# Patient Record
Sex: FEMALE | Race: WHITE | NOT HISPANIC OR LATINO | Employment: PART TIME | URBAN - METROPOLITAN AREA
[De-identification: names, ages, dates, MRNs, and addresses within clinical notes are randomized per-mention and may not be internally consistent; named-entity substitution may affect disease eponyms.]

---

## 2017-02-20 NOTE — TELEPHONE ENCOUNTER
Amy pt requesting refill of phentermine. Saw Dr. Reyes in August for her annual. Allergies and pharmacy are up to date.

## 2017-02-20 NOTE — TELEPHONE ENCOUNTER
[facility] We do not routine prescribe this in our GYN practice

## 2017-04-26 DIAGNOSIS — Z01.419 WELL FEMALE EXAM WITH ROUTINE GYNECOLOGICAL EXAM: ICD-10-CM

## 2017-04-26 RX ORDER — AMITRIPTYLINE HYDROCHLORIDE 25 MG/1
TABLET, FILM COATED ORAL
Qty: 30 TABLET | Refills: 0 | Status: SHIPPED | OUTPATIENT
Start: 2017-04-26 | End: 2017-05-19 | Stop reason: SDUPTHER

## 2017-05-19 DIAGNOSIS — Z01.419 WELL FEMALE EXAM WITH ROUTINE GYNECOLOGICAL EXAM: ICD-10-CM

## 2017-05-22 RX ORDER — AMITRIPTYLINE HYDROCHLORIDE 25 MG/1
TABLET, FILM COATED ORAL
Qty: 30 TABLET | Refills: 0 | Status: SHIPPED | OUTPATIENT
Start: 2017-05-22 | End: 2017-06-19 | Stop reason: SDUPTHER

## 2017-06-19 DIAGNOSIS — Z01.419 WELL FEMALE EXAM WITH ROUTINE GYNECOLOGICAL EXAM: ICD-10-CM

## 2017-06-19 RX ORDER — AMITRIPTYLINE HYDROCHLORIDE 25 MG/1
TABLET, FILM COATED ORAL
Qty: 30 TABLET | Refills: 0 | Status: SHIPPED | OUTPATIENT
Start: 2017-06-19 | End: 2017-07-16 | Stop reason: SDUPTHER

## 2017-07-12 ENCOUNTER — TELEPHONE (OUTPATIENT)
Dept: OBSTETRICS AND GYNECOLOGY | Facility: CLINIC | Age: 62
End: 2017-07-12

## 2017-07-16 DIAGNOSIS — Z01.419 WELL FEMALE EXAM WITH ROUTINE GYNECOLOGICAL EXAM: ICD-10-CM

## 2017-07-17 RX ORDER — AMITRIPTYLINE HYDROCHLORIDE 25 MG/1
TABLET, FILM COATED ORAL
Qty: 30 TABLET | Refills: 0 | Status: SHIPPED | OUTPATIENT
Start: 2017-07-17 | End: 2017-08-14

## 2017-08-03 DIAGNOSIS — Z01.419 WELL FEMALE EXAM WITH ROUTINE GYNECOLOGICAL EXAM: ICD-10-CM

## 2017-08-03 RX ORDER — VENLAFAXINE HYDROCHLORIDE 75 MG/1
CAPSULE, EXTENDED RELEASE ORAL
Qty: 30 CAPSULE | Refills: 2 | Status: SHIPPED | OUTPATIENT
Start: 2017-08-03 | End: 2017-09-29 | Stop reason: SDUPTHER

## 2017-08-04 ENCOUNTER — TELEPHONE (OUTPATIENT)
Dept: OBSTETRICS AND GYNECOLOGY | Facility: CLINIC | Age: 62
End: 2017-08-04

## 2017-08-04 NOTE — TELEPHONE ENCOUNTER
Pt states she hasn't been sleeping for several months.  She has been taking 2 tylenol PM to help but thinks she should increase her dose of sleeping medication.  She is prescribed Elavil 25mg but it takes 3-4 hours before she falls asleep.  She is requesting something to put her to sleep sooner and help her stay asleep.      Allergies and pharmacy UTD

## 2017-08-04 NOTE — TELEPHONE ENCOUNTER
Dr. Reyes-- pt states that her sleep aid Rx is not working for her. She states that she is up for 3 to 4 hours after taking the medication. Pt would like to know if she can have a stronger dose or if her Rx can be changed to something else. Pt's # [phone]

## 2017-08-14 RX ORDER — AMITRIPTYLINE HYDROCHLORIDE 50 MG/1
50 TABLET, FILM COATED ORAL NIGHTLY
Qty: 30 TABLET | Refills: 11 | Status: SHIPPED | OUTPATIENT
Start: 2017-08-14 | End: 2018-02-19 | Stop reason: SDUPTHER

## 2017-08-16 RX ORDER — AMITRIPTYLINE HYDROCHLORIDE 50 MG/1
50 TABLET, FILM COATED ORAL NIGHTLY
Qty: 30 TABLET | Refills: 11 | OUTPATIENT
Start: 2017-08-16 | End: 2018-08-16

## 2017-09-29 ENCOUNTER — OFFICE VISIT (OUTPATIENT)
Dept: OBSTETRICS AND GYNECOLOGY | Facility: CLINIC | Age: 62
End: 2017-09-29
Payer: COMMERCIAL

## 2017-09-29 ENCOUNTER — TELEPHONE (OUTPATIENT)
Dept: OBSTETRICS AND GYNECOLOGY | Facility: CLINIC | Age: 62
End: 2017-09-29

## 2017-09-29 VITALS
SYSTOLIC BLOOD PRESSURE: 128 MMHG | WEIGHT: 148.81 LBS | BODY MASS INDEX: 23.92 KG/M2 | DIASTOLIC BLOOD PRESSURE: 78 MMHG | HEIGHT: 66 IN

## 2017-09-29 DIAGNOSIS — Z12.31 ENCOUNTER FOR SCREENING MAMMOGRAM FOR MALIGNANT NEOPLASM OF BREAST: Primary | ICD-10-CM

## 2017-09-29 DIAGNOSIS — Z01.419 ENCOUNTER FOR GYNECOLOGICAL EXAMINATION (GENERAL) (ROUTINE) WITHOUT ABNORMAL FINDINGS: Primary | ICD-10-CM

## 2017-09-29 PROCEDURE — 99999 PR PBB SHADOW E&M-EST. PATIENT-LVL II: CPT | Mod: PBBFAC,,, | Performed by: OBSTETRICS & GYNECOLOGY

## 2017-09-29 PROCEDURE — 99396 PREV VISIT EST AGE 40-64: CPT | Mod: S$GLB,,, | Performed by: OBSTETRICS & GYNECOLOGY

## 2017-09-29 NOTE — TELEPHONE ENCOUNTER
Let patient know we faxed mmg orders to [facility]. Also, patient has a medication issue that needs to be resolved. Will speak with Dr Reyes on Monday and follow up with patient.

## 2017-09-29 NOTE — PROGRESS NOTES
Subjective:       Patient ID: Nguyen Ivan is a 62 y.o. female.    Chief Complaint:  Annual Exam (last pap/hpv normal/neg 16, colonoscopy .)      There is no problem list on file for this patient.      History of Present Illness  62 y.o. yo  here for annual exam. No gyn complaints. Doing well. Lost 35 pounds with [facility]. Family members lost weight too. Wants to get back on Phentermine. rec do Maintenance though [facility] and they can give Rx for 3 months    Patient had a normal pap smear and HPV in 2016 at last annual visit. I explained new guidelines. Will repeat pap and HPV every 3 years. Answered all questions. Patient agrees.         Past Medical History:   Diagnosis Date    Depression     Family history of breast cancer in sister     age 45,lumpectomy,XRT    Mitral valve prolapse        Past Surgical History:   Procedure Laterality Date    BREAST LUMPECTOMY      atypical ductal hyperplasia     BREAST SURGERY      reduction    CHOLECYSTECTOMY      LIVER LOBECTOMY         OB History    Para Term  AB Living   4 3     1 3   SAB TAB Ectopic Multiple Live Births   1       3      # Outcome Date GA Lbr Bright/2nd Weight Sex Delivery Anes PTL Lv   4 SAB            3 Para      Vag-Spont   REBEKA   2 Para      Vag-Spont   REBEKA   1 Para      Vag-Spont   REBEKA          No LMP recorded. Patient is postmenopausal.   Date of Last Pap: 2016    Review of Systems  Review of Systems   Constitutional: Negative for fatigue and unexpected weight change.   Respiratory: Negative for shortness of breath.    Cardiovascular: Negative for chest pain.   Gastrointestinal: Negative for abdominal pain, constipation, diarrhea, nausea and vomiting.   Genitourinary: Negative for dysuria.   Musculoskeletal: Negative for back pain.   Skin: Negative for rash.   Neurological: Negative for headaches.   Hematological: Does not bruise/bleed easily.   Psychiatric/Behavioral: Negative for behavioral problems.         Objective:   Physical Exam:   Constitutional: She is oriented to person, place, and time. Vital signs are normal. She appears well-developed and well-nourished. No distress.        Pulmonary/Chest: She exhibits no mass. Right breast exhibits no mass, no nipple discharge, no skin change, no tenderness, no bleeding and no swelling. Left breast exhibits no mass, no nipple discharge, no skin change, no tenderness, no bleeding and no swelling. Breasts are symmetrical.        Abdominal: Soft. Normal appearance and bowel sounds are normal. She exhibits no distension and no mass. There is no tenderness. There is no rebound.     Genitourinary: Vagina normal and uterus normal. There is no rash, tenderness, lesion or injury on the right labia. There is no rash, tenderness, lesion or injury on the left labia. Uterus is not deviated, not enlarged, not fixed, not tender, not hosting fibroids and not experiencing uterine prolapse. Cervix is normal. Right adnexum displays no mass, no tenderness and no fullness. Left adnexum displays no mass, no tenderness and no fullness. No erythema, tenderness, rectocele, cystocele or unspecified prolapse of vaginal walls in the vagina. No vaginal discharge found. Cervix exhibits no motion tenderness, no discharge and no friability.           Musculoskeletal: Normal range of motion and moves all extremeties.      Lymphadenopathy:     She has no axillary adenopathy.        Right: No supraclavicular adenopathy present.        Left: No supraclavicular adenopathy present.    Neurological: She is alert and oriented to person, place, and time.    Skin: Skin is warm and dry.    Psychiatric: She has a normal mood and affect. Her behavior is normal. Judgment normal.        Assessment/ Plan:     1. Encounter for gynecological examination (general) (routine) without abnormal findings         Follow-up with me in 1 year

## 2017-10-02 DIAGNOSIS — Z01.419 WELL FEMALE EXAM WITH ROUTINE GYNECOLOGICAL EXAM: ICD-10-CM

## 2017-10-02 RX ORDER — VENLAFAXINE HYDROCHLORIDE 75 MG/1
CAPSULE, EXTENDED RELEASE ORAL
Qty: 90 CAPSULE | Refills: 3 | Status: SHIPPED | OUTPATIENT
Start: 2017-10-02 | End: 2018-03-28 | Stop reason: SDUPTHER

## 2017-10-03 NOTE — TELEPHONE ENCOUNTER
----- Message from Lorie Campos MA sent at 10/2/2017  5:36 PM CDT -----  MD Lorie Slaughter MA         She can do both. Effexor is antidepressant and Elavil can help with sleep at night. Ok to take both if she needs it   Previous Messages        ----- Message -----   From: Lorie Campos MA   Sent: 10/2/2017  11:23 AM   To: Lydia Reyes MD     Patient is confused about whether she is supposed to be taking Effexor or Elavil... She said y'all had talked about switching and she has orders for both but not sure which one to fill. Please advise.     ~ Lorie

## 2017-10-03 NOTE — TELEPHONE ENCOUNTER
Left message for patient confirming that Dr. Reyes said that she can take both meds if she feels like she needs them. Told her to call back with any questions or concerns.

## 2018-01-02 NOTE — TELEPHONE ENCOUNTER
Advised that Dr. Reyes only prescribed Phentermine through [facility] wt loss.  Verbalized understanding.    Abdomen soft, non-tender, no guarding. Obese.

## 2018-02-19 ENCOUNTER — TELEPHONE (OUTPATIENT)
Dept: OBSTETRICS AND GYNECOLOGY | Facility: CLINIC | Age: 63
End: 2018-02-19

## 2018-02-19 RX ORDER — AMITRIPTYLINE HYDROCHLORIDE 50 MG/1
50 TABLET, FILM COATED ORAL NIGHTLY
Qty: 30 TABLET | Refills: 3 | Status: SHIPPED | OUTPATIENT
Start: 2018-02-19 | End: 2019-02-25

## 2018-02-19 NOTE — TELEPHONE ENCOUNTER
Patient notified that RX has been sent to pharm. Also notified per Dr Reyes that if she continues to have sleep disturbances it is best to consult a PCP for evaluation for a possible sleep study. Patient verbalized understanding.

## 2018-02-19 NOTE — TELEPHONE ENCOUNTER
I sent in refill. If not doing enough may need to discuss with PCP her insomnia. They may recommend sleep study or something else. As GYN there are limitation to what meds we give

## 2018-02-19 NOTE — TELEPHONE ENCOUNTER
Dr. Reyes-- pt wanted to notify Dr. Reyes that she is currently taking 50 mg of amitriptyline and states that sometimes it takes 3 hrs to start working. Pt states that she may need a new Rx. Pt's # [phone]

## 2018-03-28 DIAGNOSIS — Z01.419 WELL FEMALE EXAM WITH ROUTINE GYNECOLOGICAL EXAM: ICD-10-CM

## 2018-03-28 RX ORDER — VENLAFAXINE HYDROCHLORIDE 75 MG/1
CAPSULE, EXTENDED RELEASE ORAL
Qty: 90 CAPSULE | Refills: 0 | Status: SHIPPED | OUTPATIENT
Start: 2018-03-28 | End: 2018-05-08 | Stop reason: SDUPTHER

## 2018-05-08 DIAGNOSIS — Z01.419 WELL FEMALE EXAM WITH ROUTINE GYNECOLOGICAL EXAM: ICD-10-CM

## 2018-05-09 RX ORDER — VENLAFAXINE HYDROCHLORIDE 75 MG/1
CAPSULE, EXTENDED RELEASE ORAL
Qty: 90 CAPSULE | Refills: 0 | Status: SHIPPED | OUTPATIENT
Start: 2018-05-09 | End: 2018-07-09 | Stop reason: SDUPTHER

## 2018-07-03 DIAGNOSIS — Z01.419 WELL FEMALE EXAM WITH ROUTINE GYNECOLOGICAL EXAM: ICD-10-CM

## 2018-07-03 RX ORDER — VENLAFAXINE HYDROCHLORIDE 75 MG/1
CAPSULE, EXTENDED RELEASE ORAL
Qty: 90 CAPSULE | Refills: 0 | Status: CANCELLED | OUTPATIENT
Start: 2018-07-03

## 2018-07-09 DIAGNOSIS — Z01.419 WELL FEMALE EXAM WITH ROUTINE GYNECOLOGICAL EXAM: ICD-10-CM

## 2018-07-10 RX ORDER — VENLAFAXINE HYDROCHLORIDE 75 MG/1
CAPSULE, EXTENDED RELEASE ORAL
Qty: 180 CAPSULE | Refills: 0 | Status: SHIPPED | OUTPATIENT
Start: 2018-07-10 | End: 2018-10-10 | Stop reason: SDUPTHER

## 2018-10-10 DIAGNOSIS — Z01.419 WELL FEMALE EXAM WITH ROUTINE GYNECOLOGICAL EXAM: ICD-10-CM

## 2018-10-10 RX ORDER — VENLAFAXINE HYDROCHLORIDE 75 MG/1
CAPSULE, EXTENDED RELEASE ORAL
Qty: 180 CAPSULE | Refills: 0 | Status: SHIPPED | OUTPATIENT
Start: 2018-10-10 | End: 2018-12-27 | Stop reason: SDUPTHER

## 2018-12-27 DIAGNOSIS — Z01.419 WELL FEMALE EXAM WITH ROUTINE GYNECOLOGICAL EXAM: ICD-10-CM

## 2018-12-28 RX ORDER — VENLAFAXINE HYDROCHLORIDE 75 MG/1
CAPSULE, EXTENDED RELEASE ORAL
Qty: 60 CAPSULE | Refills: 0 | Status: SHIPPED | OUTPATIENT
Start: 2018-12-28 | End: 2019-01-25 | Stop reason: SDUPTHER

## 2018-12-28 NOTE — TELEPHONE ENCOUNTER
Left message to inform pt that one month of refills were sent in but that she needs to schedule annual exam for additional refills

## 2019-01-25 DIAGNOSIS — Z01.419 WELL FEMALE EXAM WITH ROUTINE GYNECOLOGICAL EXAM: ICD-10-CM

## 2019-01-25 RX ORDER — VENLAFAXINE HYDROCHLORIDE 75 MG/1
150 CAPSULE, EXTENDED RELEASE ORAL DAILY
Qty: 60 CAPSULE | Refills: 0 | Status: SHIPPED | OUTPATIENT
Start: 2019-01-25 | End: 2019-01-25 | Stop reason: SDUPTHER

## 2019-01-25 RX ORDER — VENLAFAXINE HYDROCHLORIDE 75 MG/1
CAPSULE, EXTENDED RELEASE ORAL
Qty: 180 CAPSULE | Refills: 0 | Status: SHIPPED | OUTPATIENT
Start: 2019-01-25 | End: 2019-04-15 | Stop reason: SDUPTHER

## 2019-02-25 ENCOUNTER — OFFICE VISIT (OUTPATIENT)
Dept: OBSTETRICS AND GYNECOLOGY | Facility: CLINIC | Age: 64
End: 2019-02-25
Payer: MEDICARE

## 2019-02-25 VITALS
HEIGHT: 66 IN | SYSTOLIC BLOOD PRESSURE: 124 MMHG | DIASTOLIC BLOOD PRESSURE: 78 MMHG | BODY MASS INDEX: 25.81 KG/M2 | WEIGHT: 160.63 LBS

## 2019-02-25 DIAGNOSIS — Z01.419 ENCOUNTER FOR GYNECOLOGICAL EXAMINATION (GENERAL) (ROUTINE) WITHOUT ABNORMAL FINDINGS: Primary | ICD-10-CM

## 2019-02-25 PROCEDURE — G0101 CA SCREEN;PELVIC/BREAST EXAM: HCPCS | Mod: S$PBB,,, | Performed by: OBSTETRICS & GYNECOLOGY

## 2019-02-25 PROCEDURE — G0101 CA SCREEN;PELVIC/BREAST EXAM: HCPCS | Mod: PBBFAC,PN | Performed by: OBSTETRICS & GYNECOLOGY

## 2019-02-25 PROCEDURE — 99213 OFFICE O/P EST LOW 20 MIN: CPT | Mod: PBBFAC,PN,25 | Performed by: OBSTETRICS & GYNECOLOGY

## 2019-02-25 PROCEDURE — 99999 PR PBB SHADOW E&M-EST. PATIENT-LVL III: ICD-10-PCS | Mod: PBBFAC,,, | Performed by: OBSTETRICS & GYNECOLOGY

## 2019-02-25 PROCEDURE — 99999 PR PBB SHADOW E&M-EST. PATIENT-LVL III: CPT | Mod: PBBFAC,,, | Performed by: OBSTETRICS & GYNECOLOGY

## 2019-02-25 PROCEDURE — G0101 PR CA SCREEN;PELVIC/BREAST EXAM: ICD-10-PCS | Mod: S$PBB,,, | Performed by: OBSTETRICS & GYNECOLOGY

## 2019-02-25 RX ORDER — LETROZOLE 2.5 MG/1
TABLET, FILM COATED ORAL
Refills: 0 | COMMUNITY
Start: 2019-01-25 | End: 2022-07-27

## 2019-02-25 NOTE — PROGRESS NOTES
Subjective:       Patient ID: Nguyen Ivan is a 63 y.o. female.    Chief Complaint:  Well Woman (Annual Exam  --  last pap/hpv 2016, Negative  --  last mmg , Birads 4 (right breast)  --  colonoscopy )      There is no problem list on file for this patient.      History of Present Illness  63 y.o. yo  here for annual exam. No gyn complaints. Doing well. Had double mastectomy for breast cancer. Reconstruction with Dr. Callejas. Doing well. No longer needs MMG. On Femara.     Patient had a normal pap smear and HPV in 2016 at last annual visit. I explained new guidelines. Will repeat pap and HPV every 3 years. Answered all questions. Patient agrees.     Past Medical History:   Diagnosis Date    Breast cancer 2017    Birads 4  Bilateral Mastectomy     Depression     Family history of breast cancer in sister     age 45,lumpectomy,XRT    H/O abnormal mammogram 10/30/2017    Birads 4    Mitral valve prolapse        Past Surgical History:   Procedure Laterality Date    BILATERAL MASTECTOMY Bilateral 2017    BREAST LUMPECTOMY      atypical ductal hyperplasia     BREAST RECONSTRUCTION Bilateral 3/2018 & 2018    CHOLECYSTECTOMY  2005    COLONOSCOPY      DILATION AND CURETTAGE OF UTERUS      Missed Ab    LIVER LOBECTOMY  2005    REDUCTION OF BOTH BREASTS  2009    TONSILLECTOMY  1970    TUBAL LIGATION      with last delivery     UMBILICAL HERNIA REPAIR  2006       OB History    Para Term  AB Living   4 3 3   1 3   SAB TAB Ectopic Multiple Live Births   1       3      # Outcome Date GA Lbr Bright/2nd Weight Sex Delivery Anes PTL Lv   4 Term  40w0d   F Vag-Spont EPI  REBEKA      Birth Comments: BTL Done    3 1983 8w0d          2 Term  40w0d   M Vag-Spont EPI  REBEKA   1 Term  40w0d   F Vag-Spont EPI  REBEKA          No LMP recorded (lmp unknown). Patient is postmenopausal.   Date of Last Pap: 2016    Review of Systems  Review of Systems    Constitutional: Negative for fatigue and unexpected weight change.   Respiratory: Negative for shortness of breath.    Cardiovascular: Negative for chest pain.   Gastrointestinal: Negative for abdominal pain, constipation, diarrhea, nausea and vomiting.   Genitourinary: Negative for dysuria.   Musculoskeletal: Negative for back pain.   Skin: Negative for rash.   Neurological: Negative for headaches.   Hematological: Does not bruise/bleed easily.   Psychiatric/Behavioral: Negative for behavioral problems.        Objective:   Physical Exam:   Constitutional: She is oriented to person, place, and time. Vital signs are normal. She appears well-developed and well-nourished. No distress.        Pulmonary/Chest: She exhibits no mass. Right breast exhibits no mass, no nipple discharge, no skin change, no tenderness, no bleeding and no swelling. Left breast exhibits no mass, no nipple discharge, no skin change, no tenderness, no bleeding and no swelling. Breasts are symmetrical.   Reconstruction surgery        Abdominal: Soft. Normal appearance and bowel sounds are normal. She exhibits no distension and no mass. There is no tenderness. There is no rebound.     Genitourinary: Vagina normal and uterus normal. There is no rash, tenderness, lesion or injury on the right labia. There is no rash, tenderness, lesion or injury on the left labia. Uterus is not deviated, not enlarged, not fixed, not tender, not hosting fibroids and not experiencing uterine prolapse. Cervix is normal. Right adnexum displays no mass, no tenderness and no fullness. Left adnexum displays no mass, no tenderness and no fullness. No erythema, tenderness, rectocele, cystocele or unspecified prolapse of vaginal walls in the vagina. No vaginal discharge found. Cervix exhibits no motion tenderness, no discharge and no friability.           Musculoskeletal: Normal range of motion and moves all extremeties.      Lymphadenopathy:     She has no axillary  adenopathy.        Right: No supraclavicular adenopathy present.        Left: No supraclavicular adenopathy present.    Neurological: She is alert and oriented to person, place, and time.    Skin: Skin is warm and dry.    Psychiatric: She has a normal mood and affect. Her behavior is normal. Judgment normal.        Assessment/ Plan:     1. Encounter for gynecological examination (general) (routine) without abnormal findings         Follow-up with me in 1 year

## 2019-04-15 DIAGNOSIS — Z01.419 WELL FEMALE EXAM WITH ROUTINE GYNECOLOGICAL EXAM: ICD-10-CM

## 2019-04-15 RX ORDER — VENLAFAXINE HYDROCHLORIDE 75 MG/1
CAPSULE, EXTENDED RELEASE ORAL
Qty: 180 CAPSULE | Refills: 0 | Status: SHIPPED | OUTPATIENT
Start: 2019-04-15 | End: 2020-11-12 | Stop reason: SDUPTHER

## 2019-04-30 DIAGNOSIS — Z01.419 WELL FEMALE EXAM WITH ROUTINE GYNECOLOGICAL EXAM: ICD-10-CM

## 2019-04-30 RX ORDER — VENLAFAXINE HYDROCHLORIDE 75 MG/1
150 CAPSULE, EXTENDED RELEASE ORAL DAILY
Qty: 180 CAPSULE | Refills: 0 | OUTPATIENT
Start: 2019-04-30

## 2019-05-09 ENCOUNTER — CLINICAL SUPPORT (OUTPATIENT)
Dept: INTERNAL MEDICINE | Facility: CLINIC | Age: 64
End: 2019-05-09
Payer: MEDICARE

## 2019-05-09 ENCOUNTER — IMMUNIZATION (OUTPATIENT)
Dept: PHARMACY | Facility: CLINIC | Age: 64
End: 2019-05-09
Payer: MEDICARE

## 2019-05-09 ENCOUNTER — OFFICE VISIT (OUTPATIENT)
Dept: INTERNAL MEDICINE | Facility: CLINIC | Age: 64
End: 2019-05-09
Payer: MEDICARE

## 2019-05-09 VITALS
HEART RATE: 78 BPM | OXYGEN SATURATION: 98 % | HEIGHT: 66 IN | BODY MASS INDEX: 25.79 KG/M2 | DIASTOLIC BLOOD PRESSURE: 76 MMHG | WEIGHT: 160.5 LBS | SYSTOLIC BLOOD PRESSURE: 112 MMHG

## 2019-05-09 DIAGNOSIS — Z13.220 LIPID SCREENING: ICD-10-CM

## 2019-05-09 DIAGNOSIS — Z90.13 HISTORY OF BILATERAL MASTECTOMY: ICD-10-CM

## 2019-05-09 DIAGNOSIS — L98.9 HAND LESION: ICD-10-CM

## 2019-05-09 DIAGNOSIS — Z13.0 SCREENING, ANEMIA, DEFICIENCY, IRON: ICD-10-CM

## 2019-05-09 DIAGNOSIS — F32.9 MAJOR DEPRESSION, CHRONIC: ICD-10-CM

## 2019-05-09 DIAGNOSIS — Z11.59 NEED FOR HEPATITIS C SCREENING TEST: ICD-10-CM

## 2019-05-09 DIAGNOSIS — Z79.899 OTHER LONG TERM (CURRENT) DRUG THERAPY: ICD-10-CM

## 2019-05-09 DIAGNOSIS — Z76.89 ESTABLISHING CARE WITH NEW DOCTOR, ENCOUNTER FOR: Primary | ICD-10-CM

## 2019-05-09 DIAGNOSIS — R68.89 FORGETFULNESS: ICD-10-CM

## 2019-05-09 DIAGNOSIS — Z78.0 POST-MENOPAUSAL: ICD-10-CM

## 2019-05-09 DIAGNOSIS — Z85.3 HISTORY OF BREAST CANCER: ICD-10-CM

## 2019-05-09 DIAGNOSIS — Z13.6 ENCOUNTER FOR SCREENING FOR CARDIOVASCULAR DISORDERS: ICD-10-CM

## 2019-05-09 DIAGNOSIS — Z13.1 SCREENING FOR DIABETES MELLITUS: ICD-10-CM

## 2019-05-09 PROCEDURE — 99203 OFFICE O/P NEW LOW 30 MIN: CPT | Mod: S$PBB,,, | Performed by: INTERNAL MEDICINE

## 2019-05-09 PROCEDURE — 99999 PR PBB SHADOW E&M-EST. PATIENT-LVL III: ICD-10-PCS | Mod: PBBFAC,,, | Performed by: INTERNAL MEDICINE

## 2019-05-09 PROCEDURE — 99213 OFFICE O/P EST LOW 20 MIN: CPT | Mod: PBBFAC,27 | Performed by: INTERNAL MEDICINE

## 2019-05-09 PROCEDURE — 99203 PR OFFICE/OUTPT VISIT, NEW, LEVL III, 30-44 MIN: ICD-10-PCS | Mod: S$PBB,,, | Performed by: INTERNAL MEDICINE

## 2019-05-09 PROCEDURE — 99999 PR PBB SHADOW E&M-EST. PATIENT-LVL I: ICD-10-PCS | Mod: PBBFAC,,,

## 2019-05-09 PROCEDURE — 99999 PR PBB SHADOW E&M-EST. PATIENT-LVL III: CPT | Mod: PBBFAC,,, | Performed by: INTERNAL MEDICINE

## 2019-05-09 PROCEDURE — 99211 OFF/OP EST MAY X REQ PHY/QHP: CPT | Mod: PBBFAC,25

## 2019-05-09 PROCEDURE — G0009 ADMIN PNEUMOCOCCAL VACCINE: HCPCS | Mod: PBBFAC

## 2019-05-09 PROCEDURE — 99999 PR PBB SHADOW E&M-EST. PATIENT-LVL I: CPT | Mod: PBBFAC,,,

## 2019-05-09 NOTE — PROGRESS NOTES
"INTERNAL MEDICINE INITIAL VISIT NOTE      CHIEF COMPLAINT     Chief Complaint   Patient presents with    Establish Care       HPI     Nguyen Ivan is a 64 y.o.  female who presents with Breast CA s/p B mastectomy 11/2017 and s/p breast reconstruction, currently on Letrozole, fam hx breast CA, MVP (seen by Cards in the past at  and told it was no longer an issue, last echo many years ago, depression, hx liver lobectomy 2/2 "a growth on her liver" (path benign per pt and reports f/u imaging had been normal), baseline hearing impaired, chronic neck and back pain c hx herniated discs followed by Dr. Fairchild, former pt of Dr. Allison at , here today to establish care.    Sees Dr. Lydia Reyes for gyn and now wants to have all care within [facility].    Regarding neck and back issues, has been on Gabapentin as rx'ed by pain management, Dr. Fairchild but more recently feels like she has gotten more forgetful at times (example, showed up for a doctor's appt on the wrong day) so has been trying to cut back on her dose, currently takes one in AM and 2qhs.    Regarding depression, has been on Venlafaxine which she feels has really helped.    Today also c c/o lump on the palm of her hand present for quite some time, tender at times.  Has appt pending c Hand Surg/Dr. Galindo for evaluation.    Past Medical History:  Past Medical History:   Diagnosis Date    Breast cancer 12/2017    Birads 4  Bilateral Mastectomy     Depression     Family history of breast cancer in sister     age 45,lumpectomy,XRT    H/O abnormal mammogram 10/30/2017    Birads 4    Mitral valve prolapse        Past Surgical History:  Past Surgical History:   Procedure Laterality Date    BILATERAL MASTECTOMY Bilateral 12/2017    BREAST LUMPECTOMY  2017    atypical ductal hyperplasia     BREAST RECONSTRUCTION Bilateral 3/2018 & 5/2018    CHOLECYSTECTOMY  2005    COLONOSCOPY  2011    DILATION AND CURETTAGE OF UTERUS  1983    Missed Ab "    LIVER LOBECTOMY  2005    REDUCTION OF BOTH BREASTS  2009    TONSILLECTOMY  08/1970    TUBAL LIGATION  1985    with last delivery     UMBILICAL HERNIA REPAIR  2006       Home Medications:  Prior to Admission medications    Medication Sig Start Date End Date Taking? Authorizing Provider   gabapentin (NEURONTIN) 400 MG capsule TK ONE C PO  IN THE MORNING AND 3 CS PO HS 8/15/16   Historical Provider, MD   letrozole (FEMARA) 2.5 mg Tab TK 1 T PO D 1/25/19   Historical Provider, MD   MELATON-GENISTEIN-HERB NO.233 ORAL Take 1 tablet by mouth nightly as needed.    Historical Provider, MD   venlafaxine (EFFEXOR-XR) 75 MG 24 hr capsule TAKE 2 CAPSULES BY MOUTH EVERY DAY 4/15/19   Donna Hernandez MD   diphenhydrAMINE HCl (UNISOM, DIPHENHYDRAMINE,) 50 mg/30 mL Liqd Take by mouth nightly as needed.  5/9/19  Historical Provider, MD   diphenhydramine-acetaminophen (TYLENOL PM EXTRA STRENGTH)  mg Tab Take 1 tablet by mouth nightly as needed.  5/9/19  Historical Provider, MD       Allergies:  Review of patient's allergies indicates:  No Known Allergies    Family History:  Family History   Problem Relation Age of Onset    Breast cancer Sister 45        XRT    Heart attack Father     Dementia Mother     Alzheimer's disease Mother     Breast cancer Paternal Aunt     Breast cancer Maternal Aunt     Colon cancer Neg Hx     Ovarian cancer Neg Hx     Uterine cancer Neg Hx     Cervical cancer Neg Hx     Prostate cancer Neg Hx        Social History:  Social History     Tobacco Use    Smoking status: Never Smoker    Smokeless tobacco: Never Used   Substance Use Topics    Alcohol use: Yes     Comment: Social     Drug use: No       Review of Systems:  Review of Systems   Constitutional: Negative for chills, fatigue and fever.   Respiratory: Negative for cough, chest tightness and shortness of breath.    Cardiovascular: Negative for chest pain.   Gastrointestinal: Negative for abdominal pain and blood in  "stool.   Genitourinary: Negative for dysuria and frequency.       Health Maintenance:   Immunizations:   Influenza does not take, rec this Fall  TDap today  Prevnar 13 today  Shingrix rec once back in stock    Cancer Screening:  PAP: 8/2016 neg c neg HPV  Mammogram:  N/a, hx B mastectomy  Colonoscopy:  Done around 8-10 years ago at , records requested to determine f/u date.  Was told normal.  DEXA:  Recommended, last done about 10 years ago per hx, will order.      PHYSICAL EXAM     /76 (BP Location: Left arm, Patient Position: Sitting, BP Method: Medium (Manual))   Pulse 78   Ht 5' 6" (1.676 m)   Wt 72.8 kg (160 lb 7.9 oz)   LMP  (LMP Unknown) Comment:   2003  SpO2 98%   BMI 25.90 kg/m²     GEN - A+OX4, NAD   HEENT - PERRL, EOMI, OP clear  Neck - No thyromegaly or cervical LAD. No thyroid masses felt.  CV - RRR, no m/r/g  Chest - CTAB, no wheezing, crackles, or rhonchi  Abd - S/NT/ND/+BS.   Ext - 2+BDP. No C/C/E. Bony growth palpated on palm of R hand, just medial to 3rd finger, some firmness of tendon just adjacent.  LN - No LAD appreciated.  Skin - Normal color and texture, no rash, no skin lesions.      LABS         ASSESSMENT/PLAN     Nguyen Ivan is a 64 y.o. female with  Nguyen was seen today for establish care.    Diagnoses and all orders for this visit:    Establishing care with new doctor, encounter for  History and physical exam completed.  Health maintenance reviewed as above.    History of breast cancer  History of bilateral mastectomy  As per HPI  Followed by Dr. Betty Meyer at   On Letrozole, year 1 of 5 per pt.  No longer req mmg.    Major depression, chronic  Stable on Effexor, ok to cont , no refills needed at this time.  -     Vitamin B12; Future; Expected date: 05/09/2019    Screening, anemia, deficiency, iron  -     CBC auto differential; Future; Expected date: 05/09/2019    Forgetfulness  Likely benign, sx very mild.  Can try to taper down on gabapentin to see if this " helps.  Will check TSH and b12.  Consider neuro eval in future if sx progress, fam hx early Alzheimers.  -     TSH; Future; Expected date: 05/09/2019  -     Vitamin B12; Future; Expected date: 05/09/2019    Post-menopausal  -     DXA Bone Density Spine And Hip; Future; Expected date: 05/09/2019    Screening for diabetes mellitus  -     Comprehensive metabolic panel; Future; Expected date: 05/09/2019    Lipid screening  -     Lipid panel; Future; Expected date: 05/09/2019    Encounter for screening for cardiovascular disorders   -     Lipid panel; Future; Expected date: 05/09/2019    Hand lesion  Has appt pending c Dr. Galindo which she was advised to keep.    HM as above    RTC in 12 months, sooner if needed and depending on labs.    Teresa Biswas MD  Department of Internal Medicine - [facility]  05/09/2019

## 2019-05-14 ENCOUNTER — LAB VISIT (OUTPATIENT)
Dept: LAB | Facility: HOSPITAL | Age: 64
End: 2019-05-14
Attending: INTERNAL MEDICINE
Payer: MEDICARE

## 2019-05-14 DIAGNOSIS — Z13.0 SCREENING, ANEMIA, DEFICIENCY, IRON: ICD-10-CM

## 2019-05-14 DIAGNOSIS — Z13.6 ENCOUNTER FOR SCREENING FOR CARDIOVASCULAR DISORDERS: ICD-10-CM

## 2019-05-14 DIAGNOSIS — F32.9 MAJOR DEPRESSION, CHRONIC: ICD-10-CM

## 2019-05-14 DIAGNOSIS — R68.89 FORGETFULNESS: ICD-10-CM

## 2019-05-14 DIAGNOSIS — Z13.1 SCREENING FOR DIABETES MELLITUS: ICD-10-CM

## 2019-05-14 DIAGNOSIS — Z13.220 LIPID SCREENING: ICD-10-CM

## 2019-05-14 DIAGNOSIS — Z79.899 OTHER LONG TERM (CURRENT) DRUG THERAPY: ICD-10-CM

## 2019-05-14 LAB
ALBUMIN SERPL BCP-MCNC: 4 G/DL (ref 3.5–5.2)
ALP SERPL-CCNC: 63 U/L (ref 55–135)
ALT SERPL W/O P-5'-P-CCNC: 27 U/L (ref 10–44)
ANION GAP SERPL CALC-SCNC: 9 MMOL/L (ref 8–16)
AST SERPL-CCNC: 19 U/L (ref 10–40)
BASOPHILS # BLD AUTO: 0.06 K/UL (ref 0–0.2)
BASOPHILS NFR BLD: 1.1 % (ref 0–1.9)
BILIRUB SERPL-MCNC: 0.6 MG/DL (ref 0.1–1)
BUN SERPL-MCNC: 16 MG/DL (ref 8–23)
CALCIUM SERPL-MCNC: 9.7 MG/DL (ref 8.7–10.5)
CHLORIDE SERPL-SCNC: 106 MMOL/L (ref 95–110)
CHOLEST SERPL-MCNC: 200 MG/DL (ref 120–199)
CHOLEST/HDLC SERPL: 3.6 {RATIO} (ref 2–5)
CO2 SERPL-SCNC: 27 MMOL/L (ref 23–29)
CREAT SERPL-MCNC: 0.7 MG/DL (ref 0.5–1.4)
DIFFERENTIAL METHOD: ABNORMAL
EOSINOPHIL # BLD AUTO: 0.2 K/UL (ref 0–0.5)
EOSINOPHIL NFR BLD: 3.5 % (ref 0–8)
ERYTHROCYTE [DISTWIDTH] IN BLOOD BY AUTOMATED COUNT: 13.5 % (ref 11.5–14.5)
EST. GFR  (AFRICAN AMERICAN): >60 ML/MIN/1.73 M^2
EST. GFR  (NON AFRICAN AMERICAN): >60 ML/MIN/1.73 M^2
GLUCOSE SERPL-MCNC: 104 MG/DL (ref 70–110)
HCT VFR BLD AUTO: 43.3 % (ref 37–48.5)
HDLC SERPL-MCNC: 55 MG/DL (ref 40–75)
HDLC SERPL: 27.5 % (ref 20–50)
HGB BLD-MCNC: 13.7 G/DL (ref 12–16)
IMM GRANULOCYTES # BLD AUTO: 0.01 K/UL (ref 0–0.04)
IMM GRANULOCYTES NFR BLD AUTO: 0.2 % (ref 0–0.5)
LDLC SERPL CALC-MCNC: 120 MG/DL (ref 63–159)
LYMPHOCYTES # BLD AUTO: 2.8 K/UL (ref 1–4.8)
LYMPHOCYTES NFR BLD: 51.4 % (ref 18–48)
MCH RBC QN AUTO: 28.8 PG (ref 27–31)
MCHC RBC AUTO-ENTMCNC: 31.6 G/DL (ref 32–36)
MCV RBC AUTO: 91 FL (ref 82–98)
MONOCYTES # BLD AUTO: 0.5 K/UL (ref 0.3–1)
MONOCYTES NFR BLD: 8.3 % (ref 4–15)
NEUTROPHILS # BLD AUTO: 1.9 K/UL (ref 1.8–7.7)
NEUTROPHILS NFR BLD: 35.5 % (ref 38–73)
NONHDLC SERPL-MCNC: 145 MG/DL
NRBC BLD-RTO: 0 /100 WBC
PLATELET # BLD AUTO: 325 K/UL (ref 150–350)
PMV BLD AUTO: 9.5 FL (ref 9.2–12.9)
POTASSIUM SERPL-SCNC: 4.4 MMOL/L (ref 3.5–5.1)
PROT SERPL-MCNC: 7.1 G/DL (ref 6–8.4)
RBC # BLD AUTO: 4.76 M/UL (ref 4–5.4)
SODIUM SERPL-SCNC: 142 MMOL/L (ref 136–145)
TRIGL SERPL-MCNC: 125 MG/DL (ref 30–150)
TSH SERPL DL<=0.005 MIU/L-ACNC: 0.87 UIU/ML (ref 0.4–4)
VIT B12 SERPL-MCNC: 574 PG/ML (ref 210–950)
WBC # BLD AUTO: 5.39 K/UL (ref 3.9–12.7)

## 2019-05-14 PROCEDURE — 82607 VITAMIN B-12: CPT

## 2019-05-14 PROCEDURE — 80061 LIPID PANEL: CPT

## 2019-05-14 PROCEDURE — 84443 ASSAY THYROID STIM HORMONE: CPT

## 2019-05-14 PROCEDURE — 80053 COMPREHEN METABOLIC PANEL: CPT

## 2019-05-14 PROCEDURE — 85025 COMPLETE CBC W/AUTO DIFF WBC: CPT

## 2019-05-14 PROCEDURE — 36415 COLL VENOUS BLD VENIPUNCTURE: CPT | Mod: PO

## 2019-05-21 ENCOUNTER — APPOINTMENT (OUTPATIENT)
Dept: RADIOLOGY | Facility: CLINIC | Age: 64
End: 2019-05-21
Attending: INTERNAL MEDICINE
Payer: MEDICARE

## 2019-05-21 DIAGNOSIS — Z78.0 POST-MENOPAUSAL: ICD-10-CM

## 2019-05-21 PROCEDURE — 77080 DXA BONE DENSITY AXIAL: CPT | Mod: TC,PO

## 2019-05-21 PROCEDURE — 77080 DXA BONE DENSITY AXIAL: CPT | Mod: 26,,, | Performed by: INTERNAL MEDICINE

## 2019-05-21 PROCEDURE — 77080 DEXA BONE DENSITY SPINE HIP: ICD-10-PCS | Mod: 26,,, | Performed by: INTERNAL MEDICINE

## 2019-06-18 ENCOUNTER — TELEPHONE (OUTPATIENT)
Dept: INTERNAL MEDICINE | Facility: CLINIC | Age: 64
End: 2019-06-18

## 2019-06-18 NOTE — TELEPHONE ENCOUNTER
----- Message from Oma Schuster sent at 6/18/2019  3:08 PM CDT -----  Contact: self/[phone]  Pt called in regard to letting the office know that she have received the messages from the office and is taking the med's that the dr suggested and thanks for the calls.       Please advise

## 2019-07-29 RX ORDER — AMITRIPTYLINE HYDROCHLORIDE 50 MG/1
TABLET, FILM COATED ORAL
Qty: 30 TABLET | Refills: 0 | Status: SHIPPED | OUTPATIENT
Start: 2019-07-29 | End: 2020-11-12

## 2019-07-31 DIAGNOSIS — Z01.419 WELL FEMALE EXAM WITH ROUTINE GYNECOLOGICAL EXAM: ICD-10-CM

## 2019-07-31 RX ORDER — VENLAFAXINE HYDROCHLORIDE 75 MG/1
CAPSULE, EXTENDED RELEASE ORAL
Qty: 180 CAPSULE | Refills: 0 | Status: SHIPPED | OUTPATIENT
Start: 2019-07-31 | End: 2019-10-21 | Stop reason: SDUPTHER

## 2019-10-21 DIAGNOSIS — Z01.419 WELL FEMALE EXAM WITH ROUTINE GYNECOLOGICAL EXAM: ICD-10-CM

## 2019-10-21 RX ORDER — VENLAFAXINE HYDROCHLORIDE 75 MG/1
CAPSULE, EXTENDED RELEASE ORAL
Qty: 180 CAPSULE | Refills: 0 | Status: SHIPPED | OUTPATIENT
Start: 2019-10-21 | End: 2020-01-20

## 2020-01-19 DIAGNOSIS — Z01.419 WELL FEMALE EXAM WITH ROUTINE GYNECOLOGICAL EXAM: ICD-10-CM

## 2020-01-20 RX ORDER — VENLAFAXINE HYDROCHLORIDE 75 MG/1
CAPSULE, EXTENDED RELEASE ORAL
Qty: 180 CAPSULE | Refills: 0 | Status: SHIPPED | OUTPATIENT
Start: 2020-01-20 | End: 2020-04-13

## 2020-04-11 DIAGNOSIS — Z01.419 WELL FEMALE EXAM WITH ROUTINE GYNECOLOGICAL EXAM: ICD-10-CM

## 2020-04-13 RX ORDER — VENLAFAXINE HYDROCHLORIDE 75 MG/1
CAPSULE, EXTENDED RELEASE ORAL
Qty: 180 CAPSULE | Refills: 0 | Status: SHIPPED | OUTPATIENT
Start: 2020-04-13 | End: 2020-07-13

## 2020-07-17 DIAGNOSIS — Z71.89 COMPLEX CARE COORDINATION: ICD-10-CM

## 2020-08-05 ENCOUNTER — PES CALL (OUTPATIENT)
Dept: ADMINISTRATIVE | Facility: CLINIC | Age: 65
End: 2020-08-05

## 2020-08-20 ENCOUNTER — PES CALL (OUTPATIENT)
Dept: ADMINISTRATIVE | Facility: CLINIC | Age: 65
End: 2020-08-20

## 2020-09-26 ENCOUNTER — NURSE TRIAGE (OUTPATIENT)
Dept: ADMINISTRATIVE | Facility: CLINIC | Age: 65
End: 2020-09-26

## 2020-09-26 NOTE — TELEPHONE ENCOUNTER
Reason for Disposition   [1] COVID-19 EXPOSURE (Close Contact) AND [2] within last 14 days BUT [3] NO symptoms    Additional Information   Negative: COVID-19 has been diagnosed by a healthcare provider (HCP)   Negative: COVID-19 lab test positive   Negative: [1] Symptoms of COVID-19 (e.g., cough, fever, SOB, or others) AND [2] lives in an area with community spread   Negative: [1] Symptoms of COVID-19 (e.g., cough, fever, SOB, or others) AND [2] within 14 days of EXPOSURE (close contact) with diagnosed or suspected COVID-19 patient   Negative: [1] Symptoms of COVID-19 (e.g., cough, fever, SOB, or others) AND [2] within 14 days of travel from high-risk area for COVID-19 community spread (identified by CDC)   Negative: [1] Difficulty breathing (shortness of breath) occurs AND [2] onset > 14 days after COVID-19 EXPOSURE (Close Contact) AND [3] no community spread where patient lives   Negative: [1] Dry cough occurs AND [2] onset > 14 days after COVID-19 EXPOSURE AND [3] no community spread where patient lives   Negative: [1] Wet cough (i.e., white-yellow, yellow, green, or dante colored sputum) AND [2] onset > 14 days after COVID-19 EXPOSURE AND [3] no community spread where patient lives   Negative: [1] Common cold symptoms AND [2] onset > 14 days after COVID-19 EXPOSURE AND [3] no community spread where patient lives   Negative: [1] COVID-19 EXPOSURE (Close Contact) within last 14 days AND [2] needs COVID-19 lab test to return to work AND [3] NO symptoms   Negative: [1] COVID-19 EXPOSURE (Close Contact) within last 14 days AND [2] exposed person is a healthcare worker who was NOT using all recommended personal protective equipment (i.e., a respirator-N95 mask, eye protection, gloves, and gown) AND [3] NO symptoms    Protocols used: CORONAVIRUS (COVID-19) EXPOSURE-A-    Pt stated she was in close contact with a COVID positive person on Wed, less than 6 feet apart for an hour. Asked if she should  test. Denies symptoms. Advised to test at least 5 days after exposure since many COVID positive patients never develop symptoms. Advised a message will be sent to PCP to follow up on Monday and to request a lab order  for Pt to test. Pt also advised of [facility] test locations, and [facility] website for test locations. Pt would like follow up from PCP. Advised to call [facility] back if symptoms develop. Advised a message will be sent to PCP's office.

## 2020-09-26 NOTE — TELEPHONE ENCOUNTER
"    Reason for Disposition   Health Information question, no triage required and triager able to answer question    Additional Information   Negative: [1] Caller is not with the adult (patient) AND [2] reporting urgent symptoms   Negative: Lab result questions   Negative: Medication questions   Negative: Caller can't be reached by phone   Negative: Caller has already spoken to PCP or another triager   Negative: RN needs further essential information from caller in order to complete triage   Negative: Requesting regular office appointment   Negative: [1] Caller requesting NON-URGENT health information AND [2] PCP's office is the best resource    Protocols used: INFORMATION ONLY CALL - NO TRIAGE-A-    Pt called with follow up questions regarding our previous encounter. Answered COVID questions per "COVID-19 Questions-On Call Nurses" script. Pt verbalized understanding.  "

## 2020-09-28 ENCOUNTER — LAB VISIT (OUTPATIENT)
Dept: PRIMARY CARE CLINIC | Facility: CLINIC | Age: 65
End: 2020-09-28

## 2020-09-28 DIAGNOSIS — R05.9 COUGH: ICD-10-CM

## 2020-09-28 NOTE — TELEPHONE ENCOUNTER
Spoke with pt about covid, pt will go to a community site for 9, pt have no symptoms but wants to get tested

## 2020-09-30 LAB — SARS-COV-2 RNA RESP QL NAA+PROBE: NEGATIVE

## 2020-10-05 ENCOUNTER — PATIENT MESSAGE (OUTPATIENT)
Dept: ADMINISTRATIVE | Facility: HOSPITAL | Age: 65
End: 2020-10-05

## 2020-10-06 ENCOUNTER — TELEPHONE (OUTPATIENT)
Dept: INTERNAL MEDICINE | Facility: CLINIC | Age: 65
End: 2020-10-06

## 2020-10-06 NOTE — TELEPHONE ENCOUNTER
----- Message from Main Garcia sent at 10/6/2020  7:48 AM CDT -----  Regarding: Portal request  Appointment Request From: Nguyen Ivan    With Provider: Teresa Biswas MD [[facility]]    Preferred Date Range: 10/15/2020 - 10/15/2020    Preferred Times: Thursday Afternoon    Reason for visit: Past due for an annual visit & possible flu shot    Comments:  To get to know my doctor better, regular checkup and discussion of flu shot & COVID vaccine    Please reply directly to the patient.

## 2020-11-12 ENCOUNTER — OFFICE VISIT (OUTPATIENT)
Dept: INTERNAL MEDICINE | Facility: CLINIC | Age: 65
End: 2020-11-12
Payer: MEDICARE

## 2020-11-12 ENCOUNTER — CLINICAL SUPPORT (OUTPATIENT)
Dept: INTERNAL MEDICINE | Facility: CLINIC | Age: 65
End: 2020-11-12
Payer: COMMERCIAL

## 2020-11-12 ENCOUNTER — IMMUNIZATION (OUTPATIENT)
Dept: INTERNAL MEDICINE | Facility: CLINIC | Age: 65
End: 2020-11-12
Payer: MEDICARE

## 2020-11-12 ENCOUNTER — TELEPHONE (OUTPATIENT)
Dept: INTERNAL MEDICINE | Facility: CLINIC | Age: 65
End: 2020-11-12

## 2020-11-12 VITALS
WEIGHT: 169.06 LBS | SYSTOLIC BLOOD PRESSURE: 122 MMHG | HEART RATE: 84 BPM | OXYGEN SATURATION: 98 % | BODY MASS INDEX: 27.17 KG/M2 | HEIGHT: 66 IN | DIASTOLIC BLOOD PRESSURE: 84 MMHG

## 2020-11-12 DIAGNOSIS — Z85.3 HISTORY OF BREAST CANCER: ICD-10-CM

## 2020-11-12 DIAGNOSIS — Z90.13 HISTORY OF BILATERAL MASTECTOMY: ICD-10-CM

## 2020-11-12 DIAGNOSIS — F41.9 ANXIETY: ICD-10-CM

## 2020-11-12 DIAGNOSIS — M85.80 OSTEOPENIA, UNSPECIFIED LOCATION: ICD-10-CM

## 2020-11-12 DIAGNOSIS — R91.1 LUNG NODULE: ICD-10-CM

## 2020-11-12 DIAGNOSIS — Z00.00 VISIT FOR ANNUAL HEALTH EXAMINATION: Primary | ICD-10-CM

## 2020-11-12 DIAGNOSIS — Z12.11 SCREEN FOR COLON CANCER: ICD-10-CM

## 2020-11-12 DIAGNOSIS — Z13.220 LIPID SCREENING: ICD-10-CM

## 2020-11-12 DIAGNOSIS — Z13.0 SCREENING, ANEMIA, DEFICIENCY, IRON: ICD-10-CM

## 2020-11-12 DIAGNOSIS — F32.9 MAJOR DEPRESSION, CHRONIC: ICD-10-CM

## 2020-11-12 DIAGNOSIS — M79.621 AXILLARY TENDERNESS, RIGHT: ICD-10-CM

## 2020-11-12 DIAGNOSIS — Z13.1 SCREENING FOR DIABETES MELLITUS: ICD-10-CM

## 2020-11-12 PROCEDURE — 99215 OFFICE O/P EST HI 40 MIN: CPT | Mod: PBBFAC,25 | Performed by: INTERNAL MEDICINE

## 2020-11-12 PROCEDURE — 99397 PR PREVENTIVE VISIT,EST,65 & OVER: ICD-10-PCS | Mod: S$PBB,,, | Performed by: INTERNAL MEDICINE

## 2020-11-12 PROCEDURE — 90694 VACC AIIV4 NO PRSRV 0.5ML IM: CPT | Mod: PBBFAC

## 2020-11-12 PROCEDURE — 99999 PR PBB SHADOW E&M-EST. PATIENT-LVL V: ICD-10-PCS | Mod: PBBFAC,,, | Performed by: INTERNAL MEDICINE

## 2020-11-12 PROCEDURE — G0008 ADMIN INFLUENZA VIRUS VAC: HCPCS | Mod: PBBFAC

## 2020-11-12 PROCEDURE — 99999 PR PBB SHADOW E&M-EST. PATIENT-LVL V: CPT | Mod: PBBFAC,,, | Performed by: INTERNAL MEDICINE

## 2020-11-12 PROCEDURE — 99397 PER PM REEVAL EST PAT 65+ YR: CPT | Mod: S$PBB,,, | Performed by: INTERNAL MEDICINE

## 2020-11-12 RX ORDER — ALPRAZOLAM 0.25 MG/1
0.25 TABLET ORAL NIGHTLY PRN
Qty: 30 TABLET | Refills: 0 | Status: SHIPPED | OUTPATIENT
Start: 2020-11-12 | End: 2021-08-02 | Stop reason: SDUPTHER

## 2020-11-12 NOTE — PATIENT INSTRUCTIONS
Stop aleve PM.  Start Benadryl as needed for sleep (25 mg).  Okay to continue Melatonin.    A referral for a colonoscopy has been placed.  Please call [phone] to schedule.    Please call Dr. Meyer and notify her of your symptoms.

## 2020-11-12 NOTE — PROGRESS NOTES
"INTERNAL MEDICINE ESTABLISHED PATIENT VISIT NOTE    Subjective:     Chief Complaint: Annual Exam       Patient ID: Nguyen Ivan is a 65 y.o. female with Breast CA s/p B mastectomy 11/2017 and s/p breast reconstruction, currently on Letrozole, fam hx breast CA, MVP (seen by Cards in the past at  and told it was no longer an issue, last echo many years ago), depression, hx liver lobectomy 2/2 "a growth on her liver" (path benign per pt and reports f/u imaging had been normal), baseline hearing impaired, chronic neck and back pain c hx herniated discs followed by Dr. Fairchild, last seen by me 5/2019 to [facility], here today for annual exam.    Has been on Effexor for depression in the past but currently states she has been feeling more anxious and feels like she needs something else.  Feels great about 5 days out of the week and gets more anxious about 2 days out of the week.  Is not interested in therapy at this time.    Today c c/o tenderness under her R axilla for the past 6 weeks.  States she had some sort of chest imaging (either CT or MRI, pt unsure) through Dr. Meyer at  due to having a positive genetic mutation and has a 4 mo f/u imaging planned for Dec.    Past Medical History:  Past Medical History:   Diagnosis Date    Breast cancer 12/2017    [facility]  Bilateral Mastectomy     Depression     Family history of breast cancer in sister     age 45,lumpectomy,XRT    H/O abnormal mammogram 10/30/2017    [facility] 4    Mitral valve prolapse        Home Medications:  Prior to Admission medications    Medication Sig Start Date End Date Taking? Authorizing Provider   amitriptyline (ELAVIL) 50 MG tablet TAKE 1 TABLET(50 MG) BY MOUTH EVERY EVENING 7/29/19   Lydia Reyes MD   gabapentin (NEURONTIN) 400 MG capsule TK ONE C PO  IN THE MORNING AND 3 CS PO HS 8/15/16   Historical Provider   letrozole (FEMARA) 2.5 mg Tab TK 1 T PO D 1/25/19   Historical Provider   MELATON-GENISTEIN-HERB NO.233 ORAL Take 1 " "tablet by mouth nightly as needed.    Historical Provider   venlafaxine (EFFEXOR-XR) 75 MG 24 hr capsule TAKE 2 CAPSULES BY MOUTH EVERY DAY 4/15/19   Donna Hernandez MD   venlafaxine (EFFEXOR-XR) 75 MG 24 hr capsule TAKE 2 CAPSULES BY MOUTH EVERY DAY 7/13/20   Lydia Reyes MD       Allergies:  Review of patient's allergies indicates:  No Known Allergies    Social History:  Social History     Tobacco Use    Smoking status: Never Smoker    Smokeless tobacco: Never Used   Substance Use Topics    Alcohol use: Yes     Comment: Social     Drug use: No        Review of Systems   Constitutional: Negative for appetite change, chills, fatigue, fever and unexpected weight change.   HENT: Negative for congestion, hearing loss and rhinorrhea.    Eyes: Negative for pain and visual disturbance.   Respiratory: Negative for cough, chest tightness, shortness of breath and wheezing.    Cardiovascular: Negative for chest pain, palpitations and leg swelling.   Gastrointestinal: Negative for abdominal distention and abdominal pain.   Endocrine: Negative for polydipsia and polyuria.   Genitourinary: Negative for decreased urine volume, difficulty urinating, dysuria, hematuria and vaginal discharge.   Neurological: Negative for weakness, numbness and headaches.   Psychiatric/Behavioral: Negative for behavioral problems and confusion.         Health Maintenance:     Immunizations:   Influenza rec today  TDap 5/2019  Prevnar 13 5/2019, rec pneumovax today  Shingrix rec in Jan.     Cancer Screening:  PAP: 8/2016 neg c neg HPV  Mammogram:  N/a, hx B mastectomy  Colonoscopy:  Done 2/1/11at [facility], due for f/u in Feb.  DEXA:  5/2019 osteopenia, will repeat next May    Objective:   /84   Pulse 84   Ht 5' 6" (1.676 m)   Wt 76.7 kg (169 lb 1.5 oz)   LMP  (LMP Unknown) Comment:   2003  SpO2 98%   BMI 27.29 kg/m²        General: AAO x3, no apparent distress  HEENT: PERRL, OP clear  CV: RRR, no m/r/g  Axilla: mild ttp in mid " axilla on R, no LN appreciated B  Pulm: Lungs CTAB, no crackles, no wheezes  Abd: s/NT/ND +BS  Extremities: no c/c/e    Labs:     Lab Results   Component Value Date    WBC 5.39 05/14/2019    HGB 13.7 05/14/2019    HCT 43.3 05/14/2019    MCV 91 05/14/2019     05/14/2019     Sodium   Date Value Ref Range Status   05/14/2019 142 136 - 145 mmol/L Final     Potassium   Date Value Ref Range Status   05/14/2019 4.4 3.5 - 5.1 mmol/L Final     Chloride   Date Value Ref Range Status   05/14/2019 106 95 - 110 mmol/L Final     CO2   Date Value Ref Range Status   05/14/2019 27 23 - 29 mmol/L Final     Glucose   Date Value Ref Range Status   05/14/2019 104 70 - 110 mg/dL Final     BUN   Date Value Ref Range Status   05/14/2019 16 8 - 23 mg/dL Final     Creatinine   Date Value Ref Range Status   05/14/2019 0.7 0.5 - 1.4 mg/dL Final     Calcium   Date Value Ref Range Status   05/14/2019 9.7 8.7 - 10.5 mg/dL Final     Total Protein   Date Value Ref Range Status   05/14/2019 7.1 6.0 - 8.4 g/dL Final     Albumin   Date Value Ref Range Status   05/14/2019 4.0 3.5 - 5.2 g/dL Final     Total Bilirubin   Date Value Ref Range Status   05/14/2019 0.6 0.1 - 1.0 mg/dL Final     Comment:     For infants and newborns, interpretation of results should be based  on gestational age, weight and in agreement with clinical  observations.  Premature Infant recommended reference ranges:  Up to 24 hours.............<8.0 mg/dL  Up to 48 hours............<12.0 mg/dL  3-5 days..................<15.0 mg/dL  6-29 days.................<15.0 mg/dL       Alkaline Phosphatase   Date Value Ref Range Status   05/14/2019 63 55 - 135 U/L Final     AST   Date Value Ref Range Status   05/14/2019 19 10 - 40 U/L Final     ALT   Date Value Ref Range Status   05/14/2019 27 10 - 44 U/L Final     Anion Gap   Date Value Ref Range Status   05/14/2019 9 8 - 16 mmol/L Final     eGFR if    Date Value Ref Range Status   05/14/2019 >60.0 >60 mL/min/1.73 m^2  Final     eGFR if non    Date Value Ref Range Status   05/14/2019 >60.0 >60 mL/min/1.73 m^2 Final     Comment:     Calculation used to obtain the estimated glomerular filtration  rate (eGFR) is the CKD-EPI equation.        No results found for: LABA1C, HGBA1C  Lab Results   Component Value Date    LDLCALC 120.0 05/14/2019     Lab Results   Component Value Date    TSH 0.868 05/14/2019         Assessment/Plan     Nguyen was seen today for annual exam.    Diagnoses and all orders for this visit:    Visit for annual health examination  History and physical exam completed.  Health maintenance reviewed as above.    Axillary tenderness, right  Exam unremarkable other than mild tenderness under R axilla.  Given her hx, advised to contact Dr. Meyer of  to see if Dec imaging for lung nodule can be done sooner.  Will send for u/s today  -     US Soft Tissue Axilla, Right; Future    History of breast cancer  History of bilateral mastectomy  As per HPI  Followed by Dr. Meyer at  c imaging this past summer c lung nodule c plan to f/u in Dec but will cont them as above.  -     US Soft Tissue Axilla, Right; Future    Anxiety  Previously doing well on Effexor but some recent increased anxiety, intemittent, will add xanax prn, advised to take sparingly  -     TSH; Future  -     ALPRAZolam (XANAX) 0.25 MG tablet; Take 1 tablet (0.25 mg total) by mouth nightly as needed for Anxiety.    Major depression, chronic  As above, depression controlled  -     TSH; Future    Osteopenia, unspecified location  Noted on last dexa, will check Vit D levels, rec wt bearing exercise as tolerated, currently on Ca supplement only?  -     Vitamin D; Future    Screening for diabetes mellitus  -     Comprehensive Metabolic Panel; Future    Screening, anemia, deficiency, iron  -     CBC Auto Differential; Future    Lipid screening  -     Lipid Panel; Future    Screen for colon cancer  -     Case request GI: COLONOSCOPY      HM as  above  RTC in 1 year, sooner if needed.   Pt to touch base c me regarding anxiety if needed for f/u appt    Teersa Biswas MD  Department of Internal Medicine - [facility]  11/12/2020

## 2020-11-12 NOTE — TELEPHONE ENCOUNTER
----- Message from Teresa Biswas MD sent at 11/12/2020 11:59 AM CST -----  Please contact pt to reschedule her axillary u/s, they said it has to be done at [facility].

## 2020-11-13 ENCOUNTER — LAB VISIT (OUTPATIENT)
Dept: LAB | Facility: HOSPITAL | Age: 65
End: 2020-11-13
Attending: INTERNAL MEDICINE
Payer: COMMERCIAL

## 2020-11-13 DIAGNOSIS — F41.9 ANXIETY: ICD-10-CM

## 2020-11-13 DIAGNOSIS — Z13.220 LIPID SCREENING: ICD-10-CM

## 2020-11-13 DIAGNOSIS — Z13.1 SCREENING FOR DIABETES MELLITUS: ICD-10-CM

## 2020-11-13 DIAGNOSIS — F32.9 MAJOR DEPRESSION, CHRONIC: ICD-10-CM

## 2020-11-13 DIAGNOSIS — M85.80 OSTEOPENIA, UNSPECIFIED LOCATION: ICD-10-CM

## 2020-11-13 DIAGNOSIS — Z13.0 SCREENING, ANEMIA, DEFICIENCY, IRON: ICD-10-CM

## 2020-11-13 LAB
25(OH)D3+25(OH)D2 SERPL-MCNC: 41 NG/ML (ref 30–96)
ALBUMIN SERPL BCP-MCNC: 4.3 G/DL (ref 3.5–5.2)
ALP SERPL-CCNC: 62 U/L (ref 55–135)
ALT SERPL W/O P-5'-P-CCNC: 25 U/L (ref 10–44)
ANION GAP SERPL CALC-SCNC: 7 MMOL/L (ref 8–16)
AST SERPL-CCNC: 20 U/L (ref 10–40)
BASOPHILS # BLD AUTO: 0.05 K/UL (ref 0–0.2)
BASOPHILS NFR BLD: 0.7 % (ref 0–1.9)
BILIRUB SERPL-MCNC: 0.8 MG/DL (ref 0.1–1)
BUN SERPL-MCNC: 19 MG/DL (ref 8–23)
CALCIUM SERPL-MCNC: 10 MG/DL (ref 8.7–10.5)
CHLORIDE SERPL-SCNC: 104 MMOL/L (ref 95–110)
CHOLEST SERPL-MCNC: 225 MG/DL (ref 120–199)
CHOLEST/HDLC SERPL: 3.9 {RATIO} (ref 2–5)
CO2 SERPL-SCNC: 29 MMOL/L (ref 23–29)
CREAT SERPL-MCNC: 0.7 MG/DL (ref 0.5–1.4)
DIFFERENTIAL METHOD: ABNORMAL
EOSINOPHIL # BLD AUTO: 0.2 K/UL (ref 0–0.5)
EOSINOPHIL NFR BLD: 2.4 % (ref 0–8)
ERYTHROCYTE [DISTWIDTH] IN BLOOD BY AUTOMATED COUNT: 13.7 % (ref 11.5–14.5)
EST. GFR  (AFRICAN AMERICAN): >60 ML/MIN/1.73 M^2
EST. GFR  (NON AFRICAN AMERICAN): >60 ML/MIN/1.73 M^2
GLUCOSE SERPL-MCNC: 109 MG/DL (ref 70–110)
HCT VFR BLD AUTO: 44.8 % (ref 37–48.5)
HDLC SERPL-MCNC: 58 MG/DL (ref 40–75)
HDLC SERPL: 25.8 % (ref 20–50)
HGB BLD-MCNC: 14.2 G/DL (ref 12–16)
IMM GRANULOCYTES # BLD AUTO: 0.02 K/UL (ref 0–0.04)
IMM GRANULOCYTES NFR BLD AUTO: 0.3 % (ref 0–0.5)
LDLC SERPL CALC-MCNC: 138 MG/DL (ref 63–159)
LYMPHOCYTES # BLD AUTO: 1.4 K/UL (ref 1–4.8)
LYMPHOCYTES NFR BLD: 20.4 % (ref 18–48)
MCH RBC QN AUTO: 29.1 PG (ref 27–31)
MCHC RBC AUTO-ENTMCNC: 31.7 G/DL (ref 32–36)
MCV RBC AUTO: 92 FL (ref 82–98)
MONOCYTES # BLD AUTO: 0.7 K/UL (ref 0.3–1)
MONOCYTES NFR BLD: 9.5 % (ref 4–15)
NEUTROPHILS # BLD AUTO: 4.7 K/UL (ref 1.8–7.7)
NEUTROPHILS NFR BLD: 66.7 % (ref 38–73)
NONHDLC SERPL-MCNC: 167 MG/DL
NRBC BLD-RTO: 0 /100 WBC
PLATELET # BLD AUTO: 283 K/UL (ref 150–350)
PMV BLD AUTO: 10.3 FL (ref 9.2–12.9)
POTASSIUM SERPL-SCNC: 4.2 MMOL/L (ref 3.5–5.1)
PROT SERPL-MCNC: 7.2 G/DL (ref 6–8.4)
RBC # BLD AUTO: 4.88 M/UL (ref 4–5.4)
SODIUM SERPL-SCNC: 140 MMOL/L (ref 136–145)
TRIGL SERPL-MCNC: 145 MG/DL (ref 30–150)
TSH SERPL DL<=0.005 MIU/L-ACNC: 0.65 UIU/ML (ref 0.4–4)
WBC # BLD AUTO: 7.05 K/UL (ref 3.9–12.7)

## 2020-11-13 PROCEDURE — 36415 COLL VENOUS BLD VENIPUNCTURE: CPT | Mod: PO

## 2020-11-13 PROCEDURE — 80053 COMPREHEN METABOLIC PANEL: CPT

## 2020-11-13 PROCEDURE — 82306 VITAMIN D 25 HYDROXY: CPT

## 2020-11-13 PROCEDURE — 80061 LIPID PANEL: CPT

## 2020-11-13 PROCEDURE — 85025 COMPLETE CBC W/AUTO DIFF WBC: CPT

## 2020-11-13 PROCEDURE — 84443 ASSAY THYROID STIM HORMONE: CPT

## 2021-01-28 ENCOUNTER — PATIENT MESSAGE (OUTPATIENT)
Dept: PHARMACY | Facility: CLINIC | Age: 66
End: 2021-01-28

## 2021-01-28 ENCOUNTER — IMMUNIZATION (OUTPATIENT)
Dept: PHARMACY | Facility: CLINIC | Age: 66
End: 2021-01-28
Payer: MEDICARE

## 2021-03-09 ENCOUNTER — PATIENT MESSAGE (OUTPATIENT)
Dept: INTERNAL MEDICINE | Facility: CLINIC | Age: 66
End: 2021-03-09

## 2021-03-09 DIAGNOSIS — Z12.11 SCREEN FOR COLON CANCER: Primary | ICD-10-CM

## 2021-03-18 ENCOUNTER — OFFICE VISIT (OUTPATIENT)
Dept: URGENT CARE | Facility: CLINIC | Age: 66
End: 2021-03-18
Payer: MEDICARE

## 2021-03-18 VITALS
HEART RATE: 105 BPM | SYSTOLIC BLOOD PRESSURE: 149 MMHG | WEIGHT: 161 LBS | HEIGHT: 66 IN | RESPIRATION RATE: 18 BRPM | TEMPERATURE: 99 F | BODY MASS INDEX: 25.88 KG/M2 | DIASTOLIC BLOOD PRESSURE: 93 MMHG | OXYGEN SATURATION: 96 %

## 2021-03-18 DIAGNOSIS — Z11.52 ENCOUNTER FOR SCREENING FOR COVID-19: ICD-10-CM

## 2021-03-18 DIAGNOSIS — K52.9 ACUTE GASTROENTERITIS: Primary | ICD-10-CM

## 2021-03-18 DIAGNOSIS — Z71.89 EDUCATED ABOUT COVID-19 VIRUS INFECTION: ICD-10-CM

## 2021-03-18 DIAGNOSIS — R19.7 NAUSEA VOMITING AND DIARRHEA: ICD-10-CM

## 2021-03-18 DIAGNOSIS — R11.2 NAUSEA VOMITING AND DIARRHEA: ICD-10-CM

## 2021-03-18 LAB
CTP QC/QA: YES
SARS-COV-2 RDRP RESP QL NAA+PROBE: NEGATIVE

## 2021-03-18 PROCEDURE — 3008F PR BODY MASS INDEX (BMI) DOCUMENTED: ICD-10-PCS | Mod: CPTII,S$GLB,, | Performed by: PHYSICIAN ASSISTANT

## 2021-03-18 PROCEDURE — U0002 COVID-19 LAB TEST NON-CDC: HCPCS | Mod: QW,CR,S$GLB, | Performed by: PHYSICIAN ASSISTANT

## 2021-03-18 PROCEDURE — 99214 OFFICE O/P EST MOD 30 MIN: CPT | Mod: S$GLB,CS,, | Performed by: PHYSICIAN ASSISTANT

## 2021-03-18 PROCEDURE — 3008F BODY MASS INDEX DOCD: CPT | Mod: CPTII,S$GLB,, | Performed by: PHYSICIAN ASSISTANT

## 2021-03-18 PROCEDURE — U0002: ICD-10-PCS | Mod: QW,CR,S$GLB, | Performed by: PHYSICIAN ASSISTANT

## 2021-03-18 PROCEDURE — 99214 PR OFFICE/OUTPT VISIT, EST, LEVL IV, 30-39 MIN: ICD-10-PCS | Mod: S$GLB,CS,, | Performed by: PHYSICIAN ASSISTANT

## 2021-03-18 RX ORDER — ONDANSETRON 4 MG/1
4 TABLET, ORALLY DISINTEGRATING ORAL EVERY 8 HOURS PRN
Qty: 20 TABLET | Refills: 0 | Status: SHIPPED | OUTPATIENT
Start: 2021-03-18 | End: 2021-08-18

## 2021-03-18 RX ORDER — DIPHENHYDRAMINE HCL 50 MG
50 CAPSULE ORAL
COMMUNITY
End: 2021-10-06

## 2021-03-18 RX ORDER — DICYCLOMINE HYDROCHLORIDE 10 MG/1
10 CAPSULE ORAL 2 TIMES DAILY PRN
Qty: 15 CAPSULE | Refills: 0 | Status: SHIPPED | OUTPATIENT
Start: 2021-03-18 | End: 2021-12-17

## 2021-04-15 ENCOUNTER — PATIENT MESSAGE (OUTPATIENT)
Dept: RESEARCH | Facility: HOSPITAL | Age: 66
End: 2021-04-15

## 2021-04-20 ENCOUNTER — IMMUNIZATION (OUTPATIENT)
Dept: PHARMACY | Facility: CLINIC | Age: 66
End: 2021-04-20
Payer: MEDICARE

## 2021-07-28 ENCOUNTER — TELEPHONE (OUTPATIENT)
Dept: INTERNAL MEDICINE | Facility: CLINIC | Age: 66
End: 2021-07-28

## 2021-07-28 ENCOUNTER — TELEPHONE (OUTPATIENT)
Dept: NEUROLOGY | Facility: CLINIC | Age: 66
End: 2021-07-28

## 2021-07-28 DIAGNOSIS — R41.3 MEMORY LOSS: Primary | ICD-10-CM

## 2021-07-29 ENCOUNTER — TELEPHONE (OUTPATIENT)
Dept: NEUROLOGY | Facility: CLINIC | Age: 66
End: 2021-07-29

## 2021-08-02 DIAGNOSIS — F41.9 ANXIETY: ICD-10-CM

## 2021-08-02 RX ORDER — ALPRAZOLAM 0.25 MG/1
0.25 TABLET ORAL NIGHTLY PRN
Qty: 30 TABLET | Refills: 0 | Status: SHIPPED | OUTPATIENT
Start: 2021-08-02 | End: 2021-10-06

## 2021-08-05 ENCOUNTER — PATIENT MESSAGE (OUTPATIENT)
Dept: INTERNAL MEDICINE | Facility: CLINIC | Age: 66
End: 2021-08-05

## 2021-08-17 ENCOUNTER — PATIENT OUTREACH (OUTPATIENT)
Dept: ADMINISTRATIVE | Facility: HOSPITAL | Age: 66
End: 2021-08-17

## 2021-08-18 ENCOUNTER — OFFICE VISIT (OUTPATIENT)
Dept: NEUROLOGY | Facility: CLINIC | Age: 66
End: 2021-08-18
Payer: MEDICARE

## 2021-08-18 ENCOUNTER — LAB VISIT (OUTPATIENT)
Dept: LAB | Facility: HOSPITAL | Age: 66
End: 2021-08-18
Attending: PSYCHIATRY & NEUROLOGY
Payer: MEDICARE

## 2021-08-18 VITALS
HEART RATE: 70 BPM | SYSTOLIC BLOOD PRESSURE: 132 MMHG | BODY MASS INDEX: 25.99 KG/M2 | DIASTOLIC BLOOD PRESSURE: 84 MMHG | HEIGHT: 66 IN

## 2021-08-18 DIAGNOSIS — R41.3 MEMORY LOSS: ICD-10-CM

## 2021-08-18 DIAGNOSIS — R41.3 OTHER AMNESIA: ICD-10-CM

## 2021-08-18 DIAGNOSIS — H91.90 HEARING LOSS, UNSPECIFIED HEARING LOSS TYPE, UNSPECIFIED LATERALITY: Primary | ICD-10-CM

## 2021-08-18 DIAGNOSIS — R41.89 SUBJECTIVE COGNITIVE IMPAIRMENT: ICD-10-CM

## 2021-08-18 LAB
APTT BLDCRRT: 22.8 SEC (ref 21–32)
FOLATE SERPL-MCNC: 19.1 NG/ML (ref 4–24)
HCYS SERPL-SCNC: 9.5 UMOL/L (ref 4–15.5)
T4 SERPL-MCNC: 8.2 UG/DL (ref 4.5–11.5)
TSH SERPL DL<=0.005 MIU/L-ACNC: 2.16 UIU/ML (ref 0.4–4)

## 2021-08-18 PROCEDURE — 99213 OFFICE O/P EST LOW 20 MIN: CPT | Mod: PBBFAC,25 | Performed by: PSYCHIATRY & NEUROLOGY

## 2021-08-18 PROCEDURE — 82746 ASSAY OF FOLIC ACID SERUM: CPT | Performed by: PSYCHIATRY & NEUROLOGY

## 2021-08-18 PROCEDURE — 36415 COLL VENOUS BLD VENIPUNCTURE: CPT | Performed by: PSYCHIATRY & NEUROLOGY

## 2021-08-18 PROCEDURE — 96116 PR NEUROBEHAVIORAL STATUS EXAM BY PSYCH/PHYS: ICD-10-PCS | Mod: S$PBB,,, | Performed by: PSYCHIATRY & NEUROLOGY

## 2021-08-18 PROCEDURE — 99999 PR PBB SHADOW E&M-EST. PATIENT-LVL III: CPT | Mod: PBBFAC,,, | Performed by: PSYCHIATRY & NEUROLOGY

## 2021-08-18 PROCEDURE — 84443 ASSAY THYROID STIM HORMONE: CPT | Performed by: PSYCHIATRY & NEUROLOGY

## 2021-08-18 PROCEDURE — 99205 OFFICE O/P NEW HI 60 MIN: CPT | Mod: S$PBB,,, | Performed by: PSYCHIATRY & NEUROLOGY

## 2021-08-18 PROCEDURE — 96132 PR NEUROPSYCHOLOGIC TEST EVAL SVCS, 1ST HR: ICD-10-PCS | Mod: ,,, | Performed by: PSYCHIATRY & NEUROLOGY

## 2021-08-18 PROCEDURE — 84436 ASSAY OF TOTAL THYROXINE: CPT | Performed by: PSYCHIATRY & NEUROLOGY

## 2021-08-18 PROCEDURE — 83921 ORGANIC ACID SINGLE QUANT: CPT | Performed by: PSYCHIATRY & NEUROLOGY

## 2021-08-18 PROCEDURE — 96132 NRPSYC TST EVAL PHYS/QHP 1ST: CPT | Mod: ,,, | Performed by: PSYCHIATRY & NEUROLOGY

## 2021-08-18 PROCEDURE — 87389 HIV-1 AG W/HIV-1&-2 AB AG IA: CPT | Performed by: PSYCHIATRY & NEUROLOGY

## 2021-08-18 PROCEDURE — 96116 NUBHVL XM PHYS/QHP 1ST HR: CPT | Mod: S$PBB,,, | Performed by: PSYCHIATRY & NEUROLOGY

## 2021-08-18 PROCEDURE — 83090 ASSAY OF HOMOCYSTEINE: CPT | Performed by: PSYCHIATRY & NEUROLOGY

## 2021-08-18 PROCEDURE — 85730 THROMBOPLASTIN TIME PARTIAL: CPT | Performed by: PSYCHIATRY & NEUROLOGY

## 2021-08-18 PROCEDURE — 99999 PR PBB SHADOW E&M-EST. PATIENT-LVL III: ICD-10-PCS | Mod: PBBFAC,,, | Performed by: PSYCHIATRY & NEUROLOGY

## 2021-08-18 PROCEDURE — 96116 NUBHVL XM PHYS/QHP 1ST HR: CPT | Mod: PBBFAC | Performed by: PSYCHIATRY & NEUROLOGY

## 2021-08-18 PROCEDURE — 99205 PR OFFICE/OUTPT VISIT, NEW, LEVL V, 60-74 MIN: ICD-10-PCS | Mod: S$PBB,,, | Performed by: PSYCHIATRY & NEUROLOGY

## 2021-08-18 RX ORDER — TRAMADOL HYDROCHLORIDE 50 MG/1
TABLET ORAL
COMMUNITY
Start: 2021-08-11 | End: 2022-04-19

## 2021-08-19 LAB
HIV 1+2 AB+HIV1 P24 AG SERPL QL IA: NEGATIVE
VIT B12 SERPL-MCNC: 745 NG/L (ref 180–914)

## 2021-08-20 LAB — TREPONEMA PALLIDUM IGG+IGM AB [PRESENCE] IN SERUM OR PLASMA BY IMMUNOASSAY: NONREACTIVE

## 2021-08-24 LAB — METHYLMALONATE SERPL-SCNC: 0.14 UMOL/L

## 2021-08-26 ENCOUNTER — TELEPHONE (OUTPATIENT)
Dept: NEUROLOGY | Facility: CLINIC | Age: 66
End: 2021-08-26

## 2021-09-04 ENCOUNTER — PATIENT MESSAGE (OUTPATIENT)
Dept: NEUROLOGY | Facility: CLINIC | Age: 66
End: 2021-09-04

## 2021-10-04 ENCOUNTER — HOSPITAL ENCOUNTER (OUTPATIENT)
Dept: RADIOLOGY | Facility: HOSPITAL | Age: 66
Discharge: HOME | End: 2021-10-04
Attending: PSYCHIATRY & NEUROLOGY
Payer: MEDICARE

## 2021-10-04 DIAGNOSIS — R41.3 OTHER AMNESIA: ICD-10-CM

## 2021-10-04 PROCEDURE — 70551 MRI BRAIN WITHOUT CONTRAST: ICD-10-PCS | Mod: 26,MG,, | Performed by: RADIOLOGY

## 2021-10-04 PROCEDURE — G1004 CDSM NDSC: HCPCS

## 2021-10-04 PROCEDURE — 70551 MRI BRAIN STEM W/O DYE: CPT | Mod: 26,MG,, | Performed by: RADIOLOGY

## 2021-10-04 PROCEDURE — G1004 CDSM NDSC: HCPCS | Mod: ,,, | Performed by: RADIOLOGY

## 2021-10-04 PROCEDURE — G1004 MRI BRAIN WITHOUT CONTRAST: ICD-10-PCS | Mod: ,,, | Performed by: RADIOLOGY

## 2021-10-05 ENCOUNTER — OFFICE VISIT (OUTPATIENT)
Dept: NEUROLOGY | Facility: CLINIC | Age: 66
End: 2021-10-05
Payer: COMMERCIAL

## 2021-10-05 ENCOUNTER — TELEPHONE (OUTPATIENT)
Dept: NEUROLOGY | Facility: CLINIC | Age: 66
End: 2021-10-05

## 2021-10-05 DIAGNOSIS — R41.89 COGNITIVE AND BEHAVIORAL CHANGES: Primary | ICD-10-CM

## 2021-10-05 DIAGNOSIS — R46.89 COGNITIVE AND BEHAVIORAL CHANGES: Primary | ICD-10-CM

## 2021-10-05 DIAGNOSIS — Z81.8 FAMILY HISTORY OF FIRST DEGREE RELATIVE WITH DEMENTIA: ICD-10-CM

## 2021-10-05 DIAGNOSIS — Z81.8 FAMILY HISTORY OF DEMENTIA: ICD-10-CM

## 2021-10-05 PROCEDURE — 1159F MED LIST DOCD IN RCRD: CPT | Mod: CPTII,95,, | Performed by: PSYCHIATRY & NEUROLOGY

## 2021-10-05 PROCEDURE — 99499 NO LOS: ICD-10-PCS | Mod: 95,,, | Performed by: PSYCHIATRY & NEUROLOGY

## 2021-10-05 PROCEDURE — 1160F PR REVIEW ALL MEDS BY PRESCRIBER/CLIN PHARMACIST DOCUMENTED: ICD-10-PCS | Mod: CPTII,95,, | Performed by: PSYCHIATRY & NEUROLOGY

## 2021-10-05 PROCEDURE — 96116 NUBHVL XM PHYS/QHP 1ST HR: CPT | Mod: 95,,, | Performed by: PSYCHIATRY & NEUROLOGY

## 2021-10-05 PROCEDURE — 1160F RVW MEDS BY RX/DR IN RCRD: CPT | Mod: CPTII,95,, | Performed by: PSYCHIATRY & NEUROLOGY

## 2021-10-05 PROCEDURE — 96116 PR NEUROBEHAVIORAL STATUS EXAM BY PSYCH/PHYS: ICD-10-PCS | Mod: 95,,, | Performed by: PSYCHIATRY & NEUROLOGY

## 2021-10-05 PROCEDURE — 1159F PR MEDICATION LIST DOCUMENTED IN MEDICAL RECORD: ICD-10-PCS | Mod: CPTII,95,, | Performed by: PSYCHIATRY & NEUROLOGY

## 2021-10-05 PROCEDURE — 99499 UNLISTED E&M SERVICE: CPT | Mod: 95,,, | Performed by: PSYCHIATRY & NEUROLOGY

## 2021-10-06 ENCOUNTER — PATIENT MESSAGE (OUTPATIENT)
Dept: NEUROLOGY | Facility: CLINIC | Age: 66
End: 2021-10-06

## 2021-10-19 ENCOUNTER — TELEPHONE (OUTPATIENT)
Dept: INTERNAL MEDICINE | Facility: CLINIC | Age: 66
End: 2021-10-19

## 2021-10-21 ENCOUNTER — OFFICE VISIT (OUTPATIENT)
Dept: INTERNAL MEDICINE | Facility: CLINIC | Age: 66
End: 2021-10-21
Payer: COMMERCIAL

## 2021-10-21 VITALS
HEART RATE: 95 BPM | RESPIRATION RATE: 16 BRPM | OXYGEN SATURATION: 99 % | DIASTOLIC BLOOD PRESSURE: 80 MMHG | BODY MASS INDEX: 25.05 KG/M2 | HEIGHT: 66 IN | SYSTOLIC BLOOD PRESSURE: 130 MMHG | WEIGHT: 155.88 LBS

## 2021-10-21 DIAGNOSIS — Z01.30 BLOOD PRESSURE CHECK: Primary | ICD-10-CM

## 2021-10-21 DIAGNOSIS — Z12.11 COLON CANCER SCREENING: ICD-10-CM

## 2021-10-21 PROCEDURE — 99999 PR PBB SHADOW E&M-EST. PATIENT-LVL IV: CPT | Mod: PBBFAC,,, | Performed by: PHYSICIAN ASSISTANT

## 2021-10-21 PROCEDURE — 99213 OFFICE O/P EST LOW 20 MIN: CPT | Mod: S$PBB,,, | Performed by: PHYSICIAN ASSISTANT

## 2021-10-21 PROCEDURE — 99214 OFFICE O/P EST MOD 30 MIN: CPT | Mod: PBBFAC | Performed by: PHYSICIAN ASSISTANT

## 2021-10-21 PROCEDURE — 99213 PR OFFICE/OUTPT VISIT, EST, LEVL III, 20-29 MIN: ICD-10-PCS | Mod: S$PBB,,, | Performed by: PHYSICIAN ASSISTANT

## 2021-10-21 PROCEDURE — 99999 PR PBB SHADOW E&M-EST. PATIENT-LVL IV: ICD-10-PCS | Mod: PBBFAC,,, | Performed by: PHYSICIAN ASSISTANT

## 2021-11-02 ENCOUNTER — PATIENT MESSAGE (OUTPATIENT)
Dept: INTERNAL MEDICINE | Facility: CLINIC | Age: 66
End: 2021-11-02
Payer: MEDICARE

## 2021-11-02 DIAGNOSIS — R79.9 ABNORMAL FINDING OF BLOOD CHEMISTRY, UNSPECIFIED: ICD-10-CM

## 2021-11-02 DIAGNOSIS — Z13.220 LIPID SCREENING: ICD-10-CM

## 2021-11-02 DIAGNOSIS — I10 BENIGN ESSENTIAL HYPERTENSION: Primary | ICD-10-CM

## 2021-11-08 ENCOUNTER — PATIENT MESSAGE (OUTPATIENT)
Dept: INTERNAL MEDICINE | Facility: CLINIC | Age: 66
End: 2021-11-08
Payer: MEDICARE

## 2021-11-08 RX ORDER — LOSARTAN POTASSIUM 25 MG/1
25 TABLET ORAL DAILY
Qty: 90 TABLET | Refills: 3 | Status: SHIPPED | OUTPATIENT
Start: 2021-11-08 | End: 2022-11-07

## 2021-11-09 ENCOUNTER — PATIENT MESSAGE (OUTPATIENT)
Dept: INTERNAL MEDICINE | Facility: CLINIC | Age: 66
End: 2021-11-09
Payer: MEDICARE

## 2021-11-15 ENCOUNTER — TELEPHONE (OUTPATIENT)
Dept: INTERNAL MEDICINE | Facility: CLINIC | Age: 66
End: 2021-11-15
Payer: MEDICARE

## 2021-11-15 ENCOUNTER — PATIENT MESSAGE (OUTPATIENT)
Dept: INTERNAL MEDICINE | Facility: CLINIC | Age: 66
End: 2021-11-15
Payer: MEDICARE

## 2021-11-17 ENCOUNTER — PATIENT MESSAGE (OUTPATIENT)
Dept: INTERNAL MEDICINE | Facility: CLINIC | Age: 66
End: 2021-11-17
Payer: MEDICARE

## 2021-12-16 ENCOUNTER — OFFICE VISIT (OUTPATIENT)
Dept: INTERNAL MEDICINE | Facility: CLINIC | Age: 66
End: 2021-12-16
Payer: MEDICARE

## 2021-12-16 ENCOUNTER — TELEPHONE (OUTPATIENT)
Dept: INTERNAL MEDICINE | Facility: CLINIC | Age: 66
End: 2021-12-16
Payer: MEDICARE

## 2021-12-16 ENCOUNTER — HOSPITAL ENCOUNTER (OUTPATIENT)
Dept: RADIOLOGY | Facility: HOSPITAL | Age: 66
Discharge: HOME | End: 2021-12-16
Attending: NURSE PRACTITIONER
Payer: MEDICARE

## 2021-12-16 VITALS
WEIGHT: 158.31 LBS | DIASTOLIC BLOOD PRESSURE: 82 MMHG | HEIGHT: 66 IN | HEART RATE: 76 BPM | SYSTOLIC BLOOD PRESSURE: 122 MMHG | BODY MASS INDEX: 25.44 KG/M2 | OXYGEN SATURATION: 97 %

## 2021-12-16 DIAGNOSIS — F32.9 MAJOR DEPRESSION, CHRONIC: ICD-10-CM

## 2021-12-16 DIAGNOSIS — Z01.818 PREOPERATIVE CLEARANCE: Primary | ICD-10-CM

## 2021-12-16 DIAGNOSIS — R91.1 LUNG NODULE: ICD-10-CM

## 2021-12-16 DIAGNOSIS — Z90.13 HISTORY OF BILATERAL MASTECTOMY: ICD-10-CM

## 2021-12-16 DIAGNOSIS — Z85.3 HISTORY OF BREAST CANCER: ICD-10-CM

## 2021-12-16 DIAGNOSIS — Z01.818 PREOPERATIVE CLEARANCE: ICD-10-CM

## 2021-12-16 DIAGNOSIS — R94.31 ABNORMAL EKG: ICD-10-CM

## 2021-12-16 DIAGNOSIS — F41.9 ANXIETY: ICD-10-CM

## 2021-12-16 DIAGNOSIS — M85.80 OSTEOPENIA, UNSPECIFIED LOCATION: ICD-10-CM

## 2021-12-16 DIAGNOSIS — I10 BENIGN ESSENTIAL HYPERTENSION: ICD-10-CM

## 2021-12-16 LAB
BILIRUB UR QL STRIP: NEGATIVE
CLARITY UR REFRACT.AUTO: CLEAR
COLOR UR AUTO: YELLOW
GLUCOSE UR QL STRIP: NEGATIVE
HGB UR QL STRIP: NEGATIVE
KETONES UR QL STRIP: NEGATIVE
LEUKOCYTE ESTERASE UR QL STRIP: NEGATIVE
NITRITE UR QL STRIP: NEGATIVE
PH UR STRIP: 5 [PH] (ref 5–8)
PROT UR QL STRIP: NEGATIVE
SP GR UR STRIP: >=1.03 (ref 1–1.03)
URN SPEC COLLECT METH UR: ABNORMAL

## 2021-12-16 PROCEDURE — 71046 X-RAY EXAM CHEST 2 VIEWS: CPT | Mod: 26,,, | Performed by: RADIOLOGY

## 2021-12-16 PROCEDURE — 99999 PR PBB SHADOW E&M-EST. PATIENT-LVL V: ICD-10-PCS | Mod: PBBFAC,,, | Performed by: NURSE PRACTITIONER

## 2021-12-16 PROCEDURE — 93010 EKG 12-LEAD: ICD-10-PCS | Mod: S$PBB,,, | Performed by: INTERNAL MEDICINE

## 2021-12-16 PROCEDURE — 99215 OFFICE O/P EST HI 40 MIN: CPT | Mod: PBBFAC,25 | Performed by: NURSE PRACTITIONER

## 2021-12-16 PROCEDURE — 71046 XR CHEST PA AND LATERAL: ICD-10-PCS | Mod: 26,,, | Performed by: RADIOLOGY

## 2021-12-16 PROCEDURE — 71046 X-RAY EXAM CHEST 2 VIEWS: CPT | Mod: TC

## 2021-12-16 PROCEDURE — 93010 ELECTROCARDIOGRAM REPORT: CPT | Mod: S$PBB,,, | Performed by: INTERNAL MEDICINE

## 2021-12-16 PROCEDURE — 99214 PR OFFICE/OUTPT VISIT, EST, LEVL IV, 30-39 MIN: ICD-10-PCS | Mod: S$PBB,,, | Performed by: NURSE PRACTITIONER

## 2021-12-16 PROCEDURE — 81003 URINALYSIS AUTO W/O SCOPE: CPT | Performed by: NURSE PRACTITIONER

## 2021-12-16 PROCEDURE — 93005 ELECTROCARDIOGRAM TRACING: CPT | Mod: PBBFAC | Performed by: INTERNAL MEDICINE

## 2021-12-16 PROCEDURE — 99214 OFFICE O/P EST MOD 30 MIN: CPT | Mod: S$PBB,,, | Performed by: NURSE PRACTITIONER

## 2021-12-16 PROCEDURE — 99999 PR PBB SHADOW E&M-EST. PATIENT-LVL V: CPT | Mod: PBBFAC,,, | Performed by: NURSE PRACTITIONER

## 2021-12-16 RX ORDER — DEXTROMETHORPHAN HYDROBROMIDE, GUAIFENESIN 5; 100 MG/5ML; MG/5ML
LIQUID ORAL
COMMUNITY
Start: 2021-07-01

## 2021-12-16 RX ORDER — ALPRAZOLAM 0.25 MG/1
TABLET ORAL
COMMUNITY
End: 2022-03-24 | Stop reason: SDUPTHER

## 2021-12-17 ENCOUNTER — OFFICE VISIT (OUTPATIENT)
Dept: CARDIOLOGY | Facility: CLINIC | Age: 66
End: 2021-12-17
Payer: COMMERCIAL

## 2021-12-17 VITALS
RESPIRATION RATE: 20 BRPM | BODY MASS INDEX: 25.22 KG/M2 | SYSTOLIC BLOOD PRESSURE: 126 MMHG | DIASTOLIC BLOOD PRESSURE: 77 MMHG | WEIGHT: 156.94 LBS | HEIGHT: 66 IN | HEART RATE: 80 BPM

## 2021-12-17 DIAGNOSIS — Z01.818 PREOPERATIVE CLEARANCE: ICD-10-CM

## 2021-12-17 DIAGNOSIS — R94.31 ABNORMAL EKG: ICD-10-CM

## 2021-12-17 DIAGNOSIS — I10 PRIMARY HYPERTENSION: ICD-10-CM

## 2021-12-17 DIAGNOSIS — Z01.810 PREOPERATIVE CARDIOVASCULAR EXAMINATION: ICD-10-CM

## 2021-12-17 DIAGNOSIS — E78.49 OTHER HYPERLIPIDEMIA: ICD-10-CM

## 2021-12-17 PROCEDURE — 99999 PR PBB SHADOW E&M-EST. PATIENT-LVL III: ICD-10-PCS | Mod: PBBFAC,,, | Performed by: INTERNAL MEDICINE

## 2021-12-17 PROCEDURE — 99213 OFFICE O/P EST LOW 20 MIN: CPT | Mod: PBBFAC,PO | Performed by: INTERNAL MEDICINE

## 2021-12-17 PROCEDURE — 99999 PR PBB SHADOW E&M-EST. PATIENT-LVL III: CPT | Mod: PBBFAC,,, | Performed by: INTERNAL MEDICINE

## 2021-12-17 PROCEDURE — 99204 OFFICE O/P NEW MOD 45 MIN: CPT | Mod: S$PBB,,, | Performed by: INTERNAL MEDICINE

## 2021-12-17 PROCEDURE — 99204 PR OFFICE/OUTPT VISIT, NEW, LEVL IV, 45-59 MIN: ICD-10-PCS | Mod: S$PBB,,, | Performed by: INTERNAL MEDICINE

## 2021-12-17 RX ORDER — ROSUVASTATIN CALCIUM 10 MG/1
10 TABLET, COATED ORAL DAILY
Qty: 90 TABLET | Refills: 3 | Status: SHIPPED | OUTPATIENT
Start: 2021-12-17 | End: 2022-04-19

## 2021-12-21 ENCOUNTER — TELEPHONE (OUTPATIENT)
Dept: INTERNAL MEDICINE | Facility: CLINIC | Age: 66
End: 2021-12-21
Payer: MEDICARE

## 2021-12-28 ENCOUNTER — TELEPHONE (OUTPATIENT)
Dept: INTERNAL MEDICINE | Facility: CLINIC | Age: 66
End: 2021-12-28
Payer: MEDICARE

## 2021-12-30 ENCOUNTER — PATIENT MESSAGE (OUTPATIENT)
Dept: CARDIOLOGY | Facility: CLINIC | Age: 66
End: 2021-12-30
Payer: MEDICARE

## 2021-12-30 ENCOUNTER — PATIENT MESSAGE (OUTPATIENT)
Dept: INTERNAL MEDICINE | Facility: CLINIC | Age: 66
End: 2021-12-30
Payer: MEDICARE

## 2022-01-05 ENCOUNTER — TELEPHONE (OUTPATIENT)
Dept: INTERNAL MEDICINE | Facility: CLINIC | Age: 67
End: 2022-01-05
Payer: MEDICARE

## 2022-01-05 NOTE — TELEPHONE ENCOUNTER
----- Message from Ely Umana sent at 1/5/2022  2:03 PM CST -----  Contact: pt- [phone]  Patient is returning a phone call.    Who left a message for the patient: nurse    Does patient know what this is regarding:  yes    Would you like a call back, or a response through your [facility] portal?:  call     Comments:  PT states cardiologist didn't recommend a stress echo. She is requesting Ms. Dawn Noland to call her today to discuss.

## 2022-01-05 NOTE — TELEPHONE ENCOUNTER
Spoke with pt - cardiology states she does not need a stress test prior to surgery. I will cancel the request for the stress test   All forms were faxed to Dr Hyman office

## 2022-01-11 ENCOUNTER — PATIENT MESSAGE (OUTPATIENT)
Dept: INTERNAL MEDICINE | Facility: CLINIC | Age: 67
End: 2022-01-11
Payer: MEDICARE

## 2022-01-11 DIAGNOSIS — R11.0 NAUSEA: Primary | ICD-10-CM

## 2022-01-11 DIAGNOSIS — R19.7 DIARRHEA OF PRESUMED INFECTIOUS ORIGIN: Primary | ICD-10-CM

## 2022-01-11 RX ORDER — ONDANSETRON 4 MG/1
4 TABLET, ORALLY DISINTEGRATING ORAL EVERY 8 HOURS PRN
Qty: 30 TABLET | Refills: 0 | Status: SHIPPED | OUTPATIENT
Start: 2022-01-11 | End: 2022-01-21

## 2022-01-12 ENCOUNTER — PATIENT MESSAGE (OUTPATIENT)
Dept: ADMINISTRATIVE | Facility: OTHER | Age: 67
End: 2022-01-12
Payer: MEDICARE

## 2022-01-17 ENCOUNTER — PATIENT MESSAGE (OUTPATIENT)
Dept: INTERNAL MEDICINE | Facility: CLINIC | Age: 67
End: 2022-01-17
Payer: MEDICARE

## 2022-01-17 RX ORDER — CIPROFLOXACIN 500 MG/1
500 TABLET ORAL 2 TIMES DAILY
Qty: 10 TABLET | Refills: 0 | Status: SHIPPED | OUTPATIENT
Start: 2022-01-17 | End: 2022-01-22

## 2022-03-19 NOTE — TELEPHONE ENCOUNTER
Care Due:                  Date            Visit Type   Department     Provider  --------------------------------------------------------------------------------                                EP -                              PRIMARY      [facility] INTERNAL  Last Visit: 11-      CARE ([facility])   MEDICINE       Chinyere Biswas  Next Visit: None Scheduled  None         None Found                                                            Last  Test          Frequency    Reason                     Performed    Due Date  --------------------------------------------------------------------------------    Office Visit  12 months..  losartan.................  11- 11-    Powered by [employer]. Reference number: [id number].   3/19/2022 11:29:08 AM CDT

## 2022-03-24 RX ORDER — ALPRAZOLAM 0.25 MG/1
0.25 TABLET ORAL NIGHTLY
Qty: 30 TABLET | Refills: 0 | Status: SHIPPED | OUTPATIENT
Start: 2022-03-24 | End: 2023-07-31 | Stop reason: SDUPTHER

## 2022-03-24 RX ORDER — ALPRAZOLAM 0.25 MG/1
TABLET ORAL
Qty: 30 TABLET | OUTPATIENT
Start: 2022-03-24

## 2022-03-24 NOTE — TELEPHONE ENCOUNTER
No new care gaps identified.  Powered by [employer]. Reference number: [id number].   3/24/2022 10:22:03 AM CDT

## 2022-04-02 ENCOUNTER — PATIENT MESSAGE (OUTPATIENT)
Dept: NEUROLOGY | Facility: CLINIC | Age: 67
End: 2022-04-02
Payer: COMMERCIAL

## 2022-04-19 ENCOUNTER — OFFICE VISIT (OUTPATIENT)
Dept: NEUROLOGY | Facility: CLINIC | Age: 67
End: 2022-04-19
Payer: COMMERCIAL

## 2022-04-19 DIAGNOSIS — E55.9 VITAMIN D DEFICIENCY: ICD-10-CM

## 2022-04-19 DIAGNOSIS — R41.89 SUBJECTIVE COGNITIVE IMPAIRMENT: Primary | ICD-10-CM

## 2022-04-19 DIAGNOSIS — G47.00 INSOMNIA, UNSPECIFIED TYPE: ICD-10-CM

## 2022-04-19 DIAGNOSIS — N95.9 POST MENOPAUSAL PROBLEMS: ICD-10-CM

## 2022-04-19 DIAGNOSIS — T88.7XXA MEDICATION SIDE EFFECT: ICD-10-CM

## 2022-04-19 PROCEDURE — 99215 PR OFFICE/OUTPT VISIT, EST, LEVL V, 40-54 MIN: ICD-10-PCS | Mod: 95,,, | Performed by: PSYCHIATRY & NEUROLOGY

## 2022-04-19 PROCEDURE — 96116 NUBHVL XM PHYS/QHP 1ST HR: CPT | Mod: 59,95,, | Performed by: PSYCHIATRY & NEUROLOGY

## 2022-04-19 PROCEDURE — 99215 OFFICE O/P EST HI 40 MIN: CPT | Mod: 95,,, | Performed by: PSYCHIATRY & NEUROLOGY

## 2022-04-19 PROCEDURE — 96116 PR NEUROBEHAVIORAL STATUS EXAM BY PSYCH/PHYS: ICD-10-PCS | Mod: 59,95,, | Performed by: PSYCHIATRY & NEUROLOGY

## 2022-04-19 RX ORDER — MULTIVIT WITH MINERALS/HERBS
1 TABLET ORAL DAILY
Qty: 30 TABLET | Refills: 3 | Status: SHIPPED | OUTPATIENT
Start: 2022-04-19 | End: 2022-11-01

## 2022-04-19 RX ORDER — SUVOREXANT 10 MG/1
1 TABLET, FILM COATED ORAL NIGHTLY
Qty: 30 TABLET | Refills: 3 | Status: SHIPPED | OUTPATIENT
Start: 2022-04-19 | End: 2022-06-16

## 2022-04-19 NOTE — PROGRESS NOTES
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      Hemoglobin 2021, Dec-16    14.0 [12.0 - 16.0 g/dL]      MCV 2021, Dec-16    97 [82 - 98 fL]      Platelets 2021, Dec-16    329 [150 - 450 K/uL]      Neuroendocrine/Electrolyte Screening   BUN 2021, Dec-16    15 [8 - 23 mg/dL]      Chloride 2021, Dec-16    102 [95 - 110 mmol/L]      Creatinine 2021, Dec-16    0.7 [0.5 - 1.4 mg/dL]      Potassium 2021, Dec-16    4.0 [3.5 - 5.1 mmol/L]      Sodium 2021, Dec-16    139 [136 - 145 mmol/L]      Delirium Screening   Glucose, UA 2021, Dec-16    Negative      Ketones, UA 2021, Dec-16    Negative      Leukocytes, UA 2021, Dec-16    Negative      NITRITE UA 2021, Dec-16    Negative      Protein, UA 2021, Dec-16    Negative           Neuroimaging:    MRI brain/head without contrast on 9/6/2012   Formal interpretation by Radiology:   not available   Independently reviewed radiological imaging by Suraj Ravi MD. MPH.  Behavioral Neurologist   T1: No significant cortical atrophy with age-appropriate changes. Subcortical nuclei and hippocampi are without significant atrophy.   T2/FLAIR: No Significant hyperintensities appreciated on MRI T2/FLAIR   DWI/ADC: No Significant DWI hyperintensities/hypointensities. No ADC correlation.   SWI/GRE: No Significant hypointensities to suggest cortical/subcortical hemosiderin deposition.   Impression: : Normal Brain Imaging.    CT brain/head without contrast on 01/01/2019   Formal interpretation by Radiology:   not available   Independently reviewed radiological imaging by Suraj Ravi MD. MPH. Behavioral Neurologist   Impression: : No significant cortical atrophy with age-appropriate changes. Subcortical nuclei and hippocampi are without significant atrophy.    MRI brain/head without contrast on 10/04/2021   Technique: Multiplanar multisequence MR imaging of the brain was performed without intravenous contrast.   Formal interpretation by Radiology:   No evidence of acute or chronic intracranial pathology.   Independently reviewed radiological imaging by Suraj Ravi MD. MPH. Behavioral Neurologist   T1: No significant cortical atrophy with age-appropriate changes. Subcortical nuclei and hippocampi are without significant atrophy.   T2/FLAIR: No Significant hyperintensities appreciated on MRI T2/FLAIR   DWI/ADC: No Significant DWI hyperintensities/hypointensities. No ADC correlation.   SWI/GRE: No Significant hypointensities to suggest cortical/subcortical hemosiderin deposition.   Impression: : Normal Brain Imaging.     Procedures:    Electrocardiogram on 12/16/2021   Formal interpretation:   Vent. Rate : 067 BPM     Atrial Rate : 067 BPM    P-R Int : 148 ms          QRS Dur : 084 ms     QT Int : 398 ms       P-R-T Axes : 061 043 060 degrees    QTc Int : 420 ms Normal sinus rhythm Possible Left atrial enlargement Nonspecific ST and/or T wave abnormalities  Abnormal ECG   Independently reviewed Electrocardiogram by Suraj Ravi MD. MPH. Behavioral Neurologist   Impression: : Received ECG has no evidence of sinus node disease. HR (>=50-60). Prolonged MS interval (>0.22 s). Broad QRS complex (> 0.12 s).     Clinical Summary:     Ms. Ivan is a 67-year-old right-handed female with a relevant past medical history of Breast CA s/p B mastectomy on letrozole, Depression/Anxiety, who presents reporting a 2-year history of gradually progressive neurocognitive impairment.       The clinical history is suggestive of:   Executive Impairment: Working Memory   Language Impairment: Energization   Psychiatric Impairment: Neurovegetative, Social Coherence, Signal-Noise Dysregulation, Mood Regulation, Stimulation Dysregulation  The neurological examination is significant for:   Normal Neurological Examination  Informal neuropsychology battery is positive (based on age and education) for:   Subjective Cognitive impairment - Questionable Visuospatial Impairment: visuospatial construction.   BEHAV5+ 2/6: See ROS section for a full description  Neurological imaging   MRI brain/head without contrast (9/6/2012): Normal Brain Imaging.   CT brain/head without contrast (01/01/2019): No significant cortical atrophy with age-appropriate changes. Subcortical nuclei and hippocampi are without significant atrophy.   MRI brain/head without contrast (10/04/2021): Normal Brain Imaging.        Assessment:        Ms. Ivan's clinical presentation is amnestic predominant subjective cognitive impairment (CDR-SOB: 0.5 - Questionable cognitive impairment).     The pathology underlying Ms. Leroys cognitive impairment is unclear at this time. The neurobehavioral effects of aromatase inhibitors (AI) likely depend upon when during the lifespan such drugs are administered. Post-menopausal women treated with such drugs are more likely to show memory deficits, increased pain  response, and other emotional responses. That said, while great effort has been invested in studying cognitive dysfunction and AI therapy, controlling confounders such as prior chemotherapy received and having breast cancer is necessary to fully determine whether any association exists.      The observations been above discussed the patient. As patient's last neurocognitive assessment, symptoms are consistent to check impairment. The patient reports a prolonged history postmenopausal symptoms include fatigue and brain fog. Symptoms appear to worsen while on aromatase therapy over the last 5 years. Come presentation the patient continues report bothersome working memory deficits and difficulty. Discussed over-the-counter strategies for managing estrogen deprivation related cognitive impairment however recommend discussing with OBGYN and oncologist before starting any over-the-counter supplements that might interfere with aromatase therapy. Recommend starting vitamin D3 and B complex multivitamin for general brain health and their potential to endogenous estrogen. We discussed Phytoestrogen-Rich Foods. Given patient's recent back surgery, recommend pursuing biomarkers after elective lumbar puncture has been deemed safe by Neurosurgery. In the interim, recommend trial of Belsomra for insomnia.        Care Management Plan:     #Behavioral Disorder Treatment:   No indication for memantine at this time  #Insomnia Treatment:   We recommend a trial of Belsomra 10mg qHS  #Neurocognitive Disorder Treatment:   We have discussed opportunities for further testing with CSF biomarkers or Amyloid-PET   Start B-complex multivitamin   Start D3   Following neurosurgery approval of LP, will schedule LP for CSF testing  #Behavioral/Environmental Treatment   We recommend engaging in activities that stimulate cognitively and socially while avoiding excessive stimulation and fatigue in overwhelmingly complex situations.   We  recommend integrating routine and schedule into your daily life. https://www.alzheimersproject.org/news/the-importance-of-routine-and-familiarity-to-persons-with-dementia/  #Health Maintenance/Lifestyle Advice   We have discussed the value in aggressively controlling vascular risk factors like hypertension, hyperlipidemia, and Diabetes SBP<130, LDL<100, A1C<7.0.   We discussed the need to optimize lifestyle choices including a heart-healthy diet (e.g., Mediterranean or DASH), increased cardiovascular exercise (goal 150 minutes of moderate-intensity per week), and stay cognitively and socially active.  #Support   We all need support sometimes. Get easy access to local resources, community programs, and services. https://www.communityresourcefinder.org/   Learn more about Cognitive Impairment in [place]: https://www.alz.org/professionals/public-health/state-overview[place]  #Follow up:   Follow-up in 24 weeks (Oct 2022).    Thank you for allowing us to participate in the care of your patient. Please do not hesitate to contact us with any questions or concerns.     It was a pleasure seeing Ms. Ivan and we look forward to seeing them at their follow-up visit.     This note is dictated on M*Modal Fluency Direct word recognition program. There are word recognition mistakes that are occasionally missed on review.      Scheduled Follow-up :  No future appointments.    After Visit Medication List :     Medication List          Accurate as of April 19, 2022  2:44 PM. If you have any questions, ask your nurse or doctor.            CONTINUE taking these medications    acetaminophen 650 MG Tbsr  Commonly known as: TYLENOL     ALPRAZolam 0.25 MG tablet  Commonly known as: XANAX  Take 1 tablet (0.25 mg total) by mouth every evening.     letrozole 2.5 mg Tab  Commonly known as: FEMARA     losartan 25 MG tablet  Commonly known as: COZAAR  Take 1 tablet (25 mg total) by mouth once daily.     venlafaxine 75 MG 24 hr  capsule  Commonly known as: EFFEXOR-XR  TAKE 2 CAPSULES BY MOUTH EVERY DAY            Signing Physician:  Suraj Obrien MD    Billing:    -----------------------------------------------------------------------------    I performed this consultation using real-time Telehealth tools, including a live video connection between my location and the patient's location. Prior to initiating the consultation, I obtained informed verbal consent to perform this consultation using Telehealth tools and answered all the questions about the Telehealth interaction. The participants understand that only a limited neurological exam and limited neuropsychological testing can be performed using Telehealth tools.    I spent a total of 45 minutes (time-in: 14:00 PM; time-out: 14:45 PM) on 04/19/2022, in-person face-to-face with the patient and caregiver(s), >50% of that time was spent counseling regarding the symptoms, treatment plan, risks, therapeutic options, lifestyle modifications, and/or safety issues for the diagnoses above.    10/14 Review of Systems completed and is negative except as stated above in HPI (Systems reviewed: Const, Eyes, ENT, Resp, CV, GI, , MSK, Skin, Neuro)    I reviewed previous labs for a total of 5 minutes on 04/19/2022. This is directly related to the face-to-face encounter. Review of previous labs was performed all negative except as stated above in HPI    I reviewed previous diagnostic testing for a total of 5 minutes on 04/19/2022. This is directly related to the face-to-face encounter. A review of previous diagnostic testing was performed was noted to be within normal limits except as is stated above in HPI    I performed a neurobehavioral status examination that included a clinical assessment of thinking, reasoning, and judgment. Please see above HPI and ROS for full details. This exam was performed on 04/19/2022 and included 13 minutes spent on direct face-to-face clinical observation and interview  with the patient and 19 minutes spent interpreting test results and preparing the report. The total time of 32 minutes spent on the neurobehavioral status examination is not included in the time spent on evaluation and management coding.        Total Billing time spent on encounter/documentation for this patient's evaluation and management, not including the neurobehavioral status examination: 42 minutes.

## 2022-04-20 ENCOUNTER — PATIENT MESSAGE (OUTPATIENT)
Dept: NEUROLOGY | Facility: CLINIC | Age: 67
End: 2022-04-20
Payer: MEDICARE

## 2022-04-20 PROBLEM — R41.89 SUBJECTIVE COGNITIVE IMPAIRMENT: Status: ACTIVE | Noted: 2022-04-20

## 2022-04-21 DIAGNOSIS — R46.89 COGNITIVE AND BEHAVIORAL CHANGES: Primary | ICD-10-CM

## 2022-04-21 DIAGNOSIS — R41.89 COGNITIVE AND BEHAVIORAL CHANGES: Primary | ICD-10-CM

## 2022-04-25 DIAGNOSIS — R41.9 NEUROCOGNITIVE DISORDER: Primary | ICD-10-CM

## 2022-04-26 ENCOUNTER — PATIENT MESSAGE (OUTPATIENT)
Dept: NEUROLOGY | Facility: CLINIC | Age: 67
End: 2022-04-26
Payer: MEDICARE

## 2022-04-27 ENCOUNTER — PATIENT MESSAGE (OUTPATIENT)
Dept: NEUROLOGY | Facility: CLINIC | Age: 67
End: 2022-04-27
Payer: MEDICARE

## 2022-05-01 ENCOUNTER — PATIENT MESSAGE (OUTPATIENT)
Dept: NEUROLOGY | Facility: CLINIC | Age: 67
End: 2022-05-01
Payer: MEDICARE

## 2022-05-03 ENCOUNTER — TELEPHONE (OUTPATIENT)
Dept: NEUROLOGY | Facility: CLINIC | Age: 67
End: 2022-05-03
Payer: MEDICARE

## 2022-05-03 DIAGNOSIS — R41.9 NEUROCOGNITIVE DISORDER: Primary | ICD-10-CM

## 2022-05-07 ENCOUNTER — PATIENT MESSAGE (OUTPATIENT)
Dept: NEUROLOGY | Facility: CLINIC | Age: 67
End: 2022-05-07
Payer: MEDICARE

## 2022-05-07 DIAGNOSIS — R41.9 NEUROCOGNITIVE DISORDER: Primary | ICD-10-CM

## 2022-05-10 ENCOUNTER — PATIENT MESSAGE (OUTPATIENT)
Dept: NEUROLOGY | Facility: CLINIC | Age: 67
End: 2022-05-10
Payer: MEDICARE

## 2022-05-11 NOTE — TELEPHONE ENCOUNTER
Placed phone call to [facility] who reported that patient's address on file is for the [facility] building, not her home address and that may be why I could not submit a PA on [email or web address].  They transferred me to [employer] and I was informed that I had to go through her Medicare Part D plan and was transferred to that department.  I submitted a verbal PA which was approved.  Approval number [id number] with dates of 4.11.22 through 5.11.23.  Patient notified via [facility].

## 2022-05-25 ENCOUNTER — TELEPHONE (OUTPATIENT)
Dept: NEUROLOGY | Facility: CLINIC | Age: 67
End: 2022-05-25
Payer: MEDICARE

## 2022-05-25 NOTE — TELEPHONE ENCOUNTER
Called and spoke with patient and informed her that her LP needed to be rescheduled with someone in Radiology, Provided patient with number to get appointment rescheduled.

## 2022-05-25 NOTE — TELEPHONE ENCOUNTER
----- Message from Rell Osorio sent at 5/25/2022  2:16 PM CDT -----  Regarding: Pt appt  Contact: Nguyen @ [phone]  Pt is calling to speak to someone to have appt on 6/13 r/s'd, due to spouse having another appt. Pt is asking to schedule on Wednesday 6/15/22. Please call.

## 2022-06-05 ENCOUNTER — PATIENT MESSAGE (OUTPATIENT)
Dept: NEUROLOGY | Facility: CLINIC | Age: 67
End: 2022-06-05
Payer: MEDICARE

## 2022-06-15 ENCOUNTER — PATIENT MESSAGE (OUTPATIENT)
Dept: NEUROLOGY | Facility: CLINIC | Age: 67
End: 2022-06-15
Payer: MEDICARE

## 2022-06-15 ENCOUNTER — HOSPITAL ENCOUNTER (OUTPATIENT)
Dept: RADIOLOGY | Facility: HOSPITAL | Age: 67
Discharge: HOME | End: 2022-06-15
Attending: PSYCHIATRY & NEUROLOGY
Payer: MEDICARE

## 2022-06-15 DIAGNOSIS — R41.89 COGNITIVE AND BEHAVIORAL CHANGES: ICD-10-CM

## 2022-06-15 DIAGNOSIS — G47.00 INSOMNIA, UNSPECIFIED TYPE: Primary | ICD-10-CM

## 2022-06-15 DIAGNOSIS — R46.89 COGNITIVE AND BEHAVIORAL CHANGES: ICD-10-CM

## 2022-06-15 DIAGNOSIS — R41.89 SUBJECTIVE COGNITIVE IMPAIRMENT: ICD-10-CM

## 2022-06-15 DIAGNOSIS — R41.9 NEUROCOGNITIVE DISORDER: ICD-10-CM

## 2022-06-15 DIAGNOSIS — R41.89 SUBJECTIVE COGNITIVE IMPAIRMENT: Primary | ICD-10-CM

## 2022-06-15 LAB
CLARITY CSF: CLEAR
COLOR CSF: COLORLESS
GLUCOSE CSF-MCNC: 57 MG/DL (ref 40–70)
LYMPHOCYTES NFR CSF MANUAL: 76 % (ref 40–80)
MONOS+MACROS NFR CSF MANUAL: 18 % (ref 15–45)
NEUTROPHILS NFR CSF MANUAL: 6 % (ref 0–6)
PROT CSF-MCNC: 52 MG/DL (ref 15–40)
RBC # CSF: 239 /CU MM
SPECIMEN VOL CSF: 4 ML
WBC # CSF: 1 /CU MM (ref 0–5)

## 2022-06-15 PROCEDURE — 82945 GLUCOSE OTHER FLUID: CPT | Performed by: PSYCHIATRY & NEUROLOGY

## 2022-06-15 PROCEDURE — 86592 SYPHILIS TEST NON-TREP QUAL: CPT | Performed by: PSYCHIATRY & NEUROLOGY

## 2022-06-15 PROCEDURE — 86341 ISLET CELL ANTIBODY: CPT

## 2022-06-15 PROCEDURE — 30000890 HC MISC. SEND OUT TEST

## 2022-06-15 PROCEDURE — 89051 BODY FLUID CELL COUNT: CPT | Performed by: PSYCHIATRY & NEUROLOGY

## 2022-06-15 PROCEDURE — 83883 ASSAY NEPHELOMETRY NOT SPEC: CPT | Performed by: PSYCHIATRY & NEUROLOGY

## 2022-06-15 PROCEDURE — 62328 DX LMBR SPI PNXR W/FLUOR/CT: CPT | Mod: ,,, | Performed by: RADIOLOGY

## 2022-06-15 PROCEDURE — 30000890 MAYO MISCELLANEOUS TEST (REFLEX): Mod: 59 | Performed by: PSYCHIATRY & NEUROLOGY

## 2022-06-15 PROCEDURE — 82042 OTHER SOURCE ALBUMIN QUAN EA: CPT | Performed by: PSYCHIATRY & NEUROLOGY

## 2022-06-15 PROCEDURE — 86255 FLUORESCENT ANTIBODY SCREEN: CPT | Mod: 59 | Performed by: PSYCHIATRY & NEUROLOGY

## 2022-06-15 PROCEDURE — 62328 DX LMBR SPI PNXR W/FLUOR/CT: CPT

## 2022-06-15 PROCEDURE — 83520 IMMUNOASSAY QUANT NOS NONAB: CPT | Performed by: PSYCHIATRY & NEUROLOGY

## 2022-06-15 PROCEDURE — 83873 ASSAY OF CSF PROTEIN: CPT | Performed by: PSYCHIATRY & NEUROLOGY

## 2022-06-15 PROCEDURE — 86255 FLUORESCENT ANTIBODY SCREEN: CPT | Mod: 91 | Performed by: PSYCHIATRY & NEUROLOGY

## 2022-06-15 PROCEDURE — 84157 ASSAY OF PROTEIN OTHER: CPT | Performed by: PSYCHIATRY & NEUROLOGY

## 2022-06-15 PROCEDURE — 62328 FL LUMBAR PUNCTURE DIAGNOSTIC WITH IMAGING: ICD-10-PCS | Mod: ,,, | Performed by: RADIOLOGY

## 2022-06-15 NOTE — PROCEDURES
Radiology Post-Procedure Note    Pre Op Diagnosis: progressive cognitive impariment    Post Op Diagnosis: Same    Procedure: lumbar puncture    Procedure performed by: Amaury Yusuf MD    Written Informed Consent Obtained: Yes    Specimen Removed: 19 mL CSF    Estimated Blood Loss: Minimal    Findings: Following written informed consent and sterile prep and drape, a 22 gauge spinal needle was inserted at L4 - L5 intralaminar space under fluoroscopic surveillance.  Opening pressure was 12 mm H2O. 19 mL clear CSF removed and sent to the lab for further analysis.  There were no complications.    Patient tolerated procedure well.    Amaury Yusuf MD PGY2  Department of Radiology  [facility]

## 2022-06-15 NOTE — H&P
Radiology History & Physical      SUBJECTIVE:     Chief Complaint: progressive cognitive impairment    History of Present Illness:  Nguyen Ivan is a 67 y.o. female who presents for fluoroscopic-guided lumbar puncture.     Past Medical History:   Diagnosis Date    Breast cancer 12/2017    Birads 4  Bilateral Mastectomy     Depression     Family history of breast cancer in sister     age 45,lumpectomy,XRT    H/O abnormal mammogram 10/30/2017    Birads 4    Mitral valve prolapse      Past Surgical History:   Procedure Laterality Date    BILATERAL MASTECTOMY Bilateral 12/2017    BREAST LUMPECTOMY  2017    atypical ductal hyperplasia     BREAST RECONSTRUCTION Bilateral 3/2018 & 5/2018    CHOLECYSTECTOMY  2005    COLONOSCOPY  2011    DILATION AND CURETTAGE OF UTERUS  1983    Missed Ab    EYE SURGERY  2017    cataracts    HERNIA REPAIR  2005    LIVER LOBECTOMY  2005    REDUCTION OF BOTH BREASTS  2009    TONSILLECTOMY  08/1970    TUBAL LIGATION  1985    with last delivery     UMBILICAL HERNIA REPAIR  2006       Home Meds:   Prior to Admission medications    Medication Sig Start Date End Date Taking? Authorizing Provider   acetaminophen (TYLENOL) 650 MG TbSR  7/1/21   Historical Provider   ALPRAZolam (XANAX) 0.25 MG tablet Take 1 tablet (0.25 mg total) by mouth every evening. 3/24/22   Teresa Biswas MD   b complex vitamins (B-COMPLEX) tablet Take 1 tablet by mouth once daily. 4/19/22   Suraj Obrien MD   D3-red wine-resveratrol-malt 5,000-200 unit-mg Cap Take 1 tablet by mouth once daily at 6am. 4/19/22   Suraj Obrien MD   letrozole (FEMARA) 2.5 mg Tab TK 1 T PO D 1/25/19   Historical Provider   losartan (COZAAR) 25 MG tablet Take 1 tablet (25 mg total) by mouth once daily. 11/8/21 11/8/22  Teresa Biswas MD   suvorexant (BELSOMRA) 10 mg Tab Take 1 tablet by mouth every evening. 4/19/22   Suraj Obrien MD   venlafaxine (EFFEXOR-XR) 75 MG 24 hr capsule TAKE 2 CAPSULES BY MOUTH EVERY DAY 9/9/21   Lydia  MD Amy     Anticoagulants/Antiplatelets: no anticoagulation    Allergies: Review of patient's allergies indicates:  No Known Allergies  Sedation History:  no adverse reactions    Review of Systems:   Hematological: no known coagulopathies  Respiratory: no shortness of breath  Cardiovascular: no chest pain  Gastrointestinal: no abdominal pain  Genito-Urinary: no dysuria  Musculoskeletal: negative  Neurological: no TIA or stroke symptoms         OBJECTIVE:     Vital Signs (Most Recent)       Physical Exam:  ASA: 2  Mallampati: 2    General: no acute distress  Mental Status: alert and oriented to person, place and time  HEENT: normocephalic, atraumatic  Chest: unlabored breathing  Heart: regular heart rate  Abdomen: nondistended  Extremity: moves all extremities    Laboratory  No results found for: INR    Lab Results   Component Value Date    WBC 7.55 12/16/2021    HGB 14.0 12/16/2021    HCT 43.6 12/16/2021    MCV 97 12/16/2021     12/16/2021      Lab Results   Component Value Date    GLU 93 12/16/2021     12/16/2021    K 4.0 12/16/2021     12/16/2021    CO2 27 12/16/2021    BUN 15 12/16/2021    CREATININE 0.7 12/16/2021    CALCIUM 9.6 12/16/2021    ALT 21 12/16/2021    AST 19 12/16/2021    ALBUMIN 4.5 12/16/2021    BILITOT 0.6 12/16/2021       ASSESSMENT/PLAN:     Sedation Plan: local  Patient will undergo fluoroscopic-guided lumbar puncture.    Amaury Yusuf MD PGY2  Department of Radiology  [facility]

## 2022-06-16 LAB — MAYO MISCELLANEOUS RESULT (REF): NORMAL

## 2022-06-16 RX ORDER — SUVOREXANT 20 MG/1
0.5 TABLET, FILM COATED ORAL NIGHTLY
Qty: 10 TABLET | Refills: 0 | Status: SHIPPED | OUTPATIENT
Start: 2022-06-16 | End: 2022-07-01

## 2022-06-17 LAB
MAYO MISCELLANEOUS RESULT (REF): NORMAL
NSE CSF-MCNC: 19 NG/ML
VDRL CSF QL: NEGATIVE

## 2022-06-20 ENCOUNTER — PATIENT MESSAGE (OUTPATIENT)
Dept: NEUROLOGY | Facility: CLINIC | Age: 67
End: 2022-06-20
Payer: MEDICARE

## 2022-06-20 LAB
AMPHIPHYSIN AB TITR CSF: NEGATIVE TITER
CV2 IGG TITR CSF: NEGATIVE TITER
GLIAL NUC TYPE 1 AB TITR CSF: NEGATIVE TITER
HU1 AB TITR CSF IF: NEGATIVE TITER
HU2 AB TITR CSF IF: NEGATIVE TITER
HU3 AB TITR CSF: NEGATIVE TITER
KAPPA LC FREE CSF-MCNC: 0.01 MG/DL
PARANEOPLASTIC INTERPRETATION,CSF: NORMAL
PCA-2 AB TITR CSF: NEGATIVE TITER
PCA-TR AB TITR CSF: NEGATIVE TITER
PNEOE REFLEX TEST ADDED: NORMAL
PURKINJE CELLS AB TITR CSF IF: NEGATIVE TITER

## 2022-06-23 ENCOUNTER — PATIENT MESSAGE (OUTPATIENT)
Dept: NEUROLOGY | Facility: CLINIC | Age: 67
End: 2022-06-23
Payer: MEDICARE

## 2022-06-23 LAB — MAYO MISCELLANEOUS RESULT (REF): NORMAL

## 2022-06-24 LAB — MBP CSF-MCNC: <2 MCG/L (ref 2–4)

## 2022-06-28 ENCOUNTER — OFFICE VISIT (OUTPATIENT)
Dept: NEUROLOGY | Facility: CLINIC | Age: 67
End: 2022-06-28
Payer: MEDICARE

## 2022-06-28 ENCOUNTER — PATIENT MESSAGE (OUTPATIENT)
Dept: NEUROLOGY | Facility: CLINIC | Age: 67
End: 2022-06-28

## 2022-06-28 ENCOUNTER — PATIENT MESSAGE (OUTPATIENT)
Dept: INTERNAL MEDICINE | Facility: CLINIC | Age: 67
End: 2022-06-28
Payer: MEDICARE

## 2022-06-28 DIAGNOSIS — F51.05 INSOMNIA DUE TO OTHER MENTAL DISORDER (CODE): ICD-10-CM

## 2022-06-28 DIAGNOSIS — Z79.811 AROMATASE INHIBITOR USE: ICD-10-CM

## 2022-06-28 DIAGNOSIS — Z81.8 FAMILY HISTORY OF FIRST DEGREE RELATIVE WITH DEMENTIA: ICD-10-CM

## 2022-06-28 DIAGNOSIS — F41.9 ANXIETY: ICD-10-CM

## 2022-06-28 DIAGNOSIS — R41.89 SUBJECTIVE COGNITIVE IMPAIRMENT: Primary | ICD-10-CM

## 2022-06-28 DIAGNOSIS — R23.2 HOT FLASHES: ICD-10-CM

## 2022-06-28 PROCEDURE — 96116 NUBHVL XM PHYS/QHP 1ST HR: CPT | Mod: 95,59,, | Performed by: PSYCHIATRY & NEUROLOGY

## 2022-06-28 PROCEDURE — 96116 PR NEUROBEHAVIORAL STATUS EXAM BY PSYCH/PHYS: ICD-10-PCS | Mod: 95,59,, | Performed by: PSYCHIATRY & NEUROLOGY

## 2022-06-28 PROCEDURE — 99215 PR OFFICE/OUTPT VISIT, EST, LEVL V, 40-54 MIN: ICD-10-PCS | Mod: 95,25,, | Performed by: PSYCHIATRY & NEUROLOGY

## 2022-06-28 PROCEDURE — 99215 OFFICE O/P EST HI 40 MIN: CPT | Mod: 95,25,, | Performed by: PSYCHIATRY & NEUROLOGY

## 2022-06-28 NOTE — PROGRESS NOTES
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 no outward/diagonal drift.   There is no myoclonus observed in Ms. Ivan's bilateral upper and lower extremities.   There are no fasciculations observed in Ms. Ivan's bilateral upper and lower extremities.    Neuropsychological Evaluation Summary:     Prior Neurocognitive/Neuropsychological Evaluations   Summary from EMR:  o Informal Neurocognitive Assessment 8/18/21  o - MMSE 28/28  o - MOCA 27/29   2021-10-05:  o Questionable Visuospatial Impairment: visuospatial construction.  o BEHAV5+ 1/6: See ROS section for a full description   2022-04-19:  o Subjective Cognitive impairment - Questionable Visuospatial Impairment: visuospatial construction.  o BEHAV5+ 2/6: See ROS section for a full description    Neurocognitive Evaluation completed on 06/28/2022:  Neuropsychiatric/Behavioral Focused Evaluation Assessment   BEHAV5+ 2/6 See ROS section for " a full description   Laboratories:     Lab Date Value [Reference]   Neurodegenerative Cerebrospinal Fluid Assessment           Abeta42 2022, Doe-15  2022, Jun-15    888 [>1026 pg/mL]  888 [>1026 pg/mL]      p-Tau/Abeta42 2022, Jun-15    0.015 [<=0.023 ratio]      Phospho-Tau(181P) 2022, Jun-15  2022, Jun-15    13 [<=21.7 pg/mL]  13 [<=21.7 pg/mL]      Total-Tau 2022, Jun-15  2022, Doe-15    169 [<=238 pg/mL]  169 [<=238 pg/mL]      Cerebrospinal Fluid Assessment   Albumin, CSF 2022, Doe-15    24.8 [<=27.0 ]      Glucose, CSF 2022, Jun-15    57 [40 - 70 mg/dL]      [place] Free Light Chain, CSF 2022, Doe-15    0.0108      Neuron Specific Enolase, CSF 2022, Doe-15    19 (H) [ng/mL]      Protein, CSF 2022, Jun-15    52 (H) [15 - 40 mg/dL]      VDRL, CSF 2022, Doe-15    Negative      Appearance, CSF 2022, Doe-15    Clear [Clear]      COLOR CSF 2022, Doe-15    Colorless [Colorless]      Lymphs, CSF 2022, Doe-15    76 [40 - 80 %]      Mono/Macrophage, CSF 2022, Jun-15    18 [15 - 45 %]      RBC, CSF 2022, Doe-15    239 (A) [0 /cu mm]      Segmented Neutrophils, CSF 2022, Doe-15    6 [0 - 6 %]      WBC, CSF 2022, Doe-15    1 [0 - 5 /cu mm]      Coagulopathy Screening   aPTT 08/18/2021  22.8 [21.0 - 32.0 sec]  22.8 [21.0 - 32.0 sec]      Metabolic Screening   Homocysteine 08/18/2021  9.5 [4.0 - 15.5 umol/L]  9.5 [4.0 - 15.5 umol/L]  9.5 [4.0 - 15.5 umol/L]      Methlymalonic Acid 08/18/2021  0.14  0.14  0.14      T4 Total 08/18/2021  8.2 [4.5 - 11.5 ug/dL]  8.2 [4.5 - 11.5 ug/dL]      TSH 11/13/2020  2.158 [0.400 - 4.000 uIU/mL]  2.158 [0.40 - 4.00 uIU/mL]  0.652 [0.400 - 4.000 uIU/mL]      Glucose 2021, Dec-16    93 [70 - 110 mg/dL]      Albumin 2021, Dec-16  2021, Dec-16  2020, Nov-13    4.5 [3.5 - 5.2 g/dL]  4.5 [3.5 - 5.2 g/dL]  4.3 [3.5 - 5.2 g/dL]      Alkaline Phosphatase 2021, Dec-16  2021, Dec-16  2020, Nov-13    46 (L) [55 - 135 U/L]  46 (L) [55 - 135 U/L]  62 [55 - 135 U/L]      ALT 2021, Dec-16  2021,  Dec-16  2020, Nov-13    21 [10 - 44 U/L]  21 [10 - 44 U/L]  25 [10 - 44 U/L]      AST 2021, Dec-16  2021, Dec-16  2020, Nov-13    19 [10 - 40 U/L]  19 [10 - 40 U/L]  20 [10 - 40 U/L]      BILIRUBIN TOTAL 2021, Dec-16  2021, Dec-16  2020, Nov-13    0.6 [0.1 - 1.0 mg/dL]  0.6 [0.1 - 1.0 mg/dL]  0.8 [0.1 - 1.0 mg/dL]      PROTEIN TOTAL 2021, Dec-16  2021, Dec-16  2020, Nov-13    7.4 [6.0 - 8.4 g/dL]  7.4 [6.0 - 8.4 g/dL]  7.2 [6.0 - 8.4 g/dL]      Cholesterol 2020, Nov-13    225 (H) [120 - 199 mg/dL]      HDL 2020, Nov-13    58 [40 - 75 mg/dL]      Non-HDL Cholesterol 2020, Nov-13    167 [mg/dL]      Triglycerides 08/18/2021  145 [30 - 150 mg/dL]      Folate 08/18/74101731, Aug-18    19.1 [4.0 - 24.0 ng/mL]  19.1 [4.0 - 24.0 ng/mL]  19.1 [4.0 - 24.0 ng/mL]      Vit D, 25-Hydroxy 08/18/2021  41 [30 - 96 ng/mL]  41 [30 - 96 ng/mL]      Vitamin B-12 08/18/20212021, Aug-18    745 [180 - 914 ng/L]  745 [180 - 914 ng/L]  745 [180 - 914 ng/L]      Autoimmune/Paraneoplastic Screening   PNEOE AGNA-1, Csf 2022, Doe-15    Negative      PNEOE YUSRA-1, Csf 2022, Jun-15    Negative      PNEOE YUSRA-2, Csf 2022, Jun-15    Negative      PNEOE YUSRA-3, Csf 2022, Doe-15    Negative      PNEOE, CRMP-5-IgG, Csf 2022, Doe-15    Negative      PNEOE, Amphiphysin Ab, Csf 2022, Doe-15    Negative      PNEOE, PCA-1, Csf 2022, Doe-15    Negative      PNEOE, PCA-2, Csf 2022, Doe-15    Negative      PNEOE, PCA-Tr, Csf 2022, Doe-15    Negative      Infectious Disease/Immunocompromised Screening   SARS-CoV-2 RNA, Amplification, Qual 08/18/2021  Negative  Negative      HIV 1/2 Ag/Ab 08/18/2021  Negative  Negative  Negative      Syphilis Treponemal Ab 08/18/21115066, Aug-18    Nonreactive [Nonreactive]  Nonreactive [Nonreactive]      Standard Hematology Screen   Hematocrit 2021, Dec-16    43.6 [37.0 - 48.5 %]      Hemoglobin 2021, Dec-16    14.0 [12.0 - 16.0 g/dL]      MCV 2021, Dec-16    97 [82 - 98 fL]      Platelets 2021, Dec-16    329 [150 - 450  K/uL]      Neuroendocrine/Electrolyte Screening   BUN 2021, Dec-16    15 [8 - 23 mg/dL]      Chloride 2021, Dec-16    102 [95 - 110 mmol/L]      Creatinine 2021, Dec-16    0.7 [0.5 - 1.4 mg/dL]      Potassium 2021, Dec-16    4.0 [3.5 - 5.1 mmol/L]      Sodium 2021, Dec-16    139 [136 - 145 mmol/L]      Delirium Screening   Glucose, UA 2021, Dec-16    Negative      Ketones, UA 2021, Dec-16    Negative      Leukocytes, UA 2021, Dec-16    Negative      NITRITE UA 2021, Dec-16    Negative      Protein, UA 2021, Dec-16    Negative           Neuroimaging:    MRI brain/head without contrast on 9/6/2012   Formal interpretation by Radiology:   not available   Independently reviewed radiological imaging by Suraj Ravi MD. MPH. Behavioral Neurologist   T1: No significant cortical atrophy with age-appropriate changes. Subcortical nuclei and hippocampi are without significant atrophy.   T2/FLAIR: No Significant hyperintensities appreciated on MRI T2/FLAIR   DWI/ADC: No Significant DWI hyperintensities/hypointensities. No ADC correlation.   SWI/GRE: No Significant hypointensities to suggest cortical/subcortical hemosiderin deposition.   Impression: : Normal Brain Imaging.    CT brain/head without contrast on 01/01/2019   Formal interpretation by Radiology:   not available   Independently reviewed radiological imaging by Suraj Ravi MD. MPH. Behavioral Neurologist   Impression: : No significant cortical atrophy with age-appropriate changes. Subcortical nuclei and hippocampi are without significant atrophy.    MRI brain/head without contrast on 10/04/2021   Technique: Multiplanar multisequence MR imaging of the brain was performed without intravenous contrast.   Formal interpretation by Radiology:   No evidence of acute or chronic intracranial pathology.   Independently reviewed radiological imaging by Suraj Ravi MD. MPH. Behavioral Neurologist   T1: No significant cortical atrophy with  age-appropriate changes. Subcortical nuclei and hippocampi are without significant atrophy.   T2/FLAIR: No Significant hyperintensities appreciated on MRI T2/FLAIR   DWI/ADC: No Significant DWI hyperintensities/hypointensities. No ADC correlation.   SWI/GRE: No Significant hypointensities to suggest cortical/subcortical hemosiderin deposition.   Impression: : Normal Brain Imaging.     Procedures:    Electrocardiogram on 12/16/2021   Formal interpretation:   Vent. Rate : 067 BPM     Atrial Rate : 067 BPM    P-R Int : 148 ms          QRS Dur : 084 ms     QT Int : 398 ms       P-R-T Axes : 061 043 060 degrees    QTc Int : 420 ms Normal sinus rhythm Possible Left atrial enlargement Nonspecific ST and/or T wave abnormalities Abnormal ECG   Independently reviewed Electrocardiogram by Suraj Ravi MD. MPH. Behavioral Neurologist   Impression: : Received ECG has no evidence of sinus node disease. HR (>=50-60). Prolonged MI interval (>0.22 s). Broad QRS complex (> 0.12 s).     Clinical Summary:     Ms. Ivan is a 67-year-old right-handed female with a relevant past medical history of Breast CA s/p B mastectomy on letrozole, Depression/Anxiety, who presents reporting a 2-year history of gradually progressive neurocognitive impairment.       The clinical history is suggestive of:   Executive Impairment: Working Memory   Language Impairment: Energization   Psychiatric Impairment: Neurovegetative, Social Coherence, Signal-Noise Dysregulation, Mood Regulation, Stimulation Dysregulation  The neurological examination is significant for:   Normal Neurological Examination  Informal neuropsychology battery is positive (based on age and education) for:   Subjective Cognitive impairment - Questionable Visuospatial Impairment: visuospatial construction.   BEHAV5+ 2/6: See ROS section for a full description  Neurological imaging   MRI brain/head without contrast (9/6/2012): Normal Brain Imaging.   CT brain/head  without contrast (01/01/2019): No significant cortical atrophy with age-appropriate changes. Subcortical nuclei and hippocampi are without significant atrophy.   MRI brain/head without contrast (10/04/2021): Normal Brain Imaging.        Assessment:        Ms. Ivan's clinical presentation is amnestic predominant subjective cognitive impairment (CDR-SOB: 0.5 - Questionable cognitive impairment).     The pathology underlying Ms. Leroys cognitive impairment is unclear at this time. The neurobehavioral effects of aromatase inhibitors (AI) likely depend upon when during the lifespan such drugs are administered. Post-menopausal women treated with such drugs are more likely to show memory deficits, increased pain response, and other emotional responses. That said, while great effort has been invested in studying cognitive dysfunction and AI therapy, controlling confounders such as prior chemotherapy received and having breast cancer is necessary to fully determine whether any association exists. Cerebrospinal Fluid Neurodegenerative Biomarkers (06/15/2022): Phospho-Tau(181P): 13 ( 7 pg/mL)  Total-Tau: 169 (1026 pg/mL)  p-Tau/Abeta42: 0. 015 ( 023 ratio)   Shows no active neuro degeneration however the there is a lower than expected abeta42 potentially suggesting early stages of amyloid deposition and prodromal ADRP.      The observations been above discussed the patient. Patient's lumbar puncture with cerebrospinal fluid testing is largely benign with no evidence of active neuro degeneration. There is however depressed ABETA42  protein levels potentially suggesting early stages/ prodrome all stages of Alzheimer's disease pathology. We discussed this observation in context the patient's insomnia and aromatase in therapy. Patient's insomnia has largely disappeared in the setting of Belsomra 20 mg treatment. The patient is no longer having insomnia has no evidence of sleep apnea. We discussed long-term health  management including exercise and dietary changes such as the MIND diet. We discuss her ongoing postmenopausal brain fog and hot flashes. We discuss her symptoms in context for ongoing aromatase inhibitor therapy. We encourage the patient to be compliant with ongoing medical treatment for her estrogen receptor positive stage I breast cancer however do recommend the patient only continue as long as not medically deemed necessary due to its strong probability of influence her current neurocognitive deficits. Recommend discussing with her over to a man regarding the long-term use of venlafaxine. Given patient's strong her family history of early and late onset dementia the patient still is a candidate for free genetic testing through [facility]. We discussed this potential with the patient. The patient wish to discuss with her family before pursuing.        Care Management Plan:     #Neurocognitive/Behavioral Disorder Treatment:   No indication for memantine at this time   Start MIND diet   Recommend minimizing long-term exposure to AI as much as possible   Continue Belsomra 20mg qHS   Continue B-complex multivitamin   Continue D3/resveratrol   We have discussed opportunities for further testing with [facility] genetic testing - after patient discusses with family will set up  #Behavioral/Environmental Treatment   We recommend engaging in activities that stimulate cognitively and socially while avoiding excessive stimulation and fatigue in overwhelmingly complex situations.   We recommend integrating routine and schedule into your daily life. https://www.alzheimersproject.org/news/the-importance-of-routine-and-familiarity-to-persons-with-dementia/  #Health Maintenance/Lifestyle Advice   We have discussed the value in aggressively controlling vascular risk factors like hypertension, hyperlipidemia, and Diabetes SBP<130, LDL<100, A1C<7.0.   We discussed the need to optimize lifestyle choices including a  heart-healthy diet (e.g., Mediterranean or DASH), increased cardiovascular exercise (goal 150 minutes of moderate-intensity per week), and stay cognitively and socially active.  #Support   We all need support sometimes. Get easy access to local resources, community programs, and services. https://www.communityresourcefinder.org/   Learn more about Cognitive Impairment in [place]: https://www.alz.org/professionals/public-health/state-overview[place]  #Follow up:   Follow-up as needed.    Thank you for allowing us to participate in the care of your patient. Please do not hesitate to contact us with any questions or concerns.     It was a pleasure seeing Ms. Ivan and we look forward to seeing them at their follow-up visit.     This note is dictated on M*Modal Fluency Direct word recognition program. There are word recognition mistakes that are occasionally missed on review.      Scheduled Follow-up :  No future appointments.    After Visit Medication List :     Medication List          Accurate as of June 28, 2022 12:17 PM. If you have any questions, ask your nurse or doctor.            CONTINUE taking these medications    acetaminophen 650 MG Tbsr  Commonly known as: TYLENOL     ALPRAZolam 0.25 MG tablet  Commonly known as: XANAX  Take 1 tablet (0.25 mg total) by mouth every evening.     b complex vitamins tablet  Commonly known as: B-COMPLEX  Take 1 tablet by mouth once daily.     BELSOMRA 20 mg Tab  Generic drug: suvorexant  Take 0.5 tablets by mouth every evening.     D3-red wine-resveratrol-malt 5,000-200 unit-mg Cap  Take 1 tablet by mouth once daily at 6am.     letrozole 2.5 mg Tab  Commonly known as: FEMARA     losartan 25 MG tablet  Commonly known as: COZAAR  Take 1 tablet (25 mg total) by mouth once daily.     venlafaxine 75 MG 24 hr capsule  Commonly known as: EFFEXOR-XR  TAKE 2 CAPSULES BY MOUTH EVERY DAY            Signing Physician:  Suraj Obrien  MD    Billing:    -----------------------------------------------------------------------------    I performed this consultation using real-time Telehealth tools, including a live video connection between my location and the patient's location. Prior to initiating the consultation, I obtained informed verbal consent to perform this consultation using Telehealth tools and answered all the questions about the Telehealth interaction. The participants understand that only a limited neurological exam and limited neuropsychological testing can be performed using Telehealth tools.    I spent a total of 45 minutes (time-in: 11:00 AM; time-out: 11:45 AM) on 06/28/2022, in-person face-to-face with the patient and caregiver(s), >50% of that time was spent counseling regarding the symptoms, treatment plan, risks, therapeutic options, lifestyle modifications, and/or safety issues for the diagnoses above.    10/14 Review of Systems completed and is negative except as stated above in HPI (Systems reviewed: Const, Eyes, ENT, Resp, CV, GI, , MSK, Skin, Neuro)    I reviewed previous labs for a total of 15 minutes on 06/28/2022. This is directly related to the face-to-face encounter. Review of previous labs was performed all negative except as stated above in HPI    I performed a neurobehavioral status examination that included a clinical assessment of thinking, reasoning, and judgment. Please see above HPI and ROS for full details. This exam was performed on 06/28/2022 and included 15 minutes spent on direct face-to-face clinical observation and interview with the patient and 16 minutes spent interpreting test results and preparing the report. The total time of 31 minutes spent on the neurobehavioral status examination is not included in the time spent on evaluation and management coding.    Total Billing time spent on encounter/documentation for this patient's evaluation and management, not including the neurobehavioral status  examination: 45 minutes.

## 2022-06-29 ENCOUNTER — PATIENT MESSAGE (OUTPATIENT)
Dept: NEUROLOGY | Facility: CLINIC | Age: 67
End: 2022-06-29
Payer: MEDICARE

## 2022-06-29 ENCOUNTER — PATIENT MESSAGE (OUTPATIENT)
Dept: INTERNAL MEDICINE | Facility: CLINIC | Age: 67
End: 2022-06-29
Payer: MEDICARE

## 2022-07-07 NOTE — TELEPHONE ENCOUNTER
REQUISITION  [id number]  ORDER DATE  2022  TEST TYPE  Diagnostic  PATIENT  Nguyen Ivan    1955     Estimated Report Date:  Pending   Order submitted  ORDER SUBMITTED  2022  SPECIMEN RECEIVED  LAB PROCESSING  ANALYSIS AND INTERPRETATION  REPORT RELEASED

## 2022-07-27 ENCOUNTER — PATIENT MESSAGE (OUTPATIENT)
Dept: INTERNAL MEDICINE | Facility: CLINIC | Age: 67
End: 2022-07-27
Payer: MEDICARE

## 2022-07-27 ENCOUNTER — OFFICE VISIT (OUTPATIENT)
Dept: CARDIOLOGY | Facility: CLINIC | Age: 67
End: 2022-07-27
Payer: MEDICARE

## 2022-07-27 ENCOUNTER — PATIENT MESSAGE (OUTPATIENT)
Dept: NEUROLOGY | Facility: CLINIC | Age: 67
End: 2022-07-27
Payer: MEDICARE

## 2022-07-27 DIAGNOSIS — E78.49 OTHER HYPERLIPIDEMIA: Primary | ICD-10-CM

## 2022-07-27 DIAGNOSIS — I10 PRIMARY HYPERTENSION: ICD-10-CM

## 2022-07-27 DIAGNOSIS — Z01.419 WELL FEMALE EXAM WITH ROUTINE GYNECOLOGICAL EXAM: ICD-10-CM

## 2022-07-27 PROCEDURE — 99214 PR OFFICE/OUTPT VISIT, EST, LEVL IV, 30-39 MIN: ICD-10-PCS | Mod: 95,,, | Performed by: INTERNAL MEDICINE

## 2022-07-27 PROCEDURE — 99214 OFFICE O/P EST MOD 30 MIN: CPT | Mod: 95,,, | Performed by: INTERNAL MEDICINE

## 2022-07-27 RX ORDER — ROSUVASTATIN CALCIUM 20 MG/1
20 TABLET, COATED ORAL DAILY
Qty: 90 TABLET | Refills: 3 | Status: SHIPPED | OUTPATIENT
Start: 2022-07-27 | End: 2023-02-07 | Stop reason: SDUPTHER

## 2022-07-27 NOTE — ASSESSMENT & PLAN NOTE
Tolerating rosuvastatin 10 x 1, will increase to 20 x 1.  Pre statin LDL is 138, can follow-up with next set of labs.

## 2022-07-27 NOTE — PROGRESS NOTES
HPI:  This is a 67-year-old female with a history of breast cancer status post bilateral mastectomy 2017, hepatic lobectomy for benign mass '05, mitral valve prolapse, and hypertension presenting for follow-up.    The patient was initially evaluated by me in late 2021 prior to anticipated back surgery.  She tolerated this without issue and has done well with a marked improvement in her back pain.    She is very active in her life without any CV limitation. She is limited by her DDD. She completes ADLs and is active in her life. She has 18 grandchildren aged 1-17 yo and at any given time maybe taking care of a baby and watching upto 4 children under the age of 5. She has no issues keeping up with them.     She does have a h/o MVP. When she was younger she had a chronic chest pain syndrome with multiple TTE and stress tests at an OSH system that were unremarkable. She has had no issues with any of these symptoms in the last 2 decades.    The patient denies any symptoms of chest pain, shortness of breath, or dyspnea on exertion. The patient denies any previous history of myocardial infarction, coronary artery disease, peripheral arterial disease, stroke, congestive heart failure, or cardiomyopathy. Nonsmoker and no FH of premature CAD.    At last visit we initiated rosuvastatin 10 x 1, which she has tolerated without issue.  She is additionally on losartan 25 x 1. She does not check her blood pressures at home.    PHYSICAL EXAM:  NAD, A+Ox3  Breathing comfortably  No neuro deficits    LABS/CARDIAC TESTS:  December 2021 CBC and CMP are normal with a hemoglobin of 14 and a creatinine is 0.7.  TSH normal. 2020 LDL is 138 HDL is 58.  EKG December 2021 demonstrates sinus rhythm with no Q-waves or ST changes.  Chest x-ray December 2021 clear with normal cardiac size.  CT Chest 7/2021 @ OSH no cv abnormality  MRI Brain August 2021 Unremarkable    ASSESSMENT & PLAN:    Other hyperlipidemia  Tolerating rosuvastatin 10 x 1,  will increase to 20 x 1.  Pre statin LDL is 138, can follow-up with next set of labs.    Primary hypertension  Patient is on losartan 25 x 1. She will continue to keep an eye on her blood pressure and start checking intermittently at home.        Other hyperlipidemia    Primary hypertension    Other orders  -     rosuvastatin (CRESTOR) 20 MG tablet; Take 1 tablet (20 mg total) by mouth once daily.  Dispense: 90 tablet; Refill: 3        Godwin Alexandre MD

## 2022-08-31 DIAGNOSIS — Z78.0 MENOPAUSE: ICD-10-CM

## 2022-09-12 DIAGNOSIS — G47.00 INSOMNIA, UNSPECIFIED TYPE: ICD-10-CM

## 2022-09-12 RX ORDER — SUVOREXANT 20 MG/1
0.5 TABLET, FILM COATED ORAL NIGHTLY
Qty: 30 TABLET | Refills: 2 | Status: SHIPPED | OUTPATIENT
Start: 2022-09-12 | End: 2022-10-04

## 2022-09-13 ENCOUNTER — TELEPHONE (OUTPATIENT)
Dept: NEUROLOGY | Facility: CLINIC | Age: 67
End: 2022-09-13
Payer: MEDICARE

## 2022-09-13 DIAGNOSIS — G47.00 INSOMNIA, UNSPECIFIED TYPE: ICD-10-CM

## 2022-09-13 NOTE — TELEPHONE ENCOUNTER
----- Message from Maricruz Duong MA sent at 9/12/2022  1:03 PM CDT -----  Regarding: FW: RX refill  Contact: PT @ [phone]    ----- Message -----  From: Rell Osorio  Sent: 9/12/2022  12:19 PM CDT  To: Cam[facility]ARROW Staff  Subject: RX refill                                        Rx Refill/Request    Is this a Refill or New Rx: refill    Rx Name and Strength: BELSOMRA 20 mg Tab    Preferred Pharmacy with phone number:     [id number] [address] AT [place] [address]  [address] [place] [id number]  Phone: [phone] Fax: [phone]     Communication Preference: PT @ [phone]    Additional Information: Pt is in [place] due to her daughter being in an accident. Please send rx to pharmacy enclosed. Thanks.

## 2022-09-14 ENCOUNTER — PATIENT MESSAGE (OUTPATIENT)
Dept: NEUROLOGY | Facility: CLINIC | Age: 67
End: 2022-09-14
Payer: MEDICARE

## 2022-09-14 ENCOUNTER — TELEPHONE (OUTPATIENT)
Dept: NEUROLOGY | Facility: CLINIC | Age: 67
End: 2022-09-14
Payer: MEDICARE

## 2022-09-14 NOTE — TELEPHONE ENCOUNTER
----- Message from Sophie Sargent sent at 9/14/2022  9:07 AM CDT -----  Pt need a new rx cannot be transferred because its a controlled substance             Vicenta Love   Phone [phone]  Fax [phone]  [facility] [id number]   [address] [address]

## 2022-10-06 ENCOUNTER — PATIENT MESSAGE (OUTPATIENT)
Dept: INTERNAL MEDICINE | Facility: CLINIC | Age: 67
End: 2022-10-06
Payer: MEDICARE

## 2022-10-09 ENCOUNTER — PATIENT MESSAGE (OUTPATIENT)
Dept: INTERNAL MEDICINE | Facility: CLINIC | Age: 67
End: 2022-10-09
Payer: MEDICARE

## 2022-10-11 ENCOUNTER — PATIENT MESSAGE (OUTPATIENT)
Dept: INTERNAL MEDICINE | Facility: CLINIC | Age: 67
End: 2022-10-11
Payer: MEDICARE

## 2022-12-03 ENCOUNTER — PATIENT MESSAGE (OUTPATIENT)
Dept: INTERNAL MEDICINE | Facility: CLINIC | Age: 67
End: 2022-12-03
Payer: MEDICARE

## 2023-01-20 ENCOUNTER — OFFICE VISIT (OUTPATIENT)
Dept: OBSTETRICS AND GYNECOLOGY | Facility: CLINIC | Age: 68
End: 2023-01-20
Attending: OBSTETRICS & GYNECOLOGY
Payer: MEDICARE

## 2023-01-20 VITALS
HEIGHT: 66 IN | DIASTOLIC BLOOD PRESSURE: 72 MMHG | BODY MASS INDEX: 25.86 KG/M2 | SYSTOLIC BLOOD PRESSURE: 108 MMHG | WEIGHT: 160.94 LBS

## 2023-01-20 DIAGNOSIS — R33.9 INCOMPLETE BLADDER EMPTYING: Primary | ICD-10-CM

## 2023-01-20 DIAGNOSIS — Z01.419 WELL FEMALE EXAM WITH ROUTINE GYNECOLOGICAL EXAM: ICD-10-CM

## 2023-01-20 PROCEDURE — G0101 CA SCREEN;PELVIC/BREAST EXAM: HCPCS | Mod: S$PBB,,, | Performed by: OBSTETRICS & GYNECOLOGY

## 2023-01-20 PROCEDURE — 87186 SC STD MICRODIL/AGAR DIL: CPT | Performed by: OBSTETRICS & GYNECOLOGY

## 2023-01-20 PROCEDURE — 99999 PR PBB SHADOW E&M-EST. PATIENT-LVL III: ICD-10-PCS | Mod: PBBFAC,,, | Performed by: OBSTETRICS & GYNECOLOGY

## 2023-01-20 PROCEDURE — 87086 URINE CULTURE/COLONY COUNT: CPT | Performed by: OBSTETRICS & GYNECOLOGY

## 2023-01-20 PROCEDURE — 99213 OFFICE O/P EST LOW 20 MIN: CPT | Mod: PBBFAC,PN,25 | Performed by: OBSTETRICS & GYNECOLOGY

## 2023-01-20 PROCEDURE — 87088 URINE BACTERIA CULTURE: CPT | Performed by: OBSTETRICS & GYNECOLOGY

## 2023-01-20 PROCEDURE — 87077 CULTURE AEROBIC IDENTIFY: CPT | Performed by: OBSTETRICS & GYNECOLOGY

## 2023-01-20 PROCEDURE — G0101 PR CA SCREEN;PELVIC/BREAST EXAM: ICD-10-PCS | Mod: S$PBB,,, | Performed by: OBSTETRICS & GYNECOLOGY

## 2023-01-20 PROCEDURE — 99999 PR PBB SHADOW E&M-EST. PATIENT-LVL III: CPT | Mod: PBBFAC,,, | Performed by: OBSTETRICS & GYNECOLOGY

## 2023-01-20 PROCEDURE — G0101 CA SCREEN;PELVIC/BREAST EXAM: HCPCS | Mod: PBBFAC,PN | Performed by: OBSTETRICS & GYNECOLOGY

## 2023-01-20 RX ORDER — VENLAFAXINE HYDROCHLORIDE 75 MG/1
150 CAPSULE, EXTENDED RELEASE ORAL DAILY
Qty: 180 CAPSULE | Refills: 3 | Status: SHIPPED | OUTPATIENT
Start: 2023-01-20 | End: 2024-01-10 | Stop reason: SDUPTHER

## 2023-01-20 RX ORDER — OXYCODONE AND ACETAMINOPHEN 5; 325 MG/1; MG/1
1 TABLET ORAL DAILY PRN
COMMUNITY
Start: 2022-12-27 | End: 2023-01-26

## 2023-01-20 NOTE — PROGRESS NOTES
Subjective:       Patient ID: Nguyen Ivan is a 67 y.o. female.    Chief Complaint:  Annual Exam (Last pap  normal/Last hpv  negative/Last dexa )        History of Present Illness  Nguyen Ivan is a 67 y.o. female  who presents for annual. Doing well after breast cancer surgery. Off Tamoxifen. Seeing oncologist. Watching an area in UOQ of left breast. Doing well on Effexor.     No LMP recorded (lmp unknown). Patient is postmenopausal.   Date of Last Pap: No result found    Review of Systems  Review of Systems   Constitutional:  Negative for chills and fever.      Objective:   Physical Exam:   Constitutional: She is oriented to person, place, and time. Vital signs are normal. She appears well-developed and well-nourished. No distress.        Pulmonary/Chest: She exhibits no mass. Right breast exhibits no mass, no nipple discharge, no skin change, no tenderness, no bleeding and no swelling. Left breast exhibits no mass, no nipple discharge, no skin change, no tenderness, no bleeding and no swelling. Breasts are symmetrical.        Abdominal: Soft. Bowel sounds are normal. She exhibits no distension and no mass. There is no abdominal tenderness. There is no rebound.     Genitourinary:    Vagina and uterus normal.   There is no rash, tenderness, lesion or injury on the right labia. There is no rash, tenderness, lesion or injury on the left labia. Cervix is normal. Right adnexum displays no mass, no tenderness and no fullness. Left adnexum displays no mass, no tenderness and no fullness. No erythema,  no vaginal discharge, tenderness, rectocele, cystocele or unspecified prolapse of vaginal walls in the vagina. Cervix exhibits no motion tenderness, no discharge and no friability. Uterus is not deviated, not enlarged, not fixed, not tender and not hosting fibroids.           Musculoskeletal: Normal range of motion and moves all extremeties.      Lymphadenopathy:        Right: No  supraclavicular adenopathy present.        Left: No supraclavicular adenopathy present.    Neurological: She is alert and oriented to person, place, and time.    Skin: Skin is warm and dry.    Psychiatric: She has a normal mood and affect. Her behavior is normal. Judgment normal.      Assessment/ Plan:     1. Incomplete bladder emptying  Urine culture      2. Well female exam with routine gynecological exam  venlafaxine (EFFEXOR-XR) 75 MG 24 hr capsule          No follow-ups on file.    As of April 1, 2021, the Cures Act has been passed nationally. This new law requires that all doctors progress notes, lab results, pathology reports and radiology reports be released IMMEDIATELY to the patient in the patient portal. That means that the results are released to you at the EXACT same time they are released to me. Therefore, with all of the patients that I have I am not able to reply to each patient exactly when the results come in. So there will be a delay from when you see the results to when I see them and have time to come up with a response to send you. Also I only see these results when I am on the computer at work. So if the results come in over the weekend or after 5 pm of a work day, I will not see them until the next business day. As you can tell, this is a challenge as a physician to give every patient the quick response they hope for and deserve. So please be patient! Thanks for understanding, Dr. Reyes

## 2023-01-23 LAB — BACTERIA UR CULT: ABNORMAL

## 2023-01-25 ENCOUNTER — PATIENT MESSAGE (OUTPATIENT)
Dept: OBSTETRICS AND GYNECOLOGY | Facility: CLINIC | Age: 68
End: 2023-01-25
Payer: MEDICARE

## 2023-01-25 DIAGNOSIS — N30.00 ACUTE CYSTITIS WITHOUT HEMATURIA: Primary | ICD-10-CM

## 2023-01-25 RX ORDER — NITROFURANTOIN 25; 75 MG/1; MG/1
100 CAPSULE ORAL 2 TIMES DAILY
Qty: 14 CAPSULE | Refills: 0 | Status: SHIPPED | OUTPATIENT
Start: 2023-01-25 | End: 2023-01-26

## 2023-01-25 NOTE — TELEPHONE ENCOUNTER
LOV with Dr. Del Angel 4/29/19:  Arthralgia of both hands  -No evidence of synovitis on exam  -Reporting multiple trigger fingers, prolonged morning stiffness  -Reporting sister was diagnosed with rheumatoid arthritis  -Recheck rheumatoid factor, anti-CCP antibody, ESR CRP, obtain MRI to evaluate for inflammatory arthritis     Fibromyalgia  -Widespread trigger point tenderness  -Being weaned off all neuromodulator medications by psychiatry, possibly considering other medications  -Reporting flareup of symptoms as Cymbalta is being weaned off      Labs  final in epic and normal.  Component      Latest Ref Rng & Units 4/29/2019   ESR      0 - 20 mm/hr 16   Cyclic Citrul Pep AB       <20 Units 12   RHEUMATOID FACTOR      <15 Units/mL <10   C-REACTIVE PROTEIN      <1.0 mg/dL <0.3         Please call pt and let her know that labs were normal. Continue with recommendations as per LOV.   Rx sent

## 2023-01-26 ENCOUNTER — LAB VISIT (OUTPATIENT)
Dept: LAB | Facility: HOSPITAL | Age: 68
End: 2023-01-26
Payer: MEDICARE

## 2023-01-26 ENCOUNTER — OFFICE VISIT (OUTPATIENT)
Dept: INTERNAL MEDICINE | Facility: CLINIC | Age: 68
End: 2023-01-26
Payer: MEDICARE

## 2023-01-26 ENCOUNTER — PATIENT MESSAGE (OUTPATIENT)
Dept: INTERNAL MEDICINE | Facility: CLINIC | Age: 68
End: 2023-01-26

## 2023-01-26 ENCOUNTER — PATIENT MESSAGE (OUTPATIENT)
Dept: OBSTETRICS AND GYNECOLOGY | Facility: CLINIC | Age: 68
End: 2023-01-26
Payer: MEDICARE

## 2023-01-26 VITALS
WEIGHT: 164.44 LBS | OXYGEN SATURATION: 98 % | HEART RATE: 79 BPM | SYSTOLIC BLOOD PRESSURE: 120 MMHG | BODY MASS INDEX: 26.43 KG/M2 | HEIGHT: 66 IN | DIASTOLIC BLOOD PRESSURE: 80 MMHG

## 2023-01-26 DIAGNOSIS — F32.9 MAJOR DEPRESSION, CHRONIC: ICD-10-CM

## 2023-01-26 DIAGNOSIS — Z12.11 ENCOUNTER FOR COLORECTAL CANCER SCREENING: Primary | ICD-10-CM

## 2023-01-26 DIAGNOSIS — Z90.13 HISTORY OF BILATERAL MASTECTOMY: ICD-10-CM

## 2023-01-26 DIAGNOSIS — E78.49 OTHER HYPERLIPIDEMIA: ICD-10-CM

## 2023-01-26 DIAGNOSIS — Z85.3 HISTORY OF BREAST CANCER: ICD-10-CM

## 2023-01-26 DIAGNOSIS — Z12.12 ENCOUNTER FOR COLORECTAL CANCER SCREENING: Primary | ICD-10-CM

## 2023-01-26 DIAGNOSIS — Z12.11 ENCOUNTER FOR COLORECTAL CANCER SCREENING: ICD-10-CM

## 2023-01-26 DIAGNOSIS — I10 PRIMARY HYPERTENSION: ICD-10-CM

## 2023-01-26 DIAGNOSIS — Z00.00 ANNUAL PHYSICAL EXAM: ICD-10-CM

## 2023-01-26 DIAGNOSIS — R19.7 DIARRHEA OF PRESUMED INFECTIOUS ORIGIN: ICD-10-CM

## 2023-01-26 DIAGNOSIS — Z12.12 ENCOUNTER FOR COLORECTAL CANCER SCREENING: ICD-10-CM

## 2023-01-26 DIAGNOSIS — Z00.00 ANNUAL PHYSICAL EXAM: Primary | ICD-10-CM

## 2023-01-26 LAB
ALBUMIN SERPL BCP-MCNC: 4.4 G/DL (ref 3.5–5.2)
ALP SERPL-CCNC: 55 U/L (ref 55–135)
ALT SERPL W/O P-5'-P-CCNC: 19 U/L (ref 10–44)
AMYLASE SERPL-CCNC: 71 U/L (ref 20–110)
ANION GAP SERPL CALC-SCNC: 12 MMOL/L (ref 8–16)
AST SERPL-CCNC: 19 U/L (ref 10–40)
BASOPHILS # BLD AUTO: 0.09 K/UL (ref 0–0.2)
BASOPHILS NFR BLD: 1.1 % (ref 0–1.9)
BILIRUB SERPL-MCNC: 1 MG/DL (ref 0.1–1)
BUN SERPL-MCNC: 16 MG/DL (ref 8–23)
CALCIUM SERPL-MCNC: 10 MG/DL (ref 8.7–10.5)
CHLORIDE SERPL-SCNC: 108 MMOL/L (ref 95–110)
CO2 SERPL-SCNC: 23 MMOL/L (ref 23–29)
CREAT SERPL-MCNC: 0.8 MG/DL (ref 0.5–1.4)
DIFFERENTIAL METHOD: ABNORMAL
EOSINOPHIL # BLD AUTO: 0.2 K/UL (ref 0–0.5)
EOSINOPHIL NFR BLD: 2.4 % (ref 0–8)
ERYTHROCYTE [DISTWIDTH] IN BLOOD BY AUTOMATED COUNT: 14.2 % (ref 11.5–14.5)
EST. GFR  (NO RACE VARIABLE): >60 ML/MIN/1.73 M^2
GLUCOSE SERPL-MCNC: 95 MG/DL (ref 70–110)
HCT VFR BLD AUTO: 43.5 % (ref 37–48.5)
HGB BLD-MCNC: 13.5 G/DL (ref 12–16)
IMM GRANULOCYTES # BLD AUTO: 0.02 K/UL (ref 0–0.04)
IMM GRANULOCYTES NFR BLD AUTO: 0.3 % (ref 0–0.5)
LIPASE SERPL-CCNC: 28 U/L (ref 4–60)
LYMPHOCYTES # BLD AUTO: 2.4 K/UL (ref 1–4.8)
LYMPHOCYTES NFR BLD: 30.4 % (ref 18–48)
MCH RBC QN AUTO: 29.3 PG (ref 27–31)
MCHC RBC AUTO-ENTMCNC: 31 G/DL (ref 32–36)
MCV RBC AUTO: 95 FL (ref 82–98)
MONOCYTES # BLD AUTO: 0.6 K/UL (ref 0.3–1)
MONOCYTES NFR BLD: 7.8 % (ref 4–15)
NEUTROPHILS # BLD AUTO: 4.6 K/UL (ref 1.8–7.7)
NEUTROPHILS NFR BLD: 58 % (ref 38–73)
NRBC BLD-RTO: 0 /100 WBC
PLATELET # BLD AUTO: 263 K/UL (ref 150–450)
PMV BLD AUTO: 10.1 FL (ref 9.2–12.9)
POTASSIUM SERPL-SCNC: 4.4 MMOL/L (ref 3.5–5.1)
PROT SERPL-MCNC: 7.2 G/DL (ref 6–8.4)
RBC # BLD AUTO: 4.6 M/UL (ref 4–5.4)
SODIUM SERPL-SCNC: 143 MMOL/L (ref 136–145)
TSH SERPL DL<=0.005 MIU/L-ACNC: 0.63 UIU/ML (ref 0.4–4)
WBC # BLD AUTO: 7.96 K/UL (ref 3.9–12.7)

## 2023-01-26 PROCEDURE — 99214 PR OFFICE/OUTPT VISIT, EST, LEVL IV, 30-39 MIN: ICD-10-PCS | Mod: S$PBB,,, | Performed by: NURSE PRACTITIONER

## 2023-01-26 PROCEDURE — 83690 ASSAY OF LIPASE: CPT | Performed by: NURSE PRACTITIONER

## 2023-01-26 PROCEDURE — 99214 OFFICE O/P EST MOD 30 MIN: CPT | Mod: S$PBB,,, | Performed by: NURSE PRACTITIONER

## 2023-01-26 PROCEDURE — 99214 OFFICE O/P EST MOD 30 MIN: CPT | Mod: PBBFAC | Performed by: NURSE PRACTITIONER

## 2023-01-26 PROCEDURE — 36415 COLL VENOUS BLD VENIPUNCTURE: CPT | Performed by: NURSE PRACTITIONER

## 2023-01-26 PROCEDURE — 99999 PR PBB SHADOW E&M-EST. PATIENT-LVL IV: ICD-10-PCS | Mod: PBBFAC,,, | Performed by: NURSE PRACTITIONER

## 2023-01-26 PROCEDURE — 99999 PR PBB SHADOW E&M-EST. PATIENT-LVL IV: CPT | Mod: PBBFAC,,, | Performed by: NURSE PRACTITIONER

## 2023-01-26 PROCEDURE — 80053 COMPREHEN METABOLIC PANEL: CPT | Performed by: NURSE PRACTITIONER

## 2023-01-26 PROCEDURE — 84443 ASSAY THYROID STIM HORMONE: CPT | Performed by: NURSE PRACTITIONER

## 2023-01-26 PROCEDURE — 85025 COMPLETE CBC W/AUTO DIFF WBC: CPT | Performed by: NURSE PRACTITIONER

## 2023-01-26 PROCEDURE — 82150 ASSAY OF AMYLASE: CPT | Performed by: NURSE PRACTITIONER

## 2023-01-26 RX ORDER — NITROFURANTOIN 25; 75 MG/1; MG/1
100 CAPSULE ORAL 2 TIMES DAILY
Qty: 14 CAPSULE | Refills: 0 | Status: SHIPPED | OUTPATIENT
Start: 2023-01-26 | End: 2023-02-14

## 2023-01-26 NOTE — PROGRESS NOTES
Internal Medicine Annual Exam       CHIEF COMPLAINT     The patient, Nguyen Ivan, who is a 67 y.o. female presents for an annual exam.    HPI   Here for annual     Diarrhea- reports intermittent diarrhea x6-12 months   3-4 times per week with 3-5 episodes per day.   No blood in stool   No weight loss without trying   Describes as mush. No greasy stools and no undigested foods.   No cramping or abd bloating proceeding.   Feels pre-syncopal after diarrhea   Cannot make connection to foods     Back pian- s/p lumbar back surgery. Pain has decreased at L3,4,5  Still having pain at other levels. Followed by Pain mgmt and neurosurgery. Having ESIs     Breast cancer- completed 3.5 years aog tamoxifen   Has left axillary lump that they are watching     Insomnia- taking belsomra     Depression- taking effexor doing well     HM-  Dexa- needs   CRCS- needs will order today   MMG- followed by Dr Reyes           Past Medical History:  Past Medical History:   Diagnosis Date    Breast cancer 2017    Birads 4  Bilateral Mastectomy     Depression     Family history of breast cancer in sister     age 45,lumpectomy,XRT    H/O abnormal mammogram 10/30/2017    Birads 4    Mitral valve prolapse        Past Surgical History:   Procedure Laterality Date    BILATERAL MASTECTOMY Bilateral 2017    BREAST LUMPECTOMY  2017    atypical ductal hyperplasia     BREAST RECONSTRUCTION Bilateral 3/2018 & 2018    CHOLECYSTECTOMY  2005    COLONOSCOPY      DILATION AND CURETTAGE OF UTERUS  1983    Missed Ab    EYE SURGERY  2017    cataracts    HERNIA REPAIR      LIVER LOBECTOMY      REDUCTION OF BOTH BREASTS  2009    TONSILLECTOMY  1970    TUBAL LIGATION  1985    with last delivery     UMBILICAL HERNIA REPAIR  2006        Family History   Problem Relation Age of Onset    Breast cancer Sister 45        XRT    Cancer Sister     Heart attack Father     Alcohol abuse Father             Depression Father     Dementia  Mother     Alzheimer's disease Mother     Arthritis Mother             Cancer Mother     Hearing loss Mother     Miscarriages / Stillbirths Mother     Breast cancer Paternal Aunt     Breast cancer Maternal Aunt     Alcohol abuse Brother             Kidney disease Brother     Alcohol abuse Sister     Alcohol abuse Brother     Alcohol abuse Brother     Alcohol abuse Brother             Drug abuse Brother     Kidney disease Brother     Miscarriages / Stillbirths Sister     Colon cancer Neg Hx     Ovarian cancer Neg Hx     Uterine cancer Neg Hx     Cervical cancer Neg Hx     Prostate cancer Neg Hx         Social History     Socioeconomic History    Marital status:    Tobacco Use    Smoking status: Never    Smokeless tobacco: Never   Substance and Sexual Activity    Alcohol use: Yes     Alcohol/week: 0.0 - 4.0 standard drinks     Comment: Social     Drug use: Yes     Types: Marijuana     Comment: [facility] gummies/medical marijuana    Sexual activity: Yes     Partners: Male     Birth control/protection: Post-menopausal, See Surgical Hx     Comment: tubal ligation 198        Social History     Tobacco Use   Smoking Status Never   Smokeless Tobacco Never        Allergies as of 2023    (No Known Allergies)          Home Medications:  Prior to Admission medications    Medication Sig Start Date End Date Taking? Authorizing Provider   acetaminophen (TYLENOL) 650 MG TbSR  21  Yes Historical Provider   ALPRAZolam (XANAX) 0.25 MG tablet Take 1 tablet (0.25 mg total) by mouth every evening. 3/24/22  Yes Teresa Biswas MD   cholecalciferol, vitamin D3, 125 mcg (5,000 unit) capsule Take 1 capsule (5,000 Units total) by mouth once daily. 22 Yes Suraj Obrien MD   losartan (COZAAR) 25 MG tablet TAKE 1 TABLET(25 MG) BY MOUTH EVERY DAY 22  Yes Teresa Biswas MD   rosuvastatin (CRESTOR) 20 MG tablet Take 1 tablet (20 mg total) by mouth once daily. 22  Yes Godwin Alexandre MD   suvorexant  (BELSOMRA) 20 mg Tab TAKE 1/2 TABLET BY MOUTH EVERY EVENING 10/4/22  Yes Suraj Obrien MD   venlafaxine (EFFEXOR-XR) 75 MG 24 hr capsule Take 2 capsules (150 mg total) by mouth once daily. 1/20/23  Yes Lydia Reyes MD   vitamin B complex-folic acid 0.4 mg Tab Take 1 tablet by mouth once daily. 11/1/22 11/1/23 Yes Suraj Obrien MD   D3-red wine-resveratrol-malt 5,000-200 unit-mg Cap Take 1 tablet by mouth once daily at 6am.  Patient not taking: Reported on 1/26/2023 4/19/22   Suraj Obrien MD   nitrofurantoin, macrocrystal-monohydrate, (MACROBID) 100 MG capsule Take 1 capsule (100 mg total) by mouth 2 (two) times daily. for 7 days  Patient not taking: Reported on 1/26/2023 1/25/23 2/1/23  Lydia Reyes MD   oxyCODONE-acetaminophen (PERCOCET) 5-325 mg per tablet Take 1 tablet by mouth daily as needed. 12/27/22   Historical Provider       Review of Systems:  Review of Systems   Constitutional:  Negative for chills, diaphoresis, fever and unexpected weight change.   Respiratory:  Negative for cough, shortness of breath and wheezing.    Cardiovascular:  Negative for chest pain and palpitations.   Gastrointestinal:  Positive for diarrhea and nausea. Negative for abdominal pain, blood in stool, constipation and vomiting.   Genitourinary:  Negative for dysuria and frequency.   Musculoskeletal:  Positive for arthralgias and back pain.   Neurological:  Positive for light-headedness. Negative for dizziness and headaches.     Health Maintainence:   Immunizations:  Health Maintenance         Date Due Completion Date    Hepatitis C Screening Never done ---    Pneumococcal Vaccines (Age 65+) (2 - PPSV23 if available, else PCV20) 05/09/2020 5/9/2019    Colorectal Cancer Screening 02/01/2021 2/1/2011 (Done)    Override on 2/1/2011: Done    COVID-19 Vaccine (2 - Moderna series) 03/19/2021 2/19/2021    DEXA Scan 05/21/2021 5/21/2019    Influenza Vaccine (1) 09/01/2022 11/12/2020    Lipid Panel 11/13/2025 11/13/2020    TETANUS  "VACCINE 05/09/2029 5/9/2019             PHYSICAL EXAM     /80 (BP Location: Left arm, Patient Position: Sitting, BP Method: Medium (Manual))   Pulse 79   Ht 5' 6" (1.676 m)   Wt 74.6 kg (164 lb 7.4 oz)   LMP  (LMP Unknown) Comment:   2003  SpO2 98%   BMI 26.55 kg/m²  Body mass index is 26.55 kg/m².    Physical Exam  Vitals reviewed.   Constitutional:       Appearance: She is well-developed.   HENT:      Head: Normocephalic.      Right Ear: External ear normal.      Left Ear: External ear normal.      Nose: Nose normal.      Mouth/Throat:      Pharynx: No oropharyngeal exudate.   Eyes:      Pupils: Pupils are equal, round, and reactive to light.   Neck:      Thyroid: No thyromegaly.      Vascular: No JVD.      Trachea: No tracheal deviation.   Cardiovascular:      Rate and Rhythm: Normal rate and regular rhythm.      Heart sounds: Normal heart sounds. No murmur heard.    No friction rub. No gallop.   Pulmonary:      Effort: Pulmonary effort is normal. No respiratory distress.      Breath sounds: Normal breath sounds. No wheezing or rales.   Abdominal:      General: Bowel sounds are normal. There is no distension.      Palpations: Abdomen is soft.      Tenderness: There is no abdominal tenderness.   Musculoskeletal:         General: No tenderness. Normal range of motion.      Cervical back: Neck supple.   Lymphadenopathy:      Cervical: No cervical adenopathy.   Skin:     General: Skin is warm and dry.      Findings: No rash.   Neurological:      Mental Status: She is alert and oriented to person, place, and time.   Psychiatric:         Behavior: Behavior normal.       LABS     No results found for: LABA1C, HGBA1C  CMP  Sodium   Date Value Ref Range Status   12/16/2021 139 136 - 145 mmol/L Final     Potassium   Date Value Ref Range Status   12/16/2021 4.0 3.5 - 5.1 mmol/L Final     Chloride   Date Value Ref Range Status   12/16/2021 102 95 - 110 mmol/L Final     CO2   Date Value Ref Range Status "   12/16/2021 27 23 - 29 mmol/L Final     Glucose   Date Value Ref Range Status   12/16/2021 93 70 - 110 mg/dL Final     BUN   Date Value Ref Range Status   12/16/2021 15 8 - 23 mg/dL Final     Creatinine   Date Value Ref Range Status   12/16/2021 0.7 0.5 - 1.4 mg/dL Final     Calcium   Date Value Ref Range Status   12/16/2021 9.6 8.7 - 10.5 mg/dL Final     Total Protein   Date Value Ref Range Status   12/16/2021 7.4 6.0 - 8.4 g/dL Final     Albumin   Date Value Ref Range Status   12/16/2021 4.5 3.5 - 5.2 g/dL Final     Total Bilirubin   Date Value Ref Range Status   12/16/2021 0.6 0.1 - 1.0 mg/dL Final     Comment:     For infants and newborns, interpretation of results should be based  on gestational age, weight and in agreement with clinical  observations.    Premature Infant recommended reference ranges:  Up to 24 hours.............<8.0 mg/dL  Up to 48 hours............<12.0 mg/dL  3-5 days..................<15.0 mg/dL  6-29 days.................<15.0 mg/dL       Alkaline Phosphatase   Date Value Ref Range Status   12/16/2021 46 (L) 55 - 135 U/L Final     AST   Date Value Ref Range Status   12/16/2021 19 10 - 40 U/L Final     ALT   Date Value Ref Range Status   12/16/2021 21 10 - 44 U/L Final     Anion Gap   Date Value Ref Range Status   12/16/2021 10 8 - 16 mmol/L Final     eGFR if    Date Value Ref Range Status   12/16/2021 >60.0 >60 mL/min/1.73 m^2 Final     eGFR if non    Date Value Ref Range Status   12/16/2021 >60.0 >60 mL/min/1.73 m^2 Final     Comment:     Calculation used to obtain the estimated glomerular filtration  rate (eGFR) is the CKD-EPI equation.        Lab Results   Component Value Date    WBC 7.55 12/16/2021    HGB 14.0 12/16/2021    HCT 43.6 12/16/2021    MCV 97 12/16/2021     12/16/2021     Lab Results   Component Value Date    CHOL 225 (H) 11/13/2020    CHOL 200 (H) 05/14/2019     Lab Results   Component Value Date    HDL 58 11/13/2020    HDL 55  05/14/2019     Lab Results   Component Value Date    LDLCALC 138.0 11/13/2020    LDLCALC 120.0 05/14/2019     Lab Results   Component Value Date    TRIG 145 11/13/2020    TRIG 125 05/14/2019     Lab Results   Component Value Date    CHOLHDL 25.8 11/13/2020    CHOLHDL 27.5 05/14/2019     Lab Results   Component Value Date    TSH 2.158 08/18/2021    [id number] 8.2 08/18/2021       ASSESSMENT/PLAN     Nguyen Ivan is a 67 y.o. female    Annual physical exam- All age and gender related screenings discussed   -     CBC Auto Differential; Future; Expected date: 01/26/2023  -     Comprehensive Metabolic Panel; Future; Expected date: 01/26/2023  -     TSH; Future; Expected date: 01/26/2023    Diarrhea of presumed infectious origin- will repeat labs and send for stool studies and c-scope   -     CBC Auto Differential; Future; Expected date: 01/26/2023  -     Comprehensive Metabolic Panel; Future; Expected date: 01/26/2023  -     LIPASE; Future; Expected date: 01/26/2023  -     AMYLASE; Future; Expected date: 01/26/2023  -     CULTURE, STOOL; Future; Expected date: 01/26/2023  -     Stool Exam-Ova,Cysts,Parasites; Future; Expected date: 01/26/2023  -     WBC, Stool; Future; Expected date: 01/26/2023  -     CLOSTRIDIUM DIFFICILE; Future; Expected date: 01/26/2023    Encounter for colorectal cancer screening  -     Ambulatory referral/consult to Endo Procedure ; Future; Expected date: 01/27/2023    Primary hypertension- stable. Will cont losartan     History of breast cancer/History of bilateral mastectomy- stable. Will f/u with gyn and cont annual mammograms     Other hyperlipidemia- stable. Will cont statin     Major depression, chronic- stable. Will cont effexor and belsomra     Other orders  -     nitrofurantoin, macrocrystal-monohydrate, (MACROBID) 100 MG capsule; Take 1 capsule (100 mg total) by mouth 2 (two) times daily.  Dispense: 14 capsule; Refill: 0           Follow up with PCP     Dawn Black  [place], ALEXANDER, FNP-c   Department of Internal Medicine - [facility]  1:43 PM

## 2023-01-27 ENCOUNTER — TELEPHONE (OUTPATIENT)
Dept: NEUROLOGY | Facility: CLINIC | Age: 68
End: 2023-01-27
Payer: MEDICARE

## 2023-01-28 NOTE — TELEPHONE ENCOUNTER
----- Message from Suraj Obrien MD sent at 1/27/2023  4:21 PM CST -----  Hi,  Please schedule the patient for a video follow-up to discuss genetic results.  Suraj Kaiser

## 2023-01-30 ENCOUNTER — LAB VISIT (OUTPATIENT)
Dept: LAB | Facility: HOSPITAL | Age: 68
End: 2023-01-30
Payer: MEDICARE

## 2023-01-30 DIAGNOSIS — R19.7 DIARRHEA OF PRESUMED INFECTIOUS ORIGIN: ICD-10-CM

## 2023-01-30 PROCEDURE — 87046 STOOL CULTR AEROBIC BACT EA: CPT | Mod: 59 | Performed by: NURSE PRACTITIONER

## 2023-01-30 PROCEDURE — 87045 FECES CULTURE AEROBIC BACT: CPT | Performed by: NURSE PRACTITIONER

## 2023-01-30 PROCEDURE — 89055 LEUKOCYTE ASSESSMENT FECAL: CPT | Performed by: NURSE PRACTITIONER

## 2023-01-30 PROCEDURE — 87427 SHIGA-LIKE TOXIN AG IA: CPT | Performed by: NURSE PRACTITIONER

## 2023-01-31 ENCOUNTER — TELEPHONE (OUTPATIENT)
Dept: NEUROLOGY | Facility: CLINIC | Age: 68
End: 2023-01-31
Payer: MEDICARE

## 2023-01-31 LAB — WBC #/AREA STL HPF: NORMAL /[HPF]

## 2023-02-01 LAB
E COLI SXT1 STL QL IA: NEGATIVE
E COLI SXT2 STL QL IA: NEGATIVE

## 2023-02-03 LAB
BACTERIA STL CULT: NORMAL
O+P STL MICRO: NORMAL

## 2023-02-07 ENCOUNTER — OFFICE VISIT (OUTPATIENT)
Dept: CARDIOLOGY | Facility: CLINIC | Age: 68
End: 2023-02-07
Payer: MEDICARE

## 2023-02-07 VITALS
WEIGHT: 164.69 LBS | SYSTOLIC BLOOD PRESSURE: 132 MMHG | RESPIRATION RATE: 20 BRPM | DIASTOLIC BLOOD PRESSURE: 80 MMHG | BODY MASS INDEX: 26.47 KG/M2 | HEART RATE: 72 BPM | HEIGHT: 66 IN

## 2023-02-07 DIAGNOSIS — I10 PRIMARY HYPERTENSION: Primary | ICD-10-CM

## 2023-02-07 DIAGNOSIS — E78.49 OTHER HYPERLIPIDEMIA: ICD-10-CM

## 2023-02-07 DIAGNOSIS — I10 BENIGN ESSENTIAL HYPERTENSION: ICD-10-CM

## 2023-02-07 DIAGNOSIS — Z01.810 PREOPERATIVE CARDIOVASCULAR EXAMINATION: ICD-10-CM

## 2023-02-07 PROCEDURE — 99999 PR PBB SHADOW E&M-EST. PATIENT-LVL III: CPT | Mod: PBBFAC,,, | Performed by: INTERNAL MEDICINE

## 2023-02-07 PROCEDURE — 99214 PR OFFICE/OUTPT VISIT, EST, LEVL IV, 30-39 MIN: ICD-10-PCS | Mod: S$PBB,,, | Performed by: INTERNAL MEDICINE

## 2023-02-07 PROCEDURE — 99213 OFFICE O/P EST LOW 20 MIN: CPT | Mod: PBBFAC | Performed by: INTERNAL MEDICINE

## 2023-02-07 PROCEDURE — 99214 OFFICE O/P EST MOD 30 MIN: CPT | Mod: S$PBB,,, | Performed by: INTERNAL MEDICINE

## 2023-02-07 PROCEDURE — 99999 PR PBB SHADOW E&M-EST. PATIENT-LVL III: ICD-10-PCS | Mod: PBBFAC,,, | Performed by: INTERNAL MEDICINE

## 2023-02-07 RX ORDER — ROSUVASTATIN CALCIUM 20 MG/1
20 TABLET, COATED ORAL DAILY
Qty: 90 TABLET | Refills: 3 | Status: SHIPPED | OUTPATIENT
Start: 2023-02-07 | End: 2024-01-10 | Stop reason: SDUPTHER

## 2023-02-07 RX ORDER — LOSARTAN POTASSIUM 25 MG/1
25 TABLET ORAL DAILY
Qty: 90 TABLET | Refills: 3 | Status: SHIPPED | OUTPATIENT
Start: 2023-02-07 | End: 2024-01-10 | Stop reason: SDUPTHER

## 2023-02-07 NOTE — PROGRESS NOTES
HISTORY:    67-year-old female with a history of breast cancer status post bilateral mastectomy '17, hepatic lobectomy for benign mass '05, mitral valve prolapse, hypertension, DDD s/p fusion '22 presenting for follow-up.     The patient was initially evaluated by me in late 2021 prior to anticipated back surgery.  She tolerated this without issue and has done well with a marked improvement in her back pain. She now has another repeat procedure next month.     She is very active in her life without any CV limitation. She is limited by her DDD. She completes ADLs and is active in her life. She has 18 grandchildren aged 1-17 yo and at any given time maybe taking care of a baby and watching upto 4 children under the age of 5. She has no issues keeping up with them. She is a retired  and .     She does have a h/o MVP. When she was younger she had a chronic chest pain syndrome with multiple TTE and stress tests at an OSH system that were unremarkable. She has had no issues with any of these symptoms in the last 2 decades.     The patient denies any symptoms of chest pain, shortness of breath, or dyspnea on exertion. The patient denies any previous history of myocardial infarction, coronary artery disease, peripheral arterial disease, stroke, congestive heart failure, or cardiomyopathy. Nonsmoker and no FH of premature CAD.     She currently tolerates losartan 25x1 and rosuvastatin 20x1.     PHYSICAL EXAM:    Vitals:    02/07/23 1020   BP: 132/80   Pulse: 72   Resp: 20     NAD, A+Ox3.  No jvd, no bruit.  RRR nml s1,s2. No murmurs.  CTA B no wheezes or crackles.  2+ B radial and 1+ B DP/PT. No edema.    LABS/STUDIES (imaging reviewed during clinic visit):    January 2023 CBC and CMP normal.  2020 LDL is 138 HDL is 58.  EKG December 2021 demonstrates sinus rhythm with no Q-waves or ST changes.  Chest x-ray December 2021 clear with normal cardiac size.  CT Chest 7/2021 @ OSH no cv  abnormality  MRI Brain August 2021 Unremarkable     ASSESSMENT & PLAN:    1. Primary hypertension    2. Other hyperlipidemia    3. Preoperative cardiovascular examination    4. Benign essential hypertension        Orders Placed This Encounter    Lipid Panel    rosuvastatin (CRESTOR) 20 MG tablet    losartan (COZAAR) 25 MG tablet          Tolerating rosuvastatin 20 x 1.  Pre statin LDL is 138, can follow-up with next set of labs.    Bps controlled on losartan 25 x 1.     No CV contraindications to proceeding with anticipated back surgery. She is considered low risk.      Godwin Alexandre MD

## 2023-02-14 ENCOUNTER — OFFICE VISIT (OUTPATIENT)
Dept: NEUROLOGY | Facility: CLINIC | Age: 68
End: 2023-02-14
Payer: MEDICARE

## 2023-02-14 ENCOUNTER — TELEPHONE (OUTPATIENT)
Dept: NEUROLOGY | Facility: CLINIC | Age: 68
End: 2023-02-14
Payer: MEDICARE

## 2023-02-14 DIAGNOSIS — R41.89 SUBJECTIVE COGNITIVE IMPAIRMENT: Primary | ICD-10-CM

## 2023-02-14 DIAGNOSIS — G47.30 SLEEP APNEA, UNSPECIFIED TYPE: ICD-10-CM

## 2023-02-14 DIAGNOSIS — T45.1X5S ADVERSE EFFECT OF CHEMOTHERAPY, SEQUELA: ICD-10-CM

## 2023-02-14 DIAGNOSIS — G47.00 INSOMNIA, UNSPECIFIED TYPE: ICD-10-CM

## 2023-02-14 PROCEDURE — 99214 OFFICE O/P EST MOD 30 MIN: CPT | Mod: 95,,, | Performed by: PSYCHIATRY & NEUROLOGY

## 2023-02-14 PROCEDURE — 96116 PR NEUROBEHAVIORAL STATUS EXAM BY PSYCH/PHYS: ICD-10-PCS | Mod: 95,59,, | Performed by: PSYCHIATRY & NEUROLOGY

## 2023-02-14 PROCEDURE — 96116 NUBHVL XM PHYS/QHP 1ST HR: CPT | Mod: 95,59,, | Performed by: PSYCHIATRY & NEUROLOGY

## 2023-02-14 PROCEDURE — 99214 PR OFFICE/OUTPT VISIT, EST, LEVL IV, 30-39 MIN: ICD-10-PCS | Mod: 95,,, | Performed by: PSYCHIATRY & NEUROLOGY

## 2023-02-14 NOTE — TELEPHONE ENCOUNTER
----- Message from Mary Tejada MA sent at 2/14/2023  2:38 PM CST -----  Contact: [phone]  Pt reports she had been waiting to have her 2:30pm virtual visit and completed all the steps. Informed pt it does not show that she is in arrived status and advised to contact [facility]. Pt can be reached at [phone].

## 2023-02-14 NOTE — PROGRESS NOTES
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 their tongue beyond Her lips for >10 sec.  Speech/Language:   The patient's speech was fluent, non-effortful, and her rate was appropriate to the context.  She has no articulation (segmental features) errors.  The patient's speech is not dysarthric.  The patient's speech was without evidence of anomia.  She makes no phonological loop errors.  Motor:   The patient's bilateral upper extremity muscle bulk is appropriate.  Assessment of motor strength was symmetric and at minimal anti-gravity.  There is no pronator or downward drift.    Neuropsychological Evaluation Summary:  Prior Neurocognitive/Neurobehavioral Evaluation(s)  Informal Neurocognitive Assessment 8/18/21  - MMSE 28/28  - MOCA 27/29  2021-10-05:  Questionable Visuospatial Impairment: visuospatial construction.  BEHAV5+ 1/6: See ROS section for a full description  2022-04-19:  Subjective Cognitive impairment - Questionable Visuospatial Impairment: visuospatial construction.  BEHAV5+ 2/6: See ROS section for a full description  Neurocognitive/Neurobehavioral Evaluation completed on 2023-02-14     Neuropsychiatric/Behavioral Focused Evaluation Assessment    BEHAV5+ 2/6 See ROS section for a full description   Laboratories:     Lab Date Value [Reference]   Neurodegenerative Cerebrospinal Fluid Assessment           Abeta42 2022, Doe-15  2022, Jun-15    888 [>1026 pg/mL]  888 [>1026 pg/mL]      p-Tau/Abeta42 2022, Jun-15    0.015 [<=0.023 ratio]      Phospho-Tau(181P) 2022, Jun-15  2022, Jun-15    13 [<=21.7 pg/mL]  13 [<=21.7 pg/mL]      Total-Tau 2022, Jun-15  2022, Jun-15    169 [<=238 pg/mL]  169 [<=238 pg/mL]      Cerebrospinal Fluid Assessment   Albumin, CSF 2022, Doe-15    24.8 [<=27.0 ]      Glucose, CSF 2022, Doe-15    57 [40 - 70 mg/dL]      [place] Free Light Chain, CSF 2022, Doe-15    0.0108      Neuron Specific Enolase, CSF 2022, Doe-15    19 (H) [ng/mL]      Protein, CSF 2022, Jun-15    52 (H) [15 - 40 mg/dL]      VDRL, CSF 2022, Doe-15    Negative      Appearance, CSF 2022, Doe-15    Clear [Clear]      COLOR CSF 2022, Doe-15    Colorless [Colorless]      Lymphs, CSF 2022, Doe-15    76 [40 - 80 %]      Mono/Macrophage, CSF 2022, Jun-15    18 [15 - 45 %]      RBC, CSF 2022, Doe-15    239 (A) [0 /cu mm]      Segmented Neutrophils, CSF 2022, Doe-15    6 [0 - 6 %]      WBC, CSF 2022, Doe-15    1 [0 - 5 /cu mm]      Coagulopathy Screening   aPTT 08/18/2021  22.8 [21.0 - 32.0 sec]  22.8 [21.0 - 32.0 sec]      Metabolic Screening   Homocysteine 08/18/2021  9.5 [4.0 - 15.5 umol/L]  9.5 [4.0 - 15.5 umol/L]  9.5 [4.0 - 15.5 umol/L]      Methlymalonic Acid 08/18/2021  0.14  0.14  0.14      T4 Total 08/18/2021  8.2 [4.5 - 11.5 ug/dL]  8.2 [4.5 - 11.5 ug/dL]      TSH 11/13/2020  2.158 [0.400 - 4.000 uIU/mL]  2.158 [0.40 - 4.00 uIU/mL]  0.652 [0.400 - 4.000 uIU/mL]      Glucose 2021, Dec-16    93 [70 - 110 mg/dL]      Albumin 2021, Dec-16  2021, Dec-16  2020, Nov-13    4.5 [3.5 - 5.2 g/dL]  4.5 [3.5 - 5.2 g/dL]  4.3 [3.5 - 5.2 g/dL]      Alkaline Phosphatase 2021, Dec-16  2021, Dec-16  2020, Nov-13    46 (L) [55  - 135 U/L]  46 (L) [55 - 135 U/L]  62 [55 - 135 U/L]      ALT 2021, Dec-16  2021, Dec-16  2020, Nov-13    21 [10 - 44 U/L]  21 [10 - 44 U/L]  25 [10 - 44 U/L]      AST 2021, Dec-16  2021, Dec-16  2020, Nov-13    19 [10 - 40 U/L]  19 [10 - 40 U/L]  20 [10 - 40 U/L]      BILIRUBIN TOTAL 2021, Dec-16  2021, Dec-16  2020, Nov-13    0.6 [0.1 - 1.0 mg/dL]  0.6 [0.1 - 1.0 mg/dL]  0.8 [0.1 - 1.0 mg/dL]      PROTEIN TOTAL 2021, Dec-16  2021, Dec-16  2020, Nov-13    7.4 [6.0 - 8.4 g/dL]  7.4 [6.0 - 8.4 g/dL]  7.2 [6.0 - 8.4 g/dL]      Cholesterol 2020, Nov-13    225 (H) [120 - 199 mg/dL]      HDL 2020, Nov-13    58 [40 - 75 mg/dL]      Non-HDL Cholesterol 2020, Nov-13    167 [mg/dL]      Triglycerides 08/18/2021  145 [30 - 150 mg/dL]      Folate 08/18/20212021, Aug-18    19.1 [4.0 - 24.0 ng/mL]  19.1 [4.0 - 24.0 ng/mL]  19.1 [4.0 - 24.0 ng/mL]      Vit D, 25-Hydroxy 08/18/2021  41 [30 - 96 ng/mL]  41 [30 - 96 ng/mL]      Vitamin B-12 08/18/20212021, Aug-18    745 [180 - 914 ng/L]  745 [180 - 914 ng/L]  745 [180 - 914 ng/L]      Autoimmune/Paraneoplastic Screening   PNEOE AGNA-1, Csf 2022, Doe-15    Negative      PNEOE YUSRA-1, Csf 2022, Doe-15    Negative      PNEOE YUSRA-2, Csf 2022, Doe-15    Negative      PNEOE YUSRA-3, Csf 2022, Doe-15    Negative      PNEOE, CRMP-5-IgG, Csf 2022, Doe-15    Negative      PNEOE, Amphiphysin Ab, Csf 2022, Doe-15    Negative      PNEOE, PCA-1, Csf 2022, Doe-15    Negative      PNEOE, PCA-2, Csf 2022, Doe-15    Negative      PNEOE, PCA-Tr, Csf 2022, Doe-15    Negative      Infectious Disease/Immunocompromised Screening   SARS-CoV-2 RNA, Amplification, Qual 08/18/2021  Negative  Negative      HIV 1/2 Ag/Ab 08/18/2021  Negative  Negative  Negative      Syphilis Treponemal Ab 08/18/20212021, Aug-18    Nonreactive [Nonreactive]  Nonreactive [Nonreactive]      Standard Hematology Screen   Hematocrit 2021, Dec-16    43.6 [37.0 - 48.5 %]      Hemoglobin 2021, Dec-16    14.0 [12.0 - 16.0 g/dL]      MCV  2021, Dec-16    97 [82 - 98 fL]      Platelets 2021, Dec-16    329 [150 - 450 K/uL]      Neuroendocrine/Electrolyte Screening   BUN 2021, Dec-16    15 [8 - 23 mg/dL]      Chloride 2021, Dec-16    102 [95 - 110 mmol/L]      Creatinine 2021, Dec-16    0.7 [0.5 - 1.4 mg/dL]      Potassium 2021, Dec-16    4.0 [3.5 - 5.1 mmol/L]      Sodium 2021, Dec-16    139 [136 - 145 mmol/L]      Delirium Screening   Glucose, UA 2021, Dec-16    Negative      Ketones, UA 2021, Dec-16    Negative      Leukocytes, UA 2021, Dec-16    Negative      NITRITE UA 2021, Dec-16    Negative      Protein, UA 2021, Dec-16    Negative           Neuroimaging:    MRI brain/head without contrast on 10/04/2021  Technique: Multiplanar multisequence MR imaging of the brain was performed without intravenous contrast.  Formal interpretation by Radiology:  No evidence of acute or chronic intracranial pathology.  Independently reviewed radiological imaging by Suraj Ravi MD. MPH. Behavioral Neurologist  T1: No significant cortical atrophy with age-appropriate changes. Subcortical nuclei and hippocampi are without significant atrophy.  T2/FLAIR: No Significant hyperintensities appreciated on MRI T2/FLAIR  DWI/ADC: No Significant DWI hyperintensities/hypointensities. No ADC correlation.  SWI/GRE: No Significant hypointensities to suggest cortical/subcortical hemosiderin deposition.  Impression: : Normal Brain Imaging.    MRI brain/head without contrast on 9/6/2012  Formal interpretation by Radiology:  not available  Independently reviewed radiological imaging by Suraj Ravi MD. MPH. Behavioral Neurologist  T1: No significant cortical atrophy with age-appropriate changes. Subcortical nuclei and hippocampi are without significant atrophy.  T2/FLAIR: No Significant hyperintensities appreciated on MRI T2/FLAIR  DWI/ADC: No Significant DWI hyperintensities/hypointensities. No ADC correlation.  SWI/GRE: No Significant hypointensities to suggest  cortical/subcortical hemosiderin deposition.  Impression: : Normal Brain Imaging.    CT brain/head without contrast on 01/01/2019  Formal interpretation by Radiology:  not available  Independently reviewed radiological imaging by Suraj Ravi MD. MPH. Behavioral Neurologist  Impression: : No significant cortical atrophy with age-appropriate changes. Subcortical nuclei and hippocampi are without significant atrophy.     Procedures:    Electrocardiogram on 12/16/2021  Formal interpretation:  Vent. Rate : 067 BPM     Atrial Rate : 067 BPM    P-R Int : 148 ms          QRS Dur : 084 ms     QT Int : 398 ms       P-R-T Axes : 061 043 060 degrees    QTc Int : 420 ms Normal sinus rhythm Possible Left atrial enlargement Nonspecific ST and/or T wave abnormalities Abnormal ECG  Independently reviewed Electrocardiogram by Suraj Ravi MD. MPH. Behavioral Neurologist  Impression: : Received ECG has no evidence of sinus node disease. HR (>=50-60). Prolonged IA interval (>0.22 s). Broad QRS complex (> 0.12 s).     Clinical Summary:     The patient is a 68-year-old right-handed female with a relevant past medical history of Breast CA s/p B mastectomy on letrozole, Depression/Anxiety, who presents reporting a 3-year history of gradually progressive neurocognitive impairment.       The clinical history is suggestive of:  Executive Impairment: Working Memory  Language Impairment: Energization  Psychiatric Impairment: Neurovegetative, Social Coherence, Signal-Noise Dysregulation, Mood Regulation, Stimulation Dysregulation  The neurological examination is significant for:  Normal Neurological Examination  The neurocognitive battery is significant (based on age and education) for:  Subjective Cognitive impairment - Questionable Visuospatial Impairment: visuospatial construction.  BEHAV5+ 2/6: See ROS section for a full description  The neurologically relevant imaging is significant for  MRI brain/head without contrast  (10/04/2021): Normal Brain Imaging.  MRI brain/head without contrast (9/6/2012): Normal Brain Imaging.  CT brain/head without contrast (01/01/2019): No significant cortical atrophy with age-appropriate changes. Subcortical nuclei and hippocampi are without significant atrophy.        Assessment:        The patient's clinical presentation is amnestic predominant subjective cognitive impairment insufficient to interfere with activities of daily living (CDR-SOB: 0.5 - Questionable cognitive impairment).     The pathology underlying The patient's cognitive impairment is unclear at this time. The neurobehavioral effects of aromatase inhibitors (AI) likely depend upon when during the lifespan such drugs are administered. Post-menopausal women treated with such drugs are more likely to show memory deficits, increased pain response, and other emotional responses. That said, while great effort has been invested in studying cognitive dysfunction and AI therapy, controlling confounders such as prior chemotherapy received and having breast cancer is necessary to fully determine whether any association exists. Cerebrospinal Fluid Neurodegenerative Biomarkers (06/15/2022): Phospho-Tau(181P): 13 ( 7 pg/mL)  Total-Tau: 169 (1026 pg/mL)  p-Tau/Abeta42: 0. 015 ( 023 ratio)   Shows no active neuro degeneration however the there is a lower than expected abeta42 potentially suggesting early stages of amyloid deposition and prodromal ADRP. INVITAE NEGATIVE - Sequence analysis and deletion/duplication testing of the 57 genes listed in the Genes Analyzed section. [facility] Hereditary Parkinson Disease and Parkinsonism Panel [facility] Hereditary Amyotrophic Lateral Sclerosis, Frontotemporal Dementia and Alzheimer Disease Panel.    The observations made above, were discussed with the patient. We have discussed the additional diagnostic(s) and/or managenent below.     Prior Authorization Statement(s) Suvorexant (as well as other orexin  inhibitors) is extremely effective, well-tolerated in those over 65 with minimal adverse reactions in clinical trials, and non-addictive. In clinical trials it was shown to improve total sleep time, sleep latency, waking after sleep onset, and quality of sleep, and the only adverse reaction that clearly  from placebo was morning grogginess (Suvorexant 7%, placebo 3%). If insurance requires a prior authorization, guidelines established by the American Academy of Sleep Medicine (AASM) should be sufficient justification for using Suvorexant. Per the AASM guidelines (Mike et al, J Clin Sleep Med, 2017;13(2):307-349), Suvorexant is the most reasonable choice for the treatment of the insomnia in those over 65, particularly in the setting of cognitive impairment. Benzodiazepines (e.g., Triazolam, Temazepam), Z drugs (e.g., Zolpidem, Eszopiclone), and anticholinergic medications (e.g., Doxepin) are not recommended to treat insomnia in those over 65 and are listed on the AGS Beers Criteria (AGS Beers Criteria Update Expert Panel, J Am Geriatr Soc, 2019;67(4):674-694). These medications increase the risk for falls and worsen cognition. Other medications, including Zaleplon and Ramelteon are only indicated for sleep-onset insomnia. Trazodone is not recommended by AASM for the treatment of insomnia.     Care Management Plan:    #Optimize Neurocognitive Impairment and Quality  We have discussed the MIND Diet and other lifestyle behavior that may help maintain brain health.  We have provided written/digital reading material  No indication for donepezil at this time.  #Optimize Behavioral Management and Quality.  No indication for memantine at this time  #Optimize Sleep Hygiene and Quality  We discussed and recommended additional diagnostic/management of sleep disorder to optimize brain health and longevity.  Continue Belsomra 10 mg at night.  Continue CPAP  #Behavioral/Environmental Strategies  We recommend engaging in  activities that stimulate cognitively and socially while avoiding excessive stimulation and fatigue in overwhelmingly complex situations.  We recommend integrating routine and schedule into your daily life. https://www.alzheimersproject.org/news/the-importance-of-routine-and-familiarity-to-persons-with-dementia/  #Health Maintenance/Lifestyle Advice  We have discussed the value in aggressively controlling vascular risk factors like hypertension, hyperlipidemia, and Diabetes SBP<130, LDL<100, A1C<7.0.  We discussed the need to optimize lifestyle choices including a heart-healthy diet (e.g., Mediterranean or DASH), increased cardiovascular exercise (goal 150 minutes of moderate-intensity per week), and stay cognitively and socially active.  #Support  We all need support sometimes. Get easy access to local resources, community programs, and services. https://www.communityresourcefinder.org/  Learn more about Cognitive Impairment in [place]: https://www.alz.org/professionals/public-health/state-overview[place]  #Follow up:  Follow-up as needed.    Thank you for allowing us to participate in the care of your patient. Please do not hesitate to contact us with any questions or concerns.     It was a pleasure seeing The patient and we look forward to seeing them at their follow-up visit.     This note is dictated on M*Modal Fluency Direct word recognition program. There are word recognition mistakes that are occasionally missed on review.         Scheduled Follow-up :  Future Appointments   Date Time Provider Department Center   4/27/2023  1:30 PM PRE-ADMIT, ENDO -[facility] ENDOPP[facility]       After Visit Medication List :     Medication List            Accurate as of February 14, 2023  2:46 PM. If you have any questions, ask your nurse or doctor.                CONTINUE taking these medications      acetaminophen 650 MG Tbsr  Commonly known as: TYLENOL     ALPRAZolam 0.25 MG tablet  Commonly  known as: XANAX  Take 1 tablet (0.25 mg total) by mouth every evening.     BELSOMRA 20 mg Tab  Generic drug: suvorexant  TAKE 1/2 TABLET BY MOUTH EVERY EVENING     cholecalciferol (vitamin D3) 125 mcg (5,000 unit) capsule  Take 1 capsule (5,000 Units total) by mouth once daily.     losartan 25 MG tablet  Commonly known as: COZAAR  Take 1 tablet (25 mg total) by mouth once daily.     rosuvastatin 20 MG tablet  Commonly known as: CRESTOR  Take 1 tablet (20 mg total) by mouth once daily.     venlafaxine 75 MG 24 hr capsule  Commonly known as: EFFEXOR-XR  Take 2 capsules (150 mg total) by mouth once daily.     vitamin B complex-folic acid 0.4 mg Tab  Take 1 tablet by mouth once daily.              Signing Physician:  Suraj Obrien MD    Billing:          -----------------------------------------------------------------------------    I performed this consultation using real-time Telehealth tools, including a live video connection between my location and the patient's location (their home within the [facility]). Prior to initiating the consultation, I obtained informed verbal consent to perform this consultation using Telehealth tools and answered all the questions about the Telehealth interaction. The participants understand that only a limited neurological exam and limited neuropsychological testing can be performed using Telehealth tools.    I spent a total of 35 minutes (time-in: 14:35 PM; time-out: 15:10 PM) on 2023-02-14, virtually face-to-face with the patient and caregiver(s), >50% of that time was spent counseling regarding the symptoms, treatment plan, risks, therapeutic options, lifestyle modifications, and/or safety issues for the diagnoses above.    10/14 Review of Systems completed and is negative except as stated above in HPI (Systems reviewed: Const, Eyes, ENT, Resp, CV, GI, , MSK, Skin, Neuro)    I reviewed previous labs for a total of 10 minutes on 2023-02-14. This is directly related to the  face-to-face encounter. Review of previous labs was performed all negative except as stated above in HPI    I performed a neurobehavioral status examination that included a clinical assessment of thinking, reasoning, and judgment. Please see above HPI and ROS for full details. This exam was performed on 2023-02-14 and included 11 minutes spent on direct face-to-face clinical observation and interview with the patient and 21 minutes spent interpreting test results and preparing the report. The total time of 32 minutes spent on the neurobehavioral status examination is not included in the time spent on evaluation and management coding.    Total Billing time spent on encounter/documentation for this patient's evaluation and management, not including the neurobehavioral status examination: 34 minutes.

## 2023-04-27 ENCOUNTER — CLINICAL SUPPORT (OUTPATIENT)
Dept: ENDOSCOPY | Facility: HOSPITAL | Age: 68
End: 2023-04-27
Payer: COMMERCIAL

## 2023-04-27 VITALS — HEIGHT: 66 IN | WEIGHT: 160 LBS | BODY MASS INDEX: 25.71 KG/M2

## 2023-04-27 DIAGNOSIS — Z12.11 SPECIAL SCREENING FOR MALIGNANT NEOPLASMS, COLON: Primary | ICD-10-CM

## 2023-04-27 DIAGNOSIS — Z12.12 ENCOUNTER FOR COLORECTAL CANCER SCREENING: ICD-10-CM

## 2023-04-27 DIAGNOSIS — Z12.11 ENCOUNTER FOR COLORECTAL CANCER SCREENING: ICD-10-CM

## 2023-04-27 NOTE — PLAN OF CARE
Endoscopy procedure scheduled on 6/8/23, prep instructions reviewed, Pt verbalized understanding.

## 2023-05-16 ENCOUNTER — TELEPHONE (OUTPATIENT)
Dept: NEUROLOGY | Facility: CLINIC | Age: 68
End: 2023-05-16
Payer: MEDICARE

## 2023-05-16 NOTE — TELEPHONE ENCOUNTER
----- Message from Linda Pederson sent at 5/16/2023  1:58 PM CDT -----  Regarding: Prescription Information  Contact: [phone]  Calling in regards to rx suvorexant (BELSOMRA) 20 mg Tab. Pt has questions on price of prescription and if the mg is correct. Please call to discuss as soon as possible

## 2023-05-17 NOTE — TELEPHONE ENCOUNTER
Returned call to pt and she will follow up with the pharmacy to see why the increase in the price of the medication from $80 to $160. She will call us back if their any anything we need to do on our end.

## 2023-06-07 ENCOUNTER — ANESTHESIA EVENT (OUTPATIENT)
Dept: ENDOSCOPY | Facility: HOSPITAL | Age: 68
End: 2023-06-07
Payer: MEDICARE

## 2023-06-08 ENCOUNTER — HOSPITAL ENCOUNTER (OUTPATIENT)
Facility: HOSPITAL | Age: 68
Discharge: HOME | End: 2023-06-08
Attending: COLON & RECTAL SURGERY | Admitting: COLON & RECTAL SURGERY
Payer: COMMERCIAL

## 2023-06-08 ENCOUNTER — ANESTHESIA (OUTPATIENT)
Dept: ENDOSCOPY | Facility: HOSPITAL | Age: 68
End: 2023-06-08
Payer: MEDICARE

## 2023-06-08 VITALS
BODY MASS INDEX: 25.71 KG/M2 | SYSTOLIC BLOOD PRESSURE: 126 MMHG | HEIGHT: 66 IN | HEART RATE: 57 BPM | RESPIRATION RATE: 16 BRPM | TEMPERATURE: 98 F | OXYGEN SATURATION: 98 % | WEIGHT: 160 LBS | DIASTOLIC BLOOD PRESSURE: 60 MMHG

## 2023-06-08 DIAGNOSIS — Z12.11 ENCOUNTER FOR SCREENING COLONOSCOPY: ICD-10-CM

## 2023-06-08 PROCEDURE — 45385 COLONOSCOPY W/LESION REMOVAL: CPT | Mod: PT | Performed by: COLON & RECTAL SURGERY

## 2023-06-08 PROCEDURE — 27201012 HC FORCEPS, HOT/COLD, DISP: Performed by: COLON & RECTAL SURGERY

## 2023-06-08 PROCEDURE — 37000008 HC ANESTHESIA 1ST 15 MINUTES: Performed by: COLON & RECTAL SURGERY

## 2023-06-08 PROCEDURE — 45380 PR COLONOSCOPY,BIOPSY: ICD-10-PCS | Mod: 59,PT,, | Performed by: COLON & RECTAL SURGERY

## 2023-06-08 PROCEDURE — 27201089 HC SNARE, DISP (ANY): Performed by: COLON & RECTAL SURGERY

## 2023-06-08 PROCEDURE — 63600175 PHARM REV CODE 636 W HCPCS: Performed by: NURSE ANESTHETIST, CERTIFIED REGISTERED

## 2023-06-08 PROCEDURE — 88305 TISSUE EXAM BY PATHOLOGIST: CPT | Performed by: PATHOLOGY

## 2023-06-08 PROCEDURE — 88305 TISSUE EXAM BY PATHOLOGIST: CPT | Mod: 26,,, | Performed by: PATHOLOGY

## 2023-06-08 PROCEDURE — 37000009 HC ANESTHESIA EA ADD 15 MINS: Performed by: COLON & RECTAL SURGERY

## 2023-06-08 PROCEDURE — E9220 PRA ENDO ANESTHESIA: ICD-10-PCS | Mod: PT,,, | Performed by: NURSE ANESTHETIST, CERTIFIED REGISTERED

## 2023-06-08 PROCEDURE — 45380 COLONOSCOPY AND BIOPSY: CPT | Mod: 59,PT,, | Performed by: COLON & RECTAL SURGERY

## 2023-06-08 PROCEDURE — 88305 TISSUE EXAM BY PATHOLOGIST: ICD-10-PCS | Mod: 26,,, | Performed by: PATHOLOGY

## 2023-06-08 PROCEDURE — 25000003 PHARM REV CODE 250: Performed by: NURSE ANESTHETIST, CERTIFIED REGISTERED

## 2023-06-08 PROCEDURE — 45385 PR COLONOSCOPY,REMV LESN,SNARE: ICD-10-PCS | Mod: PT,,, | Performed by: COLON & RECTAL SURGERY

## 2023-06-08 PROCEDURE — 45385 COLONOSCOPY W/LESION REMOVAL: CPT | Mod: PT,,, | Performed by: COLON & RECTAL SURGERY

## 2023-06-08 PROCEDURE — E9220 PRA ENDO ANESTHESIA: HCPCS | Mod: PT,,, | Performed by: NURSE ANESTHETIST, CERTIFIED REGISTERED

## 2023-06-08 PROCEDURE — 45380 COLONOSCOPY AND BIOPSY: CPT | Mod: 59,PT | Performed by: COLON & RECTAL SURGERY

## 2023-06-08 RX ORDER — LIDOCAINE HYDROCHLORIDE 20 MG/ML
INJECTION INTRAVENOUS
Status: DISCONTINUED | OUTPATIENT
Start: 2023-06-08 | End: 2023-06-08

## 2023-06-08 RX ORDER — SODIUM CHLORIDE 9 MG/ML
INJECTION, SOLUTION INTRAVENOUS CONTINUOUS
Status: DISCONTINUED | OUTPATIENT
Start: 2023-06-08 | End: 2023-06-08 | Stop reason: HOSPADM

## 2023-06-08 RX ORDER — PROPOFOL 10 MG/ML
VIAL (ML) INTRAVENOUS CONTINUOUS PRN
Status: DISCONTINUED | OUTPATIENT
Start: 2023-06-08 | End: 2023-06-08

## 2023-06-08 RX ORDER — PROPOFOL 10 MG/ML
VIAL (ML) INTRAVENOUS
Status: DISCONTINUED | OUTPATIENT
Start: 2023-06-08 | End: 2023-06-08

## 2023-06-08 RX ADMIN — LIDOCAINE HYDROCHLORIDE 50 MG: 20 INJECTION INTRAVENOUS at 08:06

## 2023-06-08 RX ADMIN — SODIUM CHLORIDE: 0.9 INJECTION, SOLUTION INTRAVENOUS at 07:06

## 2023-06-08 RX ADMIN — SODIUM CHLORIDE: 0.9 INJECTION, SOLUTION INTRAVENOUS at 08:06

## 2023-06-08 RX ADMIN — GLYCOPYRROLATE 0.1 MG: 0.2 INJECTION, SOLUTION INTRAMUSCULAR; INTRAVENOUS at 08:06

## 2023-06-08 RX ADMIN — PROPOFOL 100 MG: 10 INJECTION, EMULSION INTRAVENOUS at 08:06

## 2023-06-08 RX ADMIN — PROPOFOL 20 MG: 10 INJECTION, EMULSION INTRAVENOUS at 08:06

## 2023-06-08 RX ADMIN — Medication 160 MCG/KG/MIN: at 08:06

## 2023-06-08 NOTE — H&P
Endoscopy H&P    Procedure : Colonoscopy      asymptomatic screening exam  No rectal bleeding, change in bowel caliber, fever, chills or weight loss.  No family or personal hx of colon ca.    Had hemorrhoidectomy    Last scope 10 years ago      Past Medical History:   Diagnosis Date    Breast cancer 2017    Birads 4  Bilateral Mastectomy     Depression     Family history of breast cancer in sister     age 45,lumpectomy,XRT    H/O abnormal mammogram 10/30/2017    Birads 4    Mitral valve prolapse      Sedation Problems: NO    Past Surgical History:   Procedure Laterality Date    BILATERAL MASTECTOMY Bilateral 2017    BREAST LUMPECTOMY  2017    atypical ductal hyperplasia     BREAST RECONSTRUCTION Bilateral 3/2018 & 2018    CHOLECYSTECTOMY  2005    COLONOSCOPY      DILATION AND CURETTAGE OF UTERUS  1983    Missed Ab    EYE SURGERY      cataracts    HERNIA REPAIR      LIVER LOBECTOMY      REDUCTION OF BOTH BREASTS  2009    TONSILLECTOMY  1970    TUBAL LIGATION  1985    with last delivery     UMBILICAL HERNIA REPAIR  2006         Family History   Problem Relation Age of Onset    Breast cancer Sister 45        XRT    Cancer Sister     Heart attack Father     Alcohol abuse Father             Depression Father     Dementia Mother     Alzheimer's disease Mother     Arthritis Mother             Cancer Mother     Hearing loss Mother     Miscarriages / Stillbirths Mother     Breast cancer Paternal Aunt     Breast cancer Maternal Aunt     Alcohol abuse Brother             Kidney disease Brother     Alcohol abuse Sister     Alcohol abuse Brother     Alcohol abuse Brother     Alcohol abuse Brother             Drug abuse Brother     Kidney disease Brother     Miscarriages / Stillbirths Sister     Colon cancer Neg Hx     Ovarian cancer Neg Hx     Uterine cancer Neg Hx     Cervical cancer Neg Hx     Prostate cancer Neg Hx      Fam Hx of Sedation Problems: NO  Social History      Socioeconomic History    Marital status:    Tobacco Use    Smoking status: Never    Smokeless tobacco: Never   Substance and Sexual Activity    Alcohol use: Yes     Alcohol/week: 0.0 - 4.0 standard drinks     Comment: Social     Drug use: Yes     Types: Marijuana     Comment: [facility] gummies/medical marijuana    Sexual activity: Yes     Partners: Male     Birth control/protection: Post-menopausal, See Surgical Hx     Comment: tubal ligation 198       No current facility-administered medications on file prior to encounter.     Current Outpatient Medications on File Prior to Encounter   Medication Sig Dispense Refill    acetaminophen (TYLENOL) 650 MG TbSR       ALPRAZolam (XANAX) 0.25 MG tablet Take 1 tablet (0.25 mg total) by mouth every evening. 30 tablet 0    cholecalciferol, vitamin D3, 125 mcg (5,000 unit) capsule Take 1 capsule (5,000 Units total) by mouth once daily. 90 capsule 3    losartan (COZAAR) 25 MG tablet Take 1 tablet (25 mg total) by mouth once daily. 90 tablet 3    rosuvastatin (CRESTOR) 20 MG tablet Take 1 tablet (20 mg total) by mouth once daily. 90 tablet 3    suvorexant (BELSOMRA) 20 mg Tab Take 0.5 tablets by mouth at bedtime 30 tablet 5    venlafaxine (EFFEXOR-XR) 75 MG 24 hr capsule Take 2 capsules (150 mg total) by mouth once daily. 180 capsule 3    vitamin B complex-folic acid 0.4 mg Tab Take 1 tablet by mouth once daily. 90 tablet 3       Review of patient's allergies indicates:  No Known Allergies    Review of Systems -   Constitutional: no fever or chills, pain controlled   Respiratory: no cough or shortness of breath   Cardiovascular: no chest pain or palpitations   Gastrointestinal: no abdominal pain or vomiting   Genitourinary: no hematuria or dysuria   Hematologic/Lymphatic: no easy bruising or lymphadenopathy   Musculoskeletal: no arthralgias or myalgias   Neurological: no seizures or tremors     Physical Exam:    Gen: NAD   HEENT: NCAT, trachea midline  Pulm: Unlabored  Abd:  Soft, nttp. No rebound, guarding.   Extremities: no cyanosis or edema, or clubbing  Skin: Skin color, texture, turgor normal. No rashes or lesions    Deep Sedation: Mallampati Score per anesthesia     SedationPlan :Choice     ASA : II    Cscope    CHARLES Vasquez MD   General Surgery, PGY-5

## 2023-06-08 NOTE — PROVATION PATIENT INSTRUCTIONS
Discharge Summary/Instructions after an Endoscopic Procedure  Patient Name: Nguyen Ivan  Patient MRN: [id number]  Patient YOB: 1955 Thursday, June 8, 2023  Ozzy Dela Cruz MD  Dear patient,  As a result of recent federal legislation (The Federal Cures Act), you may   receive lab or pathology results from your procedure in your [facility]   account before your physician is able to contact you. Your physician or   their representative will relay the results to you with their   recommendations at their soonest availability.  Thank you,  RESTRICTIONS:  During your procedure today, you received medications for sedation.  These   medications may affect your judgment, balance and coordination.  Therefore,   for 24 hours, you have the following restrictions:   - DO NOT drive a car, operate machinery, make legal/financial decisions,   sign important papers or drink alcohol.    ACTIVITY:  Today: no heavy lifting, straining or running due to procedural   sedation/anesthesia.  The following day: return to full activity including work.  DIET:  Eat and drink normally unless instructed otherwise.     TREATMENT FOR COMMON SIDE EFFECTS:  - Mild abdominal pain, nausea, belching, bloating or excessive gas:  rest,   eat lightly and use a heating pad.  - Sore Throat: treat with throat lozenges and/or gargle with warm salt   water.  - Because air was used during the procedure, expelling large amounts of air   from your rectum or belching is normal.  - If a bowel prep was taken, you may not have a bowel movement for 1-3 days.    This is normal.  SYMPTOMS TO WATCH FOR AND REPORT TO YOUR PHYSICIAN:  1. Abdominal pain or bloating, other than gas cramps.  2. Chest pain.  3. Back pain.  4. Signs of infection such as: chills or fever occurring within 24 hours   after the procedure.  5. Rectal bleeding, which would show as bright red, maroon, or black stools.   (A tablespoon of blood from the rectum is not serious, especially if    hemorrhoids are present.)  6. Vomiting.  7. Weakness or dizziness.  GO DIRECTLY TO THE NEAREST EMERGENCY ROOM IF YOU HAVE ANY OF THE FOLLOWING:      Difficulty breathing              Chills and/or fever over 101 F   Persistent vomiting and/or vomiting blood   Severe abdominal pain   Severe chest pain   Black, tarry stools   Bleeding- more than one tablespoon   Any other symptom or condition that you feel may need urgent attention  Your doctor recommends these additional instructions:  If any biopsies were taken, your doctors clinic will contact you in 1 to 2   weeks with any results.  - Discharge patient to home.   - High fiber diet.   - Continue present medications.   - Await pathology results.   - Repeat colonoscopy date to be determined after pending pathology results   are reviewed for surveillance based on pathology results.   - Patient has a contact number available for emergencies.  The signs and   symptoms of potential delayed complications were discussed with the   patient.  Return to normal activities tomorrow.  Written discharge   instructions were provided to the patient.  For questions, problems or results please call your physician - Ozzy Dela Cruz MD at Work:  [phone].  [facility], EMERGENCY ROOM PHONE NUMBER: [phone]  IF A COMPLICATION OR EMERGENCY SITUATION ARISES AND YOU ARE UNABLE TO REACH   YOUR PHYSICIAN - GO DIRECTLY TO THE EMERGENCY ROOM.  Ozzy Dela Cruz MD  6/8/2023 8:57:41 AM  This report has been verified and signed electronically.  Dear patient,  As a result of recent federal legislation (The Federal Cures Act), you may   receive lab or pathology results from your procedure in your [facility]   account before your physician is able to contact you. Your physician or   their representative will relay the results to you with their   recommendations at their soonest availability.  Thank you,  PROVATION

## 2023-06-08 NOTE — ANESTHESIA POSTPROCEDURE EVALUATION
Anesthesia Post Evaluation    Patient: Nguyen Ivan    Procedure(s) Performed: Procedure(s) (LRB):  COLONOSCOPY (N/A)    Final Anesthesia Type: general      Patient location during evaluation: GI PACU  Patient participation: Yes- Able to Participate  Level of consciousness: awake and alert and oriented  Post-procedure vital signs: reviewed and stable  Pain management: adequate  Airway patency: patent    PONV status at discharge: No PONV  Anesthetic complications: no      Cardiovascular status: stable  Respiratory status: unassisted, spontaneous ventilation and room air  Hydration status: euvolemic  Follow-up not needed.          Vitals Value Taken Time   /56 06/08/23 0913   Temp 36.7 °C (98.1 °F) 06/08/23 0900   Pulse 63 06/08/23 0913   Resp 16 06/08/23 0913   SpO2 98 % 06/08/23 0913         No case tracking events are documented in the log.      Pain/Edouard Score: Edouard Score: 10 (6/8/2023  9:13 AM)

## 2023-06-08 NOTE — ANESTHESIA PREPROCEDURE EVALUATION
06/08/2023  Nguyen Ivan is a 68 y.o., female.    Past Medical History:   Diagnosis Date    Breast cancer 12/2017    Birads 4  Bilateral Mastectomy     Depression     Family history of breast cancer in sister     age 45,lumpectomy,XRT    H/O abnormal mammogram 10/30/2017    Birads 4    Mitral valve prolapse      [phone] (home)     Pre-op Assessment    I have reviewed the Patient Summary Reports.     I have reviewed the Nursing Notes. I have reviewed the NPO Status.   I have reviewed the Medications.     Review of Systems  Anesthesia Hx:  No problems with previous Anesthesia  Denies Family Hx of Anesthesia complications.   Denies Personal Hx of Anesthesia complications.   Hematology/Oncology:  Hematology Normal   Oncology Normal     EENT/Dental:EENT/Dental Normal   Cardiovascular:   Hypertension    Pulmonary:  Pulmonary Normal    Renal/:  Renal/ Normal     Hepatic/GI:   Bowel Prep.    Musculoskeletal:  Musculoskeletal Normal    Neurological:  Neurology Normal    Endocrine:  Endocrine Normal    Dermatological:  Skin Normal    Psych:   Psychiatric History          Physical Exam  General: Well nourished    Airway:  Mallampati: II   Mouth Opening: Normal  TM Distance: Normal  Tongue: Normal  Neck ROM: Normal ROM    Dental:  Intact        Anesthesia Plan  Type of Anesthesia, risks & benefits discussed:    Anesthesia Type: Gen Natural Airway  Intra-op Monitoring Plan: Standard ASA Monitors  Informed Consent: Informed consent signed with the Patient and all parties understand the risks and agree with anesthesia plan.  All questions answered.   ASA Score: 2  Day of Surgery Review of History & Physical: H&P Update referred to the surgeon/provider.I have interviewed and examined the patient. I have reviewed the patient's H&P dated: There are no significant changes.     Ready For Surgery From  Anesthesia Perspective.     .

## 2023-06-08 NOTE — TRANSFER OF CARE
"Anesthesia Transfer of Care Note    Patient: Nguyen Ivan    Procedure(s) Performed: Procedure(s) (LRB):  COLONOSCOPY (N/A)    Patient location: GI    Anesthesia Type: general    Transport from OR: Transported from OR on room air with adequate spontaneous ventilation    Post pain: adequate analgesia    Post assessment: no apparent anesthetic complications    Post vital signs: stable    Level of consciousness: awake    Nausea/Vomiting: no nausea/vomiting    Complications: none    Transfer of care protocol was followed      Last vitals:   Visit Vitals  /63   Pulse 92   Temp 36.6 °C (97.9 °F)   Resp 17   Ht 5' 6" (1.676 m)   Wt 72.6 kg (160 lb)   LMP  (LMP Unknown)   SpO2 97%   Breastfeeding No   BMI 25.82 kg/m²     "

## 2023-06-12 LAB
FINAL PATHOLOGIC DIAGNOSIS: NORMAL
GROSS: NORMAL
Lab: NORMAL

## 2023-07-04 ENCOUNTER — TELEPHONE (OUTPATIENT)
Dept: INTERNAL MEDICINE | Facility: CLINIC | Age: 68
End: 2023-07-04
Payer: MEDICARE

## 2023-07-04 NOTE — TELEPHONE ENCOUNTER
No care due was identified.  [facility] Embedded Care Due Messages. Reference number: [id number].   7/04/2023 11:18:15 AM CDT

## 2023-07-05 RX ORDER — ALPRAZOLAM 0.25 MG/1
0.25 TABLET ORAL NIGHTLY
Qty: 30 TABLET | Refills: 0 | OUTPATIENT
Start: 2023-07-05

## 2023-07-31 ENCOUNTER — OFFICE VISIT (OUTPATIENT)
Dept: INTERNAL MEDICINE | Facility: CLINIC | Age: 68
End: 2023-07-31
Payer: COMMERCIAL

## 2023-07-31 DIAGNOSIS — K52.9 CHRONIC DIARRHEA: ICD-10-CM

## 2023-07-31 DIAGNOSIS — E04.1 THYROID NODULE: ICD-10-CM

## 2023-07-31 DIAGNOSIS — F51.01 PRIMARY INSOMNIA: ICD-10-CM

## 2023-07-31 DIAGNOSIS — F41.9 ANXIETY: ICD-10-CM

## 2023-07-31 DIAGNOSIS — F32.9 MAJOR DEPRESSION, CHRONIC: ICD-10-CM

## 2023-07-31 DIAGNOSIS — Z13.1 SCREENING FOR DIABETES MELLITUS: ICD-10-CM

## 2023-07-31 DIAGNOSIS — I70.0 AORTIC ATHEROSCLEROSIS: ICD-10-CM

## 2023-07-31 DIAGNOSIS — Z85.3 HISTORY OF BREAST CANCER: Primary | ICD-10-CM

## 2023-07-31 DIAGNOSIS — I10 PRIMARY HYPERTENSION: ICD-10-CM

## 2023-07-31 DIAGNOSIS — Z11.59 NEED FOR HEPATITIS C SCREENING TEST: ICD-10-CM

## 2023-07-31 DIAGNOSIS — E78.2 MIXED HYPERLIPIDEMIA: ICD-10-CM

## 2023-07-31 DIAGNOSIS — M85.89 OSTEOPENIA OF MULTIPLE SITES: ICD-10-CM

## 2023-07-31 PROBLEM — R91.1 LUNG NODULE: Status: RESOLVED | Noted: 2020-11-12 | Resolved: 2023-07-31

## 2023-07-31 PROCEDURE — 99213 PR OFFICE/OUTPT VISIT, EST, LEVL III, 20-29 MIN: ICD-10-PCS | Mod: 95,,, | Performed by: INTERNAL MEDICINE

## 2023-07-31 PROCEDURE — 99213 OFFICE O/P EST LOW 20 MIN: CPT | Mod: 95,,, | Performed by: INTERNAL MEDICINE

## 2023-07-31 RX ORDER — BUPROPION HYDROCHLORIDE 150 MG/1
150 TABLET ORAL DAILY
Qty: 90 TABLET | Refills: 3 | Status: SHIPPED | OUTPATIENT
Start: 2023-07-31 | End: 2024-07-30

## 2023-07-31 RX ORDER — ALPRAZOLAM 0.25 MG/1
0.25 TABLET ORAL NIGHTLY
Qty: 30 TABLET | Refills: 0 | Status: SHIPPED | OUTPATIENT
Start: 2023-07-31 | End: 2024-02-14 | Stop reason: SDUPTHER

## 2023-07-31 RX ORDER — TRAZODONE HYDROCHLORIDE 50 MG/1
50 TABLET ORAL NIGHTLY
Qty: 30 TABLET | Refills: 11 | Status: SHIPPED | OUTPATIENT
Start: 2023-07-31 | End: 2023-08-07 | Stop reason: SDUPTHER

## 2023-07-31 NOTE — Clinical Note
Please contact pt to schedule: 1. Fasting labs Tuesday morning at 10am at [facility] location. 2. F/u c me in 6 mos 3. Appt c GI: rice or Susie 4. Thyroid u/s appt.

## 2023-07-31 NOTE — PROGRESS NOTES
"INTERNAL MEDICINE ESTABLISHED PATIENT VISIT NOTE    Subjective:     Chief Complaint: Follow-up  HTN, HLD      The patient location is: Home  The chief complaint leading to consultation is: f/u HTN, HLD, depression  Visit type: audiovisual  Total time spent with patient: 25 minutes  Each patient to whom he or she provides medical services by telemedicine is:  (1) informed of the relationship between the physician and patient and the respective role of any other health care provider with respect to management of the patient; and (2) notified that he or she may decline to receive medical services by telemedicine and may withdraw from such care at any time.     Patient ID: Nguyen Ivan is a 68 y.o. female with HTN, HLD, Breast CA s/p B mastectomy 11/2017 and s/p breast reconstruction, fam hx breast CA, MVP (seen by Cards in the past at  and told it was no longer an issue, last echo many years ago), depression, hx liver lobectomy 2/2 "a growth on her liver" (path benign per pt and reports f/u imaging had been normal), baseline hearing impaired, chronic neck and back pain c hx herniated discs followed by Dr. Fairchild in the past but now seeing Dr. Hyman, last seen by me 11/2020, here today for f/u depression, HTN.    Has been followed by Dr. Alberta Hyman for issues c chronic low back pain.  Had MRI L spine and MRI L hip done in Oct which showed prior L3-5 posterior transpedicular fusion c hardware artifact, also c disc protrusion a t L1-L2 s spinal stenosis or neural foraminal narrowing.  Also c disc protrusion at L2-L3 c mild L neural foraminal narrowing.  Also c B neural foraminal narrowing at L5-S1, worse on L.    States she had a procedure done for her back in Jan and sx have improved.    Hx breast CA followed by Dr. Betty Meyer.  Had CT chest/abd done in Nov which showed a 7mm L thyroid hypodensity.  Also c a calcified soft tissue density in upper outer L breast 16 x 28 mm c surgical clips at this " site.  Lungs clear x scarring at base of lingula.  Aorta c minimal atheraomatous disease.    Repeat imaging c CT abd done in June which was unremarkable.    Today reports issues c depressed mood at times.  Has been on Effexor 150 mg daily which has helped overall but still feels down on some days.  States that certain things on the news will upset her (example war in [place], school shootings, etc).  Has Xanax prn but states 30 pills have lasted her a year as she tries not to take it.    Was seen by neuro for issues c memory/focus and was rx'ed Belsomra for sleep issues.  Also takes a marijuana gummy which helps.  States Belsomra is too expensive and would like to try something different.  On chart review, has tried Amitriptyline, Benadryl, and Lunesta without success.      Has also been having chronic GI issues with intermittent diarrhea several times a week for the past year so.  Of note, last TSH within normal limits.    Denies abdominal pain or nausea vomiting.  No significant changes in her weight.  Had colonoscopy done earlier this year with 3 polyps removed but no clear cause for her symptoms of diarrhea.    Past Medical History:  Past Medical History:   Diagnosis Date    Breast cancer 12/2017    Birads 4  Bilateral Mastectomy     Depression     Family history of breast cancer in sister     age 45,lumpectomy,XRT    H/O abnormal mammogram 10/30/2017    Birads 4    Mitral valve prolapse        Home Medications:  Prior to Admission medications    Medication Sig Start Date End Date Taking? Authorizing Provider   acetaminophen (TYLENOL) 650 MG TbSR  7/1/21   Historical Provider   ALPRAZolam (XANAX) 0.25 MG tablet Take 1 tablet (0.25 mg total) by mouth every evening. 3/24/22   Teresa Biswas MD   cholecalciferol, vitamin D3, 125 mcg (5,000 unit) capsule Take 1 capsule (5,000 Units total) by mouth once daily. 11/1/22 11/1/23  Sruaj Obrien MD   losartan (COZAAR) 25 MG tablet Take 1 tablet (25 mg total) by mouth once  daily. 2/7/23   Godwin Alexandre MD   rosuvastatin (CRESTOR) 20 MG tablet Take 1 tablet (20 mg total) by mouth once daily. 2/7/23   Godwin Alexandre MD   suvorexant (BELSOMRA) 20 mg Tab Take 0.5 tablets by mouth at bedtime 3/14/23   Suraj Obrien MD   venlafaxine (EFFEXOR-XR) 75 MG 24 hr capsule Take 2 capsules (150 mg total) by mouth once daily. 1/20/23   Lydia Reyes MD   vitamin B complex-folic acid 0.4 mg Tab Take 1 tablet by mouth once daily. 11/1/22 11/1/23  Suraj Obrien MD       Allergies:  Review of patient's allergies indicates:  No Known Allergies    Social History:  Social History     Tobacco Use    Smoking status: Never    Smokeless tobacco: Never   Substance Use Topics    Alcohol use: Yes     Alcohol/week: 0.0 - 4.0 standard drinks of alcohol     Comment: Social     Drug use: Yes     Types: Marijuana     Comment: [facility] gummies/medical marijuana        Review of Systems   Constitutional:  Negative for activity change, appetite change, chills, fatigue, fever and unexpected weight change.   HENT:  Negative for congestion, hearing loss, rhinorrhea and trouble swallowing.    Eyes:  Negative for pain, discharge and visual disturbance.   Respiratory:  Negative for cough, chest tightness, shortness of breath and wheezing.    Cardiovascular:  Negative for chest pain, palpitations and leg swelling.   Gastrointestinal:  Positive for diarrhea. Negative for abdominal distention, abdominal pain, blood in stool, constipation and vomiting.   Endocrine: Negative for polydipsia and polyuria.   Genitourinary:  Negative for decreased urine volume, difficulty urinating, dysuria, hematuria, menstrual problem and vaginal discharge.   Musculoskeletal:  Positive for arthralgias. Negative for joint swelling and neck pain.   Neurological:  Negative for weakness, numbness and headaches.   Psychiatric/Behavioral:  Positive for dysphoric mood. Negative for behavioral problems and confusion.          Health Maintenance:     Immunizations:    Influenza rec this Fall  TDap 5/2019  Prevnar 13 5/2019, rec pneumovax now.  Shingrix 1/2021, 4/2021  COVID 2/2021, 3/2021, 10/2021, 5/2022, 1/2023     Cancer Screening:  PAP: 8/2016 neg c neg HPV  Mammogram:  N/a, hx B mastectomy  Colonoscopy:  6/2023 3 polyps c/w tubular adenoma, rec repeat in 3 yrs per Dr. Dela Cruz  DEXA:  5/2019 osteopenia, repeat due, ordered previously but not completed by pt    Objective:   LMP  (LMP Unknown) Comment:   2003       General: AAO x3, no apparent distress      Labs:     Lab Results   Component Value Date    WBC 7.96 01/26/2023    HGB 13.5 01/26/2023    HCT 43.5 01/26/2023    MCV 95 01/26/2023     01/26/2023     Sodium   Date Value Ref Range Status   01/26/2023 143 136 - 145 mmol/L Final     Potassium   Date Value Ref Range Status   01/26/2023 4.4 3.5 - 5.1 mmol/L Final     Chloride   Date Value Ref Range Status   01/26/2023 108 95 - 110 mmol/L Final     CO2   Date Value Ref Range Status   01/26/2023 23 23 - 29 mmol/L Final     Glucose   Date Value Ref Range Status   01/26/2023 95 70 - 110 mg/dL Final     BUN   Date Value Ref Range Status   01/26/2023 16 8 - 23 mg/dL Final     Creatinine   Date Value Ref Range Status   01/26/2023 0.8 0.5 - 1.4 mg/dL Final     Calcium   Date Value Ref Range Status   01/26/2023 10.0 8.7 - 10.5 mg/dL Final     Total Protein   Date Value Ref Range Status   01/26/2023 7.2 6.0 - 8.4 g/dL Final     Albumin   Date Value Ref Range Status   01/26/2023 4.4 3.5 - 5.2 g/dL Final     Total Bilirubin   Date Value Ref Range Status   01/26/2023 1.0 0.1 - 1.0 mg/dL Final     Comment:     For infants and newborns, interpretation of results should be based  on gestational age, weight and in agreement with clinical  observations.    Premature Infant recommended reference ranges:  Up to 24 hours.............<8.0 mg/dL  Up to 48 hours............<12.0 mg/dL  3-5 days..................<15.0 mg/dL  6-29 days.................<15.0 mg/dL       Alkaline Phosphatase  "  Date Value Ref Range Status   01/26/2023 55 55 - 135 U/L Final     AST   Date Value Ref Range Status   01/26/2023 19 10 - 40 U/L Final     ALT   Date Value Ref Range Status   01/26/2023 19 10 - 44 U/L Final     Anion Gap   Date Value Ref Range Status   01/26/2023 12 8 - 16 mmol/L Final     eGFR if    Date Value Ref Range Status   12/16/2021 >60.0 >60 mL/min/1.73 m^2 Final     eGFR if non    Date Value Ref Range Status   12/16/2021 >60.0 >60 mL/min/1.73 m^2 Final     Comment:     Calculation used to obtain the estimated glomerular filtration  rate (eGFR) is the CKD-EPI equation.        No results found for: "LABA1C", "HGBA1C"  Lab Results   Component Value Date    LDLCALC 138.0 11/13/2020     Lab Results   Component Value Date    TSH 0.626 01/26/2023         Assessment/Plan     Nguyen was seen today for follow-up.    Diagnoses and all orders for this visit:    History of breast cancer  Followed by Dr. Meyer at   No acute issues, cont routine f/u    Primary hypertension  At goal on Losartan, cont meds    Mixed hyperlipidemia  Lab Results   Component Value Date    LDLCALC 138.0 11/13/2020      on last check, cont meds as per Cards  Fasting labs tomorrow.  -     Lipid Panel; Future    Aortic atherosclerosis  No acute issues, incidental finding on CT from   Cont bp and lipid control as above    Major depression, chronic  As per HPI  Improvement on Effexor but some recurrent depressive sx  Will add Wellbutrin as adjunct  Discussed avoiding watching the news as this tends to bring her down.  -     buPROPion (WELLBUTRIN XL) 150 MG TB24 tablet; Take 1 tablet (150 mg total) by mouth once daily.    Thyroid nodule  Noted on CT from .  We will check ultrasound to further evaluate.    -     US Soft Tissue Head Neck Thyroid; Future    Chronic diarrhea  As per HPI.  Symptoms present over the past year or so.  Unsure if possible IBS but would recommend additional evaluation with " Gastroenterology.  Recent TSH within normal limits.  Recent colonoscopy also within normal limits.  We will send for basic lab workup  -     Ambulatory referral/consult to Gastroenterology; Future  -     Comprehensive Metabolic Panel; Future  -     Celiac Disease Panel; Future    Osteopenia of multiple sites  Noted on last bone density scan.  Repeat due.  Currently scheduled for the beginning of August, advised to keep.      Screening for diabetes mellitus  -     Hemoglobin A1C; Future    Need for hepatitis C screening test  -     HEPATITIS C ANTIBODY; Future    Anxiety  Advised to continue Effexor and adding Wellbutrin as above.  Okay to refill Xanax with 30 pills to last 1 year.  Patient only takes when symptoms severe.       ALPRAZolam (XANAX) 0.25 MG tablet; Take 1 tablet (0.25 mg total) by mouth every evening.    Primary insomnia  As per HPI.  No improvement on amitriptyline, Benadryl, Lunesta  Belsomra too expensive.  We will try trazodone.    -     traZODone (DESYREL) 50 MG tablet; Take 1 tablet (50 mg total) by mouth every evening.      Return to clinic in 6 months for routine follow-up.  F[facility] labs tomorrow on [facility]

## 2023-08-01 ENCOUNTER — TELEPHONE (OUTPATIENT)
Dept: INTERNAL MEDICINE | Facility: CLINIC | Age: 68
End: 2023-08-01
Payer: MEDICARE

## 2023-08-01 ENCOUNTER — HOSPITAL ENCOUNTER (OUTPATIENT)
Dept: RADIOLOGY | Facility: HOSPITAL | Age: 68
Discharge: HOME | End: 2023-08-01
Attending: INTERNAL MEDICINE
Payer: MEDICARE

## 2023-08-01 ENCOUNTER — LAB VISIT (OUTPATIENT)
Dept: LAB | Facility: HOSPITAL | Age: 68
End: 2023-08-01
Attending: INTERNAL MEDICINE
Payer: MEDICARE

## 2023-08-01 DIAGNOSIS — Z78.0 MENOPAUSE: ICD-10-CM

## 2023-08-01 DIAGNOSIS — E78.2 MIXED HYPERLIPIDEMIA: ICD-10-CM

## 2023-08-01 DIAGNOSIS — Z13.1 SCREENING FOR DIABETES MELLITUS: ICD-10-CM

## 2023-08-01 DIAGNOSIS — K52.9 CHRONIC DIARRHEA: ICD-10-CM

## 2023-08-01 DIAGNOSIS — Z11.59 NEED FOR HEPATITIS C SCREENING TEST: ICD-10-CM

## 2023-08-01 LAB
ALBUMIN SERPL BCP-MCNC: 4.5 G/DL (ref 3.5–5.2)
ALP SERPL-CCNC: 58 U/L (ref 55–135)
ALT SERPL W/O P-5'-P-CCNC: 27 U/L (ref 10–44)
ANION GAP SERPL CALC-SCNC: 12 MMOL/L (ref 8–16)
AST SERPL-CCNC: 22 U/L (ref 10–40)
BILIRUB SERPL-MCNC: 1.2 MG/DL (ref 0.1–1)
BUN SERPL-MCNC: 14 MG/DL (ref 8–23)
CALCIUM SERPL-MCNC: 9.8 MG/DL (ref 8.7–10.5)
CHLORIDE SERPL-SCNC: 104 MMOL/L (ref 95–110)
CHOLEST SERPL-MCNC: 163 MG/DL (ref 120–199)
CHOLEST/HDLC SERPL: 2.4 {RATIO} (ref 2–5)
CO2 SERPL-SCNC: 23 MMOL/L (ref 23–29)
CREAT SERPL-MCNC: 0.8 MG/DL (ref 0.5–1.4)
EST. GFR  (NO RACE VARIABLE): >60 ML/MIN/1.73 M^2
ESTIMATED AVG GLUCOSE: 123 MG/DL (ref 68–131)
GLUCOSE SERPL-MCNC: 106 MG/DL (ref 70–110)
HBA1C MFR BLD: 5.9 % (ref 4–5.6)
HCV AB SERPL QL IA: NORMAL
HDLC SERPL-MCNC: 68 MG/DL (ref 40–75)
HDLC SERPL: 41.7 % (ref 20–50)
LDLC SERPL CALC-MCNC: 70 MG/DL (ref 63–159)
NONHDLC SERPL-MCNC: 95 MG/DL
POTASSIUM SERPL-SCNC: 4.1 MMOL/L (ref 3.5–5.1)
PROT SERPL-MCNC: 7.6 G/DL (ref 6–8.4)
SODIUM SERPL-SCNC: 139 MMOL/L (ref 136–145)
TRIGL SERPL-MCNC: 125 MG/DL (ref 30–150)

## 2023-08-01 PROCEDURE — 86803 HEPATITIS C AB TEST: CPT | Performed by: INTERNAL MEDICINE

## 2023-08-01 PROCEDURE — 36415 COLL VENOUS BLD VENIPUNCTURE: CPT | Mod: PO | Performed by: INTERNAL MEDICINE

## 2023-08-01 PROCEDURE — 80053 COMPREHEN METABOLIC PANEL: CPT | Performed by: INTERNAL MEDICINE

## 2023-08-01 PROCEDURE — 86364 TISS TRNSGLTMNASE EA IG CLAS: CPT | Performed by: INTERNAL MEDICINE

## 2023-08-01 PROCEDURE — 77080 DEXA BONE DENSITY SPINE HIP: ICD-10-PCS | Mod: 26,,, | Performed by: INTERNAL MEDICINE

## 2023-08-01 PROCEDURE — 77080 DXA BONE DENSITY AXIAL: CPT | Mod: TC

## 2023-08-01 PROCEDURE — 80061 LIPID PANEL: CPT | Performed by: INTERNAL MEDICINE

## 2023-08-01 PROCEDURE — 77080 DXA BONE DENSITY AXIAL: CPT | Mod: 26,,, | Performed by: INTERNAL MEDICINE

## 2023-08-01 PROCEDURE — 83036 HEMOGLOBIN GLYCOSYLATED A1C: CPT | Performed by: INTERNAL MEDICINE

## 2023-08-01 NOTE — TELEPHONE ENCOUNTER
----- Message from Teresa Biswas MD sent at 7/31/2023  4:32 PM CDT -----  Please contact pt to schedule:  1. Fasting labs Tuesday morning at 10am at [facility] location.  2. F/u c me in 6 mos  3. Appt c GI: rice or Susie  4. Thyroid u/s appt.

## 2023-08-03 LAB
GLIADIN PEPTIDE IGA SER-ACNC: 0.7 U/ML
GLIADIN PEPTIDE IGG SER-ACNC: 1 U/ML
IGA SERPL-MCNC: 127 MG/DL (ref 70–400)
TTG IGA SER-ACNC: <0.2 U/ML
TTG IGG SER-ACNC: <0.6 U/ML

## 2023-08-04 ENCOUNTER — HOSPITAL ENCOUNTER (OUTPATIENT)
Dept: RADIOLOGY | Facility: HOSPITAL | Age: 68
Discharge: HOME | End: 2023-08-04
Attending: INTERNAL MEDICINE
Payer: MEDICARE

## 2023-08-04 DIAGNOSIS — E04.1 THYROID NODULE: ICD-10-CM

## 2023-08-04 PROCEDURE — 76536 US EXAM OF HEAD AND NECK: CPT | Mod: TC

## 2023-08-04 PROCEDURE — 76536 US SOFT TISSUE HEAD NECK THYROID: ICD-10-PCS | Mod: 26,,, | Performed by: RADIOLOGY

## 2023-08-04 PROCEDURE — 76536 US EXAM OF HEAD AND NECK: CPT | Mod: 26,,, | Performed by: RADIOLOGY

## 2023-08-06 ENCOUNTER — PATIENT MESSAGE (OUTPATIENT)
Dept: INTERNAL MEDICINE | Facility: CLINIC | Age: 68
End: 2023-08-06
Payer: MEDICARE

## 2023-08-06 DIAGNOSIS — F51.01 PRIMARY INSOMNIA: ICD-10-CM

## 2023-08-07 ENCOUNTER — PATIENT MESSAGE (OUTPATIENT)
Dept: INTERNAL MEDICINE | Facility: CLINIC | Age: 68
End: 2023-08-07
Payer: MEDICARE

## 2023-08-07 ENCOUNTER — OFFICE VISIT (OUTPATIENT)
Dept: GASTROENTEROLOGY | Facility: CLINIC | Age: 68
End: 2023-08-07
Payer: MEDICARE

## 2023-08-07 VITALS — BODY MASS INDEX: 26.33 KG/M2 | WEIGHT: 163.13 LBS

## 2023-08-07 DIAGNOSIS — R19.8 ALTERNATING CONSTIPATION AND DIARRHEA: ICD-10-CM

## 2023-08-07 PROCEDURE — 99214 OFFICE O/P EST MOD 30 MIN: CPT | Mod: S$PBB,,,

## 2023-08-07 PROCEDURE — 99214 PR OFFICE/OUTPT VISIT, EST, LEVL IV, 30-39 MIN: ICD-10-PCS | Mod: S$PBB,,,

## 2023-08-07 PROCEDURE — 99999 PR PBB SHADOW E&M-EST. PATIENT-LVL IV: CPT | Mod: PBBFAC,,,

## 2023-08-07 PROCEDURE — 99214 OFFICE O/P EST MOD 30 MIN: CPT | Mod: PBBFAC,PO

## 2023-08-07 PROCEDURE — 99999 PR PBB SHADOW E&M-EST. PATIENT-LVL IV: ICD-10-PCS | Mod: PBBFAC,,,

## 2023-08-07 RX ORDER — TRAZODONE HYDROCHLORIDE 100 MG/1
100 TABLET ORAL NIGHTLY PRN
Qty: 30 TABLET | Refills: 11 | Status: SHIPPED | OUTPATIENT
Start: 2023-08-07 | End: 2024-01-10 | Stop reason: SDUPTHER

## 2023-08-07 NOTE — PATIENT INSTRUCTIONS
Complete mini miralax cleanout    Then start taking miralax daily or every other day           Clean Out Instructions;    Purchase a 4.1 oz bottle of Miralax (Generic ok).        Purchase a 64 oz bottle of Gatorade.        Mix them both together and drink it.    2.  If you still have not had a Bowel Movement then,       Purchase 1 bottle of Milk of Magnesia and drink.(12oz bottle, any flavor, Generic ok)      3.  You can take Linzess Daily.       Or take a Stool Softner AND 1/2 capful of Miralax every OTHER day.

## 2023-08-07 NOTE — PROGRESS NOTES
GASTROENTEROLOGY CLINIC NOTE    Reason for visit: The encounter diagnosis was Alternating constipation and diarrhea.  Referring provider/PCP: Teresa Biswas MD    HPI:  Nguyen Ivan is a 68 y.o. female here today for diarrhea, she reports this is ongoing for about a year. Typically goes about 4 days without having a BM, at that point will take colace to stimulate BM. Then the next day will have significant diarrhea. She then goes will go a few days without a BM. Associated abd bloating and discomfort during days without BM, relieved once she has a BM. Colonoscopy 2 months ago- few polyps removed, 3 year recall. No blood in stool. Celiac testing (-).     Prior Endoscopy:  EGD:  Colon: 2023 with Dr. Dela Cruz:    - Diverticulosis in the sigmoid colon.                - One 2 mm polyp in the cecum, removed with a jumbo cold forceps. Resected and retrieved.                - One 5 mm polyp in the descending colon, removed with a cold snare. Resected and retrieved.                - One 4 mm polyp in the sigmoid colon, removed with a cold snare. Resected and retrieved.     - Repeat colonoscopy in 3 years for surveillance.     Final Pathologic Diagnosis 1. CECUM, POLYPECTOMY:   Tubular adenoma     2. DESCENDING COLON, POLYPECTOMY:   Multiple fragments of tubular adenoma (s)     3. SIGMOID COLON, POLYPECTOMY:   Multiple fragments of tubular adenoma (s)        (Portions of this note were dictated using voice recognition software and may contain dictation related errors in spelling/grammar/syntax not found on text review)    Review of Systems   Gastrointestinal:  Positive for constipation and diarrhea. Negative for abdominal pain, blood in stool and melena.       Past Medical History: has a past medical history of Breast cancer, Depression, Family history of breast cancer in sister, H/O abnormal mammogram, and Mitral valve prolapse.    Past Surgical History: has a past surgical history that includes Liver lobectomy (2005);  Breast lumpectomy (2017); Reduction of both breasts (2009); Bilateral mastectomy (Bilateral, 12/2017); Breast reconstruction (Bilateral, 3/2018 & 5/2018); Tonsillectomy (08/1970); Dilation and curettage of uterus (1983); Tubal ligation (1985); Cholecystectomy (2005); Umbilical hernia repair (2006); Colonoscopy (2011); Eye surgery (2017); Hernia repair (2005); and Colonoscopy (N/A, 6/8/2023).    Home medications:   Current Outpatient Medications on File Prior to Visit   Medication Sig Dispense Refill    acetaminophen (TYLENOL) 650 MG TbSR       ALPRAZolam (XANAX) 0.25 MG tablet Take 1 tablet (0.25 mg total) by mouth every evening. 30 tablet 0    buPROPion (WELLBUTRIN XL) 150 MG TB24 tablet Take 1 tablet (150 mg total) by mouth once daily. 90 tablet 3    cholecalciferol, vitamin D3, 125 mcg (5,000 unit) capsule Take 1 capsule (5,000 Units total) by mouth once daily. 90 capsule 3    losartan (COZAAR) 25 MG tablet Take 1 tablet (25 mg total) by mouth once daily. 90 tablet 3    pneumoc 20-tammi conj-dip cr,PF, (PREVNAR-20, PF,) 0.5 mL Syrg injection Inject into the muscle. 0.5 mL 0    rosuvastatin (CRESTOR) 20 MG tablet Take 1 tablet (20 mg total) by mouth once daily. 90 tablet 3    suvorexant (BELSOMRA) 20 mg Tab Take 0.5 tablets by mouth at bedtime 30 tablet 5    traZODone (DESYREL) 100 MG tablet Take 1 tablet (100 mg total) by mouth nightly as needed for Insomnia. 30 tablet 11    venlafaxine (EFFEXOR-XR) 75 MG 24 hr capsule Take 2 capsules (150 mg total) by mouth once daily. 180 capsule 3    vitamin B complex-folic acid 0.4 mg Tab Take 1 tablet by mouth once daily. 90 tablet 3    [DISCONTINUED] traZODone (DESYREL) 50 MG tablet Take 1 tablet (50 mg total) by mouth every evening. 30 tablet 11     No current facility-administered medications on file prior to visit.       Vital signs:  Wt 74 kg (163 lb 2.3 oz)   LMP  (LMP Unknown) Comment:   2003  BMI 26.33 kg/m²     Physical Exam  Constitutional:       Appearance:  Normal appearance.   Abdominal:      General: Abdomen is flat. There is no distension.      Palpations: Abdomen is soft.      Tenderness: There is no abdominal tenderness.   Neurological:      Mental Status: She is alert.       I have reviewed associated labs, imaging and notes.     Assessment:  1. Alternating constipation and diarrhea    Ongoing for about a year    Goes about 4 days without BM, takes colace   Causes significant diarrhea, no blood/black stool   Then returns to constipation  Associated bloating   Relieved after having BM  Colonoscopy in June- 3 year repeat    Suspect diarrhea is overflow due to underlying constipation, need to start taking miralax on daily or near daily basis to regulate BM's.     Plan:     Complete mini miralax cleanout  Start taking miralax daily   Adjust as needed to achieve effective BM's  Consider low dose linzess if no relief     F/U as needed    Gisela Chou NP  [facility] [place]

## 2023-08-08 DIAGNOSIS — R73.03 PREDIABETES: Primary | ICD-10-CM

## 2023-08-09 ENCOUNTER — TELEPHONE (OUTPATIENT)
Dept: INTERNAL MEDICINE | Facility: CLINIC | Age: 68
End: 2023-08-09
Payer: MEDICARE

## 2023-08-09 NOTE — TELEPHONE ENCOUNTER
----- Message from Aparna Odonnell sent at 8/8/2023  4:31 PM CDT -----  Contact: [phone]  Pt is calling she states she had a call from the office please advise

## 2023-08-15 ENCOUNTER — TELEPHONE (OUTPATIENT)
Dept: INTERNAL MEDICINE | Facility: CLINIC | Age: 68
End: 2023-08-15
Payer: MEDICARE

## 2023-08-15 ENCOUNTER — PES CALL (OUTPATIENT)
Dept: ADMINISTRATIVE | Facility: CLINIC | Age: 68
End: 2023-08-15
Payer: MEDICARE

## 2023-08-15 NOTE — TELEPHONE ENCOUNTER
----- Message from Christina Black sent at 8/15/2023  4:20 PM CDT -----  Regarding: Questions about eAWV  Patient would like to know if PCP recommends her to completed the eAWV. Please call her at [phone]. She can schedule via my chart or call [phone].

## 2023-08-23 ENCOUNTER — PATIENT MESSAGE (OUTPATIENT)
Dept: ADMINISTRATIVE | Facility: OTHER | Age: 68
End: 2023-08-23
Payer: MEDICARE

## 2023-08-25 ENCOUNTER — PATIENT MESSAGE (OUTPATIENT)
Dept: ADMINISTRATIVE | Facility: OTHER | Age: 68
End: 2023-08-25
Payer: MEDICARE

## 2023-09-12 ENCOUNTER — E-VISIT (OUTPATIENT)
Dept: INTERNAL MEDICINE | Facility: CLINIC | Age: 68
End: 2023-09-12
Payer: COMMERCIAL

## 2023-09-12 DIAGNOSIS — N39.0 ACUTE UTI: Primary | ICD-10-CM

## 2023-09-12 PROCEDURE — 99421 PR E&M, ONLINE DIGIT, EST, < 7 DAYS, 5-10 MINS: ICD-10-PCS | Mod: ,,, | Performed by: PHYSICIAN ASSISTANT

## 2023-09-12 PROCEDURE — 99421 OL DIG E/M SVC 5-10 MIN: CPT | Mod: ,,, | Performed by: PHYSICIAN ASSISTANT

## 2023-09-12 RX ORDER — NITROFURANTOIN 25; 75 MG/1; MG/1
100 CAPSULE ORAL 2 TIMES DAILY
Qty: 10 CAPSULE | Refills: 0 | Status: SHIPPED | OUTPATIENT
Start: 2023-09-12 | End: 2023-09-17

## 2023-09-12 NOTE — PROGRESS NOTES
Patient ID: Nguyen Ivan is a 68 y.o. female.    Chief Complaint: Urinary Tract Infection (Entered automatically based on patient selection in Patient Portal.)    The patient initiated a request through [employer] on 9/12/2023 for evaluation and management with a chief complaint of Urinary Tract Infection (Entered automatically based on patient selection in Patient Portal.)     I evaluated the questionnaire submission on 9/12/2023.    Ohs Peq Evisit Uti Questionnaire    9/12/2023  9:30 AM CDT - Filed by Patient   Do you agree to participate in an E-Visit? Yes   If you have any of the following problems, please present to your local ER or call 911:  I acknowledge   What is the main issue that you would like for your doctor to address today? frequent urinating small amounts, burning sensation   Are you able to take your vital signs? Yes   Systolic Blood Pressure: 149   Diastolic Blood Pressure: 77   Weight: 160   Height: 66   Pulse: 62   Temperature: 98.1   Respiration rate:    Pulse Oxygen:    What symptoms do you currently have? Pain while passing urine;  Difficulty passing urine   When did your symptoms first appear? 9/8/2023   List what you have done or taken to help your symptoms. Extra strength tylenol and/or Motrin   Please indicate whether you have had the following symptoms during the past 24 hours     Urgent urination (a sudden and uncontrollable urge to urinate) Severe   Frequent urination of small amounts of urine (going to the toilet very often) Severe   Burning pain when urinating Moderate   Incomplete bladder emptying (still feel like you need to urinate again after urination) Severe   Pain not associated with urination in the lower abdomen below the belly button) None   What does your urine look like? I am not sure   Blood seen in the urine None   Flank pain (pain in one or both sides of the lower back) Mild   Abnormal Vaginal Discharge (abnormal amount, color and/or odor) None   Discharge from  the urethra (urinary opening) without urination None   High body temperature/fever? None-<99.5   Please rate how much discomfort you have experience because of the symptoms in the past 24 hours: Moderate   Please indicate how the symptoms have interfered with your every day activities/work in the past 24 hours: Moderate   Please indicate how these symptoms have interfered with your social activities (visiting people, meeting with friends, etc.) in the past 24 hours? Moderate   Are you a diabetic? No   Please indicate whether you have the following at the time of completion of this questionnaire: None of the above   Provide any information you feel is important to your history not asked above    Please attach any relevant images or files (if you have performed a home test for UTI, please submit a photo of results)          Recent Labs Obtained:  No visits with results within 7 Day(s) from this visit.   Latest known visit with results is:   Lab Visit on 08/01/2023   Component Date Value Ref Range Status    Sodium 08/01/2023 139  136 - 145 mmol/L Final    Potassium 08/01/2023 4.1  3.5 - 5.1 mmol/L Final    Chloride 08/01/2023 104  95 - 110 mmol/L Final    CO2 08/01/2023 23  23 - 29 mmol/L Final    Glucose 08/01/2023 106  70 - 110 mg/dL Final    BUN 08/01/2023 14  8 - 23 mg/dL Final    Creatinine 08/01/2023 0.8  0.5 - 1.4 mg/dL Final    Calcium 08/01/2023 9.8  8.7 - 10.5 mg/dL Final    Total Protein 08/01/2023 7.6  6.0 - 8.4 g/dL Final    Albumin 08/01/2023 4.5  3.5 - 5.2 g/dL Final    Total Bilirubin 08/01/2023 1.2 (H)  0.1 - 1.0 mg/dL Final    Comment: For infants and newborns, interpretation of results should be based  on gestational age, weight and in agreement with clinical  observations.    Premature Infant recommended reference ranges:  Up to 24 hours.............<8.0 mg/dL  Up to 48 hours............<12.0 mg/dL  3-5 days..................<15.0 mg/dL  6-29 days.................<15.0 mg/dL      Alkaline  Phosphatase 08/01/2023 58  55 - 135 U/L Final    AST 08/01/2023 22  10 - 40 U/L Final    ALT 08/01/2023 27  10 - 44 U/L Final    eGFR 08/01/2023 >60.0  >60 mL/min/1.73 m^2 Final    Anion Gap 08/01/2023 12  8 - 16 mmol/L Final    Hemoglobin A1C 08/01/2023 5.9 (H)  4.0 - 5.6 % Final    Comment: ADA Screening Guidelines:  5.7-6.4%  Consistent with prediabetes  >or=6.5%  Consistent with diabetes    High levels of fetal hemoglobin interfere with the HbA1C  assay. Heterozygous hemoglobin variants (HbS, HgC, etc)do  not significantly interfere with this assay.   However, presence of multiple variants may affect accuracy.      Estimated Avg Glucose 08/01/2023 123  68 - 131 mg/dL Final    Cholesterol 08/01/2023 163  120 - 199 mg/dL Final    Comment: The National Cholesterol Education Program (NCEP) has set the  following guidelines (reference ranges) for Cholesterol:  Optimal.....................<200 mg/dL  Borderline High.............200-239 mg/dL  High........................> or = 240 mg/dL      Triglycerides 08/01/2023 125  30 - 150 mg/dL Final    Comment: The National Cholesterol Education Program (NCEP) has set the  following guidelines (reference values) for triglycerides:  Normal......................<150 mg/dL  Borderline High.............150-199 mg/dL  High........................200-499 mg/dL      HDL 08/01/2023 68  40 - 75 mg/dL Final    Comment: The National Cholesterol Education Program (NCEP) has set the  following guidelines (reference values) for HDL Cholesterol:  Low...............<40 mg/dL  Optimal...........>60 mg/dL      LDL Cholesterol 08/01/2023 70.0  63.0 - 159.0 mg/dL Final    Comment: The National Cholesterol Education Program (NCEP) has set the  following guidelines (reference values) for LDL Cholesterol:  Optimal.......................<130 mg/dL  Borderline High...............130-159 mg/dL  High..........................160-189 mg/dL  Very High.....................>190 mg/dL      HDL/Cholesterol  Ratio 2023 41.7  20.0 - 50.0 % Final    Total Cholesterol/HDL Ratio 2023 2.4  2.0 - 5.0 Final    Non-HDL Cholesterol 2023 95  mg/dL Final    Comment: Risk category and Non-HDL cholesterol goals:  Coronary heart disease (CHD)or equivalent (10-year risk of CHD >20%):  Non-HDL cholesterol goal     <130 mg/dL  Two or more CHD risk factors and 10-year risk of CHD <= 20%:  Non-HDL cholesterol goal     <160 mg/dL  0 to 1 CHD risk factor:  Non-HDL cholesterol goal     <190 mg/dL      Antigliadin Abs, IgA 2023 0.7  <7.0 U/mL Final    INTERPRETATION: Negative    Antigliadin Ab IgG 2023 1.0  <7.0 U/mL Final    INTERPRETATION: Negative    TTG IgA 2023 <0.2  <7.0 U/mL Final    INTERPRETATION: Negative    TTG IgG 2023 <0.6  <7.0 U/mL Final    INTERPRETATION: Negative    Immunoglobulin A (IgA) 2023 127  70 - 400 mg/dL Final    Comment: Test performed at [facility],  [address] , [place] [id number] [phone]  Mariah Sarmiento MD, PhD - Medical Director      Hepatitis C Ab 2023 Non-reactive  Non-reactive Final       Encounter Diagnosis   Name Primary?    Acute UTI Yes        No orders of the defined types were placed in this encounter.           No follow-ups on file.      E-Visit Time Trackinm        Nguyen was seen today for urinary tract infection.    Diagnoses and all orders for this visit:    Acute UTI  -     nitrofurantoin, macrocrystal-monohydrate, (MACROBID) 100 MG capsule; Take 1 capsule (100 mg total) by mouth 2 (two) times daily. for 5 days      Clara Smith PA-C

## 2023-09-14 ENCOUNTER — OFFICE VISIT (OUTPATIENT)
Dept: URGENT CARE | Facility: CLINIC | Age: 68
End: 2023-09-14
Payer: COMMERCIAL

## 2023-09-14 VITALS
BODY MASS INDEX: 26.2 KG/M2 | WEIGHT: 163 LBS | TEMPERATURE: 99 F | SYSTOLIC BLOOD PRESSURE: 133 MMHG | DIASTOLIC BLOOD PRESSURE: 84 MMHG | RESPIRATION RATE: 16 BRPM | HEIGHT: 66 IN | HEART RATE: 84 BPM | OXYGEN SATURATION: 95 %

## 2023-09-14 DIAGNOSIS — H60.90 OTITIS EXTERNA, UNSPECIFIED CHRONICITY, UNSPECIFIED LATERALITY, UNSPECIFIED TYPE: ICD-10-CM

## 2023-09-14 DIAGNOSIS — R31.9 HEMATURIA, UNSPECIFIED TYPE: ICD-10-CM

## 2023-09-14 DIAGNOSIS — R05.9 COUGH, UNSPECIFIED TYPE: ICD-10-CM

## 2023-09-14 DIAGNOSIS — R39.15 URGENCY OF URINATION: ICD-10-CM

## 2023-09-14 DIAGNOSIS — U07.1 COVID: ICD-10-CM

## 2023-09-14 DIAGNOSIS — R09.81 NASAL CONGESTION: Primary | ICD-10-CM

## 2023-09-14 DIAGNOSIS — K14.8 LESION OF TONGUE: ICD-10-CM

## 2023-09-14 DIAGNOSIS — U07.1 COVID-19 VIRUS DETECTED: ICD-10-CM

## 2023-09-14 LAB
BILIRUB UR QL STRIP: NEGATIVE
CTP QC/QA: YES
GLUCOSE UR QL STRIP: NEGATIVE
KETONES UR QL STRIP: NEGATIVE
LEUKOCYTE ESTERASE UR QL STRIP: NEGATIVE
PH, POC UA: 5.5 (ref 5–8)
POC BLOOD, URINE: POSITIVE
POC NITRATES, URINE: NEGATIVE
PROT UR QL STRIP: POSITIVE
SARS-COV-2 AG RESP QL IA.RAPID: POSITIVE
SP GR UR STRIP: 1.02 (ref 1–1.03)
UROBILINOGEN UR STRIP-ACNC: NORMAL (ref 0.1–1.1)

## 2023-09-14 PROCEDURE — 87811 SARS CORONAVIRUS 2 ANTIGEN POCT, MANUAL READ: ICD-10-PCS | Mod: QW,S$GLB,, | Performed by: FAMILY MEDICINE

## 2023-09-14 PROCEDURE — 81003 POCT URINALYSIS, DIPSTICK, AUTOMATED, W/O SCOPE: ICD-10-PCS | Mod: QW,S$GLB,, | Performed by: FAMILY MEDICINE

## 2023-09-14 PROCEDURE — 99214 OFFICE O/P EST MOD 30 MIN: CPT | Mod: S$GLB,,, | Performed by: FAMILY MEDICINE

## 2023-09-14 PROCEDURE — 81003 URINALYSIS AUTO W/O SCOPE: CPT | Mod: QW,S$GLB,, | Performed by: FAMILY MEDICINE

## 2023-09-14 PROCEDURE — 87811 SARS-COV-2 COVID19 W/OPTIC: CPT | Mod: QW,S$GLB,, | Performed by: FAMILY MEDICINE

## 2023-09-14 PROCEDURE — 87086 URINE CULTURE/COLONY COUNT: CPT | Performed by: FAMILY MEDICINE

## 2023-09-14 PROCEDURE — 99214 PR OFFICE/OUTPT VISIT, EST, LEVL IV, 30-39 MIN: ICD-10-PCS | Mod: S$GLB,,, | Performed by: FAMILY MEDICINE

## 2023-09-14 RX ORDER — NEOMYCIN SULFATE, POLYMYXIN B SULFATE AND HYDROCORTISONE 10; 3.5; 1 MG/ML; MG/ML; [USP'U]/ML
3 SUSPENSION/ DROPS AURICULAR (OTIC) 4 TIMES DAILY
Qty: 10 ML | Refills: 0 | Status: SHIPPED | OUTPATIENT
Start: 2023-09-14 | End: 2024-01-10

## 2023-09-14 RX ORDER — BENZONATATE 200 MG/1
200 CAPSULE ORAL 3 TIMES DAILY PRN
Qty: 30 CAPSULE | Refills: 0 | Status: SHIPPED | OUTPATIENT
Start: 2023-09-14 | End: 2023-09-24

## 2023-09-14 RX ORDER — IPRATROPIUM BROMIDE 21 UG/1
2 SPRAY, METERED NASAL EVERY 12 HOURS PRN
Qty: 30 ML | Refills: 0 | Status: SHIPPED | OUTPATIENT
Start: 2023-09-14 | End: 2024-01-10

## 2023-09-14 RX ORDER — NIRMATRELVIR AND RITONAVIR 300-100 MG
KIT ORAL
Qty: 30 TABLET | Refills: 0 | Status: SHIPPED | OUTPATIENT
Start: 2023-09-14 | End: 2023-11-13

## 2023-09-14 NOTE — PROGRESS NOTES
Subjective:      Patient ID: Nguyen Ivan is a 68 y.o. female.    Vitals:  vitals were not taken for this visit.     Chief Complaint: Cough (Pt is coming in today for a chief complaint of coughing (productive), post nasal drip, headache, congestion, feverish (99.5), and runny nose. She did at home covid test it was  so she would like to retest. ) and Urinary Tract Infection (*possible UTI experiencing urgency she did a virtual visit and has been on antibiotics for 3 days and wants to know if its clearing up.)    Pt is coming in today for a chief complaint of coughing (productive), post nasal drip, headache, congestion, feverish (99.5), and runny nose. She did at home covid test it was  so she would like to retest.     *possible UTI experiencing urgency she did a virtual visit and has been on antibiotics for 3 days and wants to know if its clearing up.    Cough  This is a new problem. Episode onset: 3 days ago. The problem has been gradually worsening. The problem occurs constantly. The cough is Productive of sputum. Associated symptoms include ear pain, a fever, headaches, nasal congestion and postnasal drip. Nothing aggravates the symptoms. She has tried OTC cough suppressant for the symptoms. The treatment provided no relief.   Urinary Tract Infection   This is a new problem. The current episode started in the past 7 days. The problem has been gradually worsening. The patient is experiencing no pain. There has been no fever. She is Sexually active. There is No history of pyelonephritis. Associated symptoms include hematuria and urgency. She has tried antibiotics for the symptoms. The treatment provided no relief. Her past medical history is significant for recurrent UTIs.     Constitution: Positive for fever.   HENT:  Positive for ear pain and postnasal drip.    Respiratory:  Positive for cough.    Genitourinary:  Positive for urgency and hematuria.   Skin:  Negative for erythema.    Neurological:  Positive for headaches.    Objective:     Physical Exam   Constitutional: She is oriented to person, place, and time.  Non-toxic appearance. She appears ill. No distress. normal  HENT:   Head: Normocephalic and atraumatic.   Ears:   Right Ear: Tympanic membrane and external ear normal. There is swelling. No no drainage.   Left Ear: Tympanic membrane and external ear normal. There is swelling. No no drainage.   Nose: Rhinorrhea and congestion present.   Mouth/Throat: Uvula is midline. Mucous membranes are moist. No oropharyngeal exudate or posterior oropharyngeal erythema. Oropharynx is clear.          Comments: Non-healing tongue lesion  Eyes: Conjunctivae are normal. Pupils are equal, round, and reactive to light. Extraocular movement intact   Neck: Neck supple. No neck rigidity present.   Cardiovascular: Normal rate, regular rhythm, normal heart sounds and normal pulses.   Pulmonary/Chest: Effort normal and breath sounds normal. No respiratory distress. She has no rhonchi.   Abdominal: Normal appearance and bowel sounds are normal. Soft. flat abdomen   Musculoskeletal: Normal range of motion.         General: No swelling or tenderness. Normal range of motion.   Neurological: no focal deficit. She is alert, oriented to person, place, and time and at baseline. No cranial nerve deficit.   Skin: Skin is warm and dry. Capillary refill takes less than 2 seconds. No erythema jaundice  Psychiatric: Her behavior is normal. Mood, judgment and thought content normal.     Assessment:     Plan:   1. Urgency of urination  - POCT Urinalysis, Dipstick, Automated, W/O Scope    2. Nasal congestion  - SARS Coronavirus 2 Antigen, POCT Manual Read  - ipratropium (ATROVENT) 21 mcg (0.03 %) nasal spray; 2 sprays by Each Nostril route every 12 (twelve) hours as needed for Rhinitis.  Dispense: 30 mL; Refill: 0    3. Lesion of tongue  - Ambulatory referral/consult to ENT    4. Hematuria, unspecified type  - CULTURE,  URINE    5. Cough, unspecified type  - benzonatate (TESSALON) 200 MG capsule; Take 1 capsule (200 mg total) by mouth 3 (three) times daily as needed.  Dispense: 30 capsule; Refill: 0    6. COVID  - nirmatrelvir-ritonavir (PAXLOVID) 300 mg (150 mg x 2)-100 mg copackaged tablets (EUA); Take 3 tablets by mouth 2 (two) times daily. Each dose contains 2 nirmatrelvir (pink tablets) and 1 ritonavir (white tablet). Take all 3 tablets together  Dispense: 30 tablet; Refill: 0    7. Otitis externa, unspecified chronicity, unspecified laterality, unspecified type  - neomycin-polymyxin-hydrocortisone (CORTISPORIN) 3.5-10,000-1 mg/mL-unit/mL-% otic suspension; Place 3 drops into both ears 4 (four) times daily.  Dispense: 10 mL; Refill: 0

## 2023-09-15 LAB — BACTERIA UR CULT: NORMAL

## 2023-09-19 ENCOUNTER — PATIENT MESSAGE (OUTPATIENT)
Dept: INTERNAL MEDICINE | Facility: CLINIC | Age: 68
End: 2023-09-19
Payer: MEDICARE

## 2023-09-19 DIAGNOSIS — R80.9 PROTEINURIA, UNSPECIFIED TYPE: Primary | ICD-10-CM

## 2023-09-22 ENCOUNTER — E-VISIT (OUTPATIENT)
Dept: INTERNAL MEDICINE | Facility: CLINIC | Age: 68
End: 2023-09-22
Payer: COMMERCIAL

## 2023-09-22 DIAGNOSIS — I10 PRIMARY HYPERTENSION: ICD-10-CM

## 2023-09-22 DIAGNOSIS — U07.1 COVID: Primary | ICD-10-CM

## 2023-09-22 DIAGNOSIS — E78.2 MIXED HYPERLIPIDEMIA: ICD-10-CM

## 2023-09-22 PROCEDURE — 99421 OL DIG E/M SVC 5-10 MIN: CPT | Mod: ,,, | Performed by: INTERNAL MEDICINE

## 2023-09-22 PROCEDURE — 99421 PR E&M, ONLINE DIGIT, EST, < 7 DAYS, 5-10 MINS: ICD-10-PCS | Mod: ,,, | Performed by: INTERNAL MEDICINE

## 2023-09-22 NOTE — PROGRESS NOTES
Patient ID: Nguyen Ivan is a 68 y.o. female.    Chief Complaint: URI (Entered automatically based on patient selection in Patient Portal.)    The patient initiated a request through [employer] on 9/22/2023 for evaluation and management with a chief complaint of URI (Entered automatically based on patient selection in Patient Portal.)     I evaluated the questionnaire submission on today 9/22/2023.    Ohs Peq Evisit Upper Respitatory/Cough Questionnaire    9/22/2023 12:04 PM CDT - Filed by Patient   Do you agree to participate in an E-Visit? Yes   If you have any of the following symptoms, please present to your local ER or call 911:  I acknowledge   What is the main issue that you would like for your doctor to address today? Pos Covid via [facility] 2wks ago, then a negative test a week+ later, gave it to my  and now i have tested positive again this morning.Do i ak for refills on the COVID meds [facility] Clara Lam prescribed for my previous COVID?  Thoughts?  [place]?   Are you able to take your vital signs? No   What symptoms do you currently have?  Cough;  Fatigue;  Muscle or body aches   Describe your cough: Productive (containing mucus)   Describe the mucus: Yellow   Have you ever smoked? I have never smoked   Have you had a fever? No   When did your symptoms first appear? 9/21/2023   In the last two weeks, have you been in close contact with someone who has COVID-19 or the Flu? Yes , Covid-19   In the last two weeks, have you worked or volunteered in a healthcare facility or as a ? Healthcare facilities include a hospital, medical or dental clinic, long-term care facility, or nursing home No   Do you live in a long-term care facility, nursing home, group home, or homeless shelter? No   List what you have done or taken to help your symptoms. rested, took over counter meds   How severe are your symptoms? Moderate   Have your symptoms improved since they first appeared? No change   Have  you taken an at home Covid test? Yes   What were the results? Positive   Have you taken a Flu test? No   Have you been fully vaccinated for COVID? (2 Pfizer, 2 Moderna or 1 Shashank & Sh[employer]ank vaccine injections) Yes   Have you received a booster? Yes   Have you recieved a Flu shot? No   Do you have transportation to get tested for COVID if it is indicated and ordered for you [facility] an [facility]? Yes   Provide any information you feel is important to your history not asked above    Please attach any relevant images or files          Recent Labs Obtained:  No visits with results within 7 Day(s) from this visit.   Latest known visit with results is:   Office Visit on 2023   Component Date Value Ref Range Status    POC Blood, Urine 2023 Positive (A)  Negative Final    5 RBC/uL    POC Bilirubin, Urine 2023 Negative  Negative Final    POC Urobilinogen, Urine 2023 Normal  0.1 - 1.1 Final    POC Ketones, Urine 2023 Negative  Negative Final    POC Protein, Urine 2023 Positive (A)  Negative Final    10 mg/dL    POC Nitrates, Urine 2023 Negative  Negative Final    POC Glucose, Urine 2023 Negative  Negative Final    pH, UA 2023 5.5  5 - 8 Final    POC Specific Gravity, Urine 2023 1.025  1.003 - 1.029 Final    POC Leukocytes, Urine 2023 Negative  Negative Final    SARS Coronavirus 2 Antigen 2023 Positive (A)  Negative Final     Acceptable 2023 Yes   Final    Urine Culture, Routine 2023 No significant growth   Final       Encounter Diagnoses   Name Primary?    COVID Yes    Primary hypertension     Mixed hyperlipidemia         No orders of the defined types were placed in this encounter.           No follow-ups on file.      E-Visit Time Trackin

## 2023-09-23 ENCOUNTER — PATIENT MESSAGE (OUTPATIENT)
Dept: OTOLARYNGOLOGY | Facility: CLINIC | Age: 68
End: 2023-09-23
Payer: MEDICARE

## 2023-10-03 ENCOUNTER — OFFICE VISIT (OUTPATIENT)
Dept: OTOLARYNGOLOGY | Facility: CLINIC | Age: 68
End: 2023-10-03
Payer: MEDICARE

## 2023-10-03 VITALS
WEIGHT: 157.44 LBS | HEART RATE: 79 BPM | DIASTOLIC BLOOD PRESSURE: 81 MMHG | HEIGHT: 66 IN | SYSTOLIC BLOOD PRESSURE: 121 MMHG | BODY MASS INDEX: 25.3 KG/M2

## 2023-10-03 DIAGNOSIS — H91.90 HEARING LOSS, UNSPECIFIED HEARING LOSS TYPE, UNSPECIFIED LATERALITY: Primary | ICD-10-CM

## 2023-10-03 PROCEDURE — 99999 PR PBB SHADOW E&M-EST. PATIENT-LVL IV: CPT | Mod: PBBFAC,,, | Performed by: STUDENT IN AN ORGANIZED HEALTH CARE EDUCATION/TRAINING PROGRAM

## 2023-10-03 PROCEDURE — 99202 OFFICE O/P NEW SF 15 MIN: CPT | Mod: S$PBB,,, | Performed by: STUDENT IN AN ORGANIZED HEALTH CARE EDUCATION/TRAINING PROGRAM

## 2023-10-03 PROCEDURE — 99214 OFFICE O/P EST MOD 30 MIN: CPT | Mod: PBBFAC | Performed by: STUDENT IN AN ORGANIZED HEALTH CARE EDUCATION/TRAINING PROGRAM

## 2023-10-03 PROCEDURE — 99999 PR PBB SHADOW E&M-EST. PATIENT-LVL IV: ICD-10-PCS | Mod: PBBFAC,,, | Performed by: STUDENT IN AN ORGANIZED HEALTH CARE EDUCATION/TRAINING PROGRAM

## 2023-10-03 PROCEDURE — 99202 PR OFFICE/OUTPT VISIT, NEW, LEVL II, 15-29 MIN: ICD-10-PCS | Mod: S$PBB,,, | Performed by: STUDENT IN AN ORGANIZED HEALTH CARE EDUCATION/TRAINING PROGRAM

## 2023-10-03 NOTE — PROGRESS NOTES
Note to patients: In accordance with the  Century Cures Act, patients are now granted immediate electronic access to their medical records. This note is primarily intended for communication among medical professionals. As a result, it may incorporate medical terminology, abbreviations, or language that could appear blunt or unfamiliar. If you have questions about this document, we encourage you to discuss it with your physician.      [facility]  [facility]    Patient: Nguyen Ivan    : 1955    MRN: [id number]  Primary Care Provider: Teresa Biswas MD  Referring Provider: Sukhjinder Toussaint   Date of Encounter: 10/03/2023    CC:   Chief Complaint   Patient presents with    Oral Evaluation       HPI:   Nguyen Ivan is a 68 y.o. female who presents with long-standing tongue abnormalities for years. She was getting COVID tested and was advised to see a specialist. She has noted some grooves in her tongue for a very long time with no recent changes. She regularly sees her dentist. She does experience occasional mouth sores - none currently, always go away, various parts of her mouth. No bleeding from the lesions or the tongue, occasional gum bleeding. This may be associated with a bridge that she has had >40 years. Patient denies vegan or vegetarian diet. No history of Celiac or other GI/absorption d/o.    She reports about a 7lb weight loss, more c/w anorexia, denies odynophagia, dysphagia, globus sensation, aspiration sx.    She did report a sore right neck mass that has reduced in size. She has some baseline dyspnea on exertion but no new shortness of breath.    Recent COVID infection, throat pain and neck pain resolving.    Patient denies enlarged lymph nodes outside of the head or neck, voice changes, cough, hemoptysis, or new or concerning skin lesions. Patient denies personal or family history of head and neck cancer. Patient denies history of radiation  exposure.     Patient is a retired  and professor.      SUBSTANCE USE:  Smoking: Never  Alcohol: 4u/week  Denies hookah, chewing tobacco, marijuana, betel nut, illicit drug, heavy cigar, vape use.    ALLERGIES:  Review of patient's allergies indicates:  No Known Allergies      MEDICATIONS:    Current Outpatient Medications:     acetaminophen (TYLENOL) 650 MG TbSR, , Disp: , Rfl:     ALPRAZolam (XANAX) 0.25 MG tablet, Take 1 tablet (0.25 mg total) by mouth every evening., Disp: 30 tablet, Rfl: 0    buPROPion (WELLBUTRIN XL) 150 MG TB24 tablet, Take 1 tablet (150 mg total) by mouth once daily., Disp: 90 tablet, Rfl: 3    cholecalciferol, vitamin D3, 125 mcg (5,000 unit) capsule, Take 1 capsule (5,000 Units total) by mouth once daily., Disp: 90 capsule, Rfl: 3    ipratropium (ATROVENT) 21 mcg (0.03 %) nasal spray, 2 sprays by Each Nostril route every 12 (twelve) hours as needed for Rhinitis., Disp: 30 mL, Rfl: 0    losartan (COZAAR) 25 MG tablet, Take 1 tablet (25 mg total) by mouth once daily., Disp: 90 tablet, Rfl: 3    neomycin-polymyxin-hydrocortisone (CORTISPORIN) 3.5-10,000-1 mg/mL-unit/mL-% otic suspension, Place 3 drops into both ears 4 (four) times daily., Disp: 10 mL, Rfl: 0    nirmatrelvir-ritonavir (PAXLOVID) 300 mg (150 mg x 2)-100 mg copackaged tablets (EUA), Take 3 tablets by mouth 2 (two) times daily. Each dose contains 2 nirmatrelvir (pink tablets) and 1 ritonavir (white tablet). Take all 3 tablets together, Disp: 30 tablet, Rfl: 0    pneumoc 20-tammi conj-dip cr,PF, (PREVNAR-20, PF,) 0.5 mL Syrg injection, Inject into the muscle., Disp: 0.5 mL, Rfl: 0    rosuvastatin (CRESTOR) 20 MG tablet, Take 1 tablet (20 mg total) by mouth once daily., Disp: 90 tablet, Rfl: 3    suvorexant (BELSOMRA) 20 mg Tab, Take 0.5 tablets by mouth at bedtime, Disp: 30 tablet, Rfl: 5    traZODone (DESYREL) 100 MG tablet, Take 1 tablet (100 mg total) by mouth nightly as needed for Insomnia., Disp: 30 tablet, Rfl:  11    venlafaxine (EFFEXOR-XR) 75 MG 24 hr capsule, Take 2 capsules (150 mg total) by mouth once daily., Disp: 180 capsule, Rfl: 3    vitamin B complex-folic acid 0.4 mg Tab, Take 1 tablet by mouth once daily., Disp: 90 tablet, Rfl: 3    carbamide peroxide (DEBROX) 6.5 % otic solution, Place 5 drops into the right ear 2 (two) times daily., Disp: 18 mL, Rfl: 0    PAST MEDICAL HISTORY:  Past Medical History:   Diagnosis Date    Breast cancer 2017    Birads 4  Bilateral Mastectomy     Depression     Family history of breast cancer in sister     age 45,lumpectomy,XRT    H/O abnormal mammogram 10/30/2017    Birads 4    Mitral valve prolapse         PAST SURGICAL HISTORY:  Past Surgical History:   Procedure Laterality Date    BILATERAL MASTECTOMY Bilateral 2017    BREAST LUMPECTOMY  2017    atypical ductal hyperplasia     BREAST RECONSTRUCTION Bilateral 3/2018 & 2018    CHOLECYSTECTOMY      COLONOSCOPY      COLONOSCOPY N/A 2023    Procedure: COLONOSCOPY;  Surgeon: Ozzy Dela Cruz MD;  Location: [facility] (4TH FLR);  Service: Colon and Rectal;  Laterality: N/A;   referred by Dawn Black, RADAMES/PEG/instr. to portal-st    DILATION AND CURETTAGE OF UTERUS      Missed Ab    EYE SURGERY      cataracts    HERNIA REPAIR      LIVER LOBECTOMY      REDUCTION OF BOTH BREASTS  2009    TONSILLECTOMY  1970    TUBAL LIGATION  1985    with last delivery     UMBILICAL HERNIA REPAIR          FAMILY HISTORY:  Family History   Problem Relation Age of Onset    Breast cancer Sister 45        XRT    Cancer Sister     Heart attack Father     Alcohol abuse Father             Depression Father     Dementia Mother     Alzheimer's disease Mother     Arthritis Mother             Cancer Mother     Hearing loss Mother     Miscarriages / Stillbirths Mother     Breast cancer Paternal Aunt     Breast cancer Maternal Aunt     Alcohol abuse Brother             Kidney disease Brother  "    Alcohol abuse Sister     Alcohol abuse Brother     Alcohol abuse Brother     Alcohol abuse Brother             Drug abuse Brother     Kidney disease Brother     Miscarriages / Stillbirths Sister     Colon cancer Neg Hx     Ovarian cancer Neg Hx     Uterine cancer Neg Hx     Cervical cancer Neg Hx     Prostate cancer Neg Hx        SOCIAL HISTORY:  Social History     Socioeconomic History    Marital status:    Tobacco Use    Smoking status: Never    Smokeless tobacco: Never   Substance and Sexual Activity    Alcohol use: Yes     Alcohol/week: 0.0 - 4.0 standard drinks of alcohol     Comment: Social     Drug use: Yes     Types: Marijuana     Comment: [facility] gummies/medical marijuana    Sexual activity: Yes     Partners: Male     Birth control/protection: Post-menopausal, See Surgical Hx     Comment: tubal ligation 198     See above substance history    REVIEW OF SYSTEMS:   Comprehensive review of systems was discussed with the patient.  It is positive only for the above complaints.    PHYSICAL EXAMINATION:  Blood pressure 121/81, pulse 79, height 5' 6" (1.676 m), weight 71.4 kg (157 lb 6.5 oz).  Constitutional: Non-toxic appearing.   Psychiatric: Appropriate mood and affect. Cooperative.  Voice: Non-dysphonic, speaking in full sentences.   Neurologic: Cranial nerves grossly intact, no focal deficits.  Head and face: Salivary glands are not enlarged. Face is symmetric. CN VII strength and sensation intact.  Skin: No concerning skin lesions.   Eyes: Vision grossly intact, bilateral extraocular movements intact  Ears: Bilateral pinna, mastoid, external ear canal normal. Hearing intact. Right ear canal with soft cerumen, small amount of normal TM visualized. Left canal widely patent with minimal cerumen, TM clear and normal.  Nose: External nose appears normal.   Lips: No ulcers or lesions  Oral cavity: Mucosa is pink and moist. Tongue with pronounced grooves in center, no ulcer or lesion, no deeper " component, no firm aspects. Buccal mucosa, gingiva, anterior tongue, floor of mouth, and hard palate appear normal. No leukoplakia, erythroplakia, ulceration, masses or lesions.  Oropharynx: Mucosa is pink and moist. Soft palate and base of tongue are normal. Posterior pharyngeal wall normal. Tonsils are normal. No lesions.  Neck: Soft and flat, no masses. Soft right level 2 LAD - small/shoddy LNs only. No palpable thyroid enlargement or nodules.  Respiratory: Chest expansion symmetric, no audible stridor or stertor. Breathing is unlabored. No active cough.    ASSESSMENT AND PLAN:  1. Hearing loss, unspecified hearing loss type, unspecified laterality         Nguyen Ivan is a 68 y.o. female presenting for evaluation of tongue lesions. Findings most c/w anatomic variation/fissured tongue. No findings concerning for geographic tongue or vitamin deficiency. Soft LAD consistent with resolving aerodigestive infection (COVID).  - Cerumen impaction on right along with reduced hearing - Debrox Rx given along with audiology referral  - Reassurance given regarding tongue and resolving neck tenderness  - Can followup as needed or if new symptoms develop    Orders Placed This Encounter   Procedures    Ambulatory referral/consult to Audiology      Patient encouraged to call with any questions, concerns, or new or worsening symptoms.     Follow up PRN

## 2023-10-10 ENCOUNTER — CLINICAL SUPPORT (OUTPATIENT)
Dept: AUDIOLOGY | Facility: CLINIC | Age: 68
End: 2023-10-10
Payer: COMMERCIAL

## 2023-10-10 DIAGNOSIS — H91.90 HEARING LOSS, UNSPECIFIED HEARING LOSS TYPE, UNSPECIFIED LATERALITY: ICD-10-CM

## 2023-10-10 DIAGNOSIS — H90.3 SENSORINEURAL HEARING LOSS, BILATERAL: Primary | ICD-10-CM

## 2023-10-10 PROCEDURE — 92567 TYMPANOMETRY: CPT | Mod: PBBFAC | Performed by: AUDIOLOGIST

## 2023-10-10 PROCEDURE — 99999 PR PBB SHADOW E&M-EST. PATIENT-LVL II: CPT | Mod: PBBFAC,,, | Performed by: AUDIOLOGIST

## 2023-10-10 PROCEDURE — 99999 PR PBB SHADOW E&M-EST. PATIENT-LVL II: ICD-10-PCS | Mod: PBBFAC,,, | Performed by: AUDIOLOGIST

## 2023-10-10 PROCEDURE — 92557 COMPREHENSIVE HEARING TEST: CPT | Mod: PBBFAC | Performed by: AUDIOLOGIST

## 2023-10-10 PROCEDURE — 99212 OFFICE O/P EST SF 10 MIN: CPT | Mod: PBBFAC | Performed by: AUDIOLOGIST

## 2023-10-10 NOTE — PROGRESS NOTES
Nguyen Ivan was seen today in the clinic for an audiologic evaluation.  Patient's main complaint was decreased hearing in both ears.  Mrs. Ivan reported she has worn behind-the-ear hearing aids before but lost one of her hearing aids while wearing a mask. She reported that she has trouble hearing, especially in background noise. She denied any otalgia. Mrs. Ivan reported she was recently seen by ENT at the [facility] who advised her to use debrox. The wax was not removed from the ear as the clinic that Dr. Springer is located is not equipped for cerumen removal.    Otoscopy revealed clear EAC with visible TM in the left ear and occluding cerumen in the right ear. Cerumen was removed without incident using an illuminated curette from the EAC of the right ear. TM was visible and clear following cerumen removal. It was recommended that Mrs. Ivan continue use of debrox and consider seeing ENT for removal if it worsens.    Tympanometry revealed Type A in the right ear and Type A in the left ear.     Audiogram results revealed a mild to severe SNHL in the right and left ear.      Speech reception thresholds were noted at 40 dB in the right ear and 40 dB in the left ear.    Speech discrimination scores were 96% in the right ear and 80% in the left ear.    Results were discussed in detail following exam. Bilateral hearing aids are recommended; we briefly discussed [facility] policies and procedures for purchasing hearing aids. Mrs. Ivan was given my information to call and schedule a hearing aid consultation if she wished to discuss further. She stated that she is not interested in behind the ear technology and wants something in the ear.     Recommendations:  Otologic evaluation  Annual audiogram  Hearing protection when in noise  Hearing aid consultation

## 2023-10-10 NOTE — Clinical Note
Calvin Springer,   See Mrs. Ivan's audiogram note attached. Let me know if you have any questions!  Best,  Alejandro Barnes, CCC-A Audiology Supervisor [email or web address]

## 2023-10-11 ENCOUNTER — TELEPHONE (OUTPATIENT)
Dept: NEUROLOGY | Facility: CLINIC | Age: 68
End: 2023-10-11
Payer: MEDICARE

## 2023-10-11 NOTE — TELEPHONE ENCOUNTER
----- Message from Janine Pinzon sent at 10/11/2023 10:55 AM CDT -----  Regarding: pt advise  Contact: [phone]  Pt calling to have her sister seen by Dr. Obrien, due to Dr. Bruno leaving. Pt sister name is Amanda Urbina. Pt wants to know how to help. Please give pt a call back..

## 2023-10-13 ENCOUNTER — LAB VISIT (OUTPATIENT)
Dept: LAB | Facility: HOSPITAL | Age: 68
End: 2023-10-13
Attending: INTERNAL MEDICINE
Payer: COMMERCIAL

## 2023-10-13 DIAGNOSIS — R80.9 PROTEINURIA, UNSPECIFIED TYPE: ICD-10-CM

## 2023-10-13 LAB
CREAT UR-MCNC: 189 MG/DL (ref 15–325)
PROT UR-MCNC: 9 MG/DL (ref 0–15)
PROT/CREAT UR: 0.05 MG/G{CREAT} (ref 0–0.2)

## 2023-10-13 PROCEDURE — 82570 ASSAY OF URINE CREATININE: CPT | Performed by: INTERNAL MEDICINE

## 2023-11-07 RX ORDER — CHOLECALCIFEROL (VITAMIN D3) 125 MCG
5000 CAPSULE ORAL
Qty: 90 CAPSULE | Refills: 3 | Status: SHIPPED | OUTPATIENT
Start: 2023-11-07

## 2023-11-10 ENCOUNTER — E-VISIT (OUTPATIENT)
Dept: INTERNAL MEDICINE | Facility: CLINIC | Age: 68
End: 2023-11-10
Payer: COMMERCIAL

## 2023-11-10 ENCOUNTER — TELEPHONE (OUTPATIENT)
Dept: INTERNAL MEDICINE | Facility: CLINIC | Age: 68
End: 2023-11-10

## 2023-11-10 DIAGNOSIS — M54.2 NECK PAIN: Primary | ICD-10-CM

## 2023-11-10 PROCEDURE — 99421 OL DIG E/M SVC 5-10 MIN: CPT | Mod: ,,, | Performed by: INTERNAL MEDICINE

## 2023-11-10 PROCEDURE — 99421 PR E&M, ONLINE DIGIT, EST, < 7 DAYS, 5-10 MINS: ICD-10-PCS | Mod: ,,, | Performed by: INTERNAL MEDICINE

## 2023-11-10 NOTE — TELEPHONE ENCOUNTER
----- Message from Dawn Black NP sent at 11/10/2023  3:50 PM CST -----  Please reach out to schedule cervical spine xrays   Thank you

## 2023-11-10 NOTE — PROGRESS NOTES
Patient ID: Nguyen Ivan is a 68 y.o. female.    Chief Complaint: Back Pain (Entered automatically based on patient selection in Patient Portal.)    The patient initiated a request through [employer] on 11/10/2023 for evaluation and management with a chief complaint of Back Pain (Entered automatically based on patient selection in Patient Portal.)     I evaluated the questionnaire submission on 11/10.    Has pain in her back behind her shoulders B.    [facility] Evisit Back Pain    11/10/2023  3:40 PM CST - Filed by Patient   Do you agree to participate in an E-Visit? Yes   If you have any of the following symptoms, please present to your local ER or call 911: I acknowledge   What is the main issue that you would like for your doctor to address today? intense pain in left shoulder , thru back of neck to the right shoulder.  It can be fine then if ii take off my blouse over my head, it hurts badly. Also when i reach too far in a painful position.  I need some x-rays to find out what is going on, please.   Are you able to take your vital signs? Yes   Systolic Blood Pressure:    Diastolic Blood Pressure:    Weight: 162   Height: 66   Pulse:    Temperature: 97.2   Respiration rate:    Pulse Oxygen:    Where are you having pain? Upper Back   Does the pain extend into your legs? No   How bad is the pain? The pain is severe   Did you have an injury that caused the pain? No, I cannot remember an injury   How long has the pain been present? More than 1 week but less then 4 weeks   Have you had back pain in the past? I have had back pain before, but this is markedly different   Please list any medications or treatments you have used for back pain and indicate if it was effective or not. Brandyn's pills; very low dose of Narco 1-2xs per week; Motrin   Do you have a fever? No, I do not have a fever   Do you have any of the following? Areas that are numb or have a strange sensation;  Fatigue;  Incontinence   What makes the pain  worse? None of the above   What makes the pain better? Lying in bed;  Prescription pain medicine;  Over the counter pain medicine   Have you ever been diagnosed with cancer?    Have you ever been diagnosed with degenerative disc disease (arthritis of the spine)? I am not sure   Have you ever been diagnosed with osteoporosis or any other bone weakness? No   Have you ever had surgery on your back or spine? Yes   What is your usual health status? I am active and can move normally   Provide any information you feel is important to your history not asked above my right shoulder gives me more pain than other parts   Please attach any relevant images or files          Recent Labs Obtained:  No visits with results within 7 Day(s) from this visit.   Latest known visit with results is:   Lab Visit on 10/13/2023   Component Date Value Ref Range Status    Protein, Urine Random 10/13/2023 9  0 - 15 mg/dL Final    Creatinine, Urine 10/13/2023 189.0  15.0 - 325.0 mg/dL Final    Prot/Creat Ratio, Urine 10/13/2023 0.05  0.00 - 0.20 Final       Encounter Diagnosis   Name Primary?    Neck pain Yes        Orders Placed This Encounter   Procedures    X-Ray Cervical Spine Complete 5 view     Standing Status:   Future     Number of Occurrences:   1     Standing Expiration Date:   11/10/2024     Order Specific Question:   May the Radiologist modify the order per protocol to meet the clinical needs of the patient?     Answer:   Yes     Order Specific Question:   Release to patient     Answer:   Immediate            Follow up if symptoms worsen or fail to improve.    Xrays c degen changes.  Will call pt and rec trial of NSAIDS and refer to back and spine clinic.    E-Visit Time Tracking:

## 2023-11-11 ENCOUNTER — HOSPITAL ENCOUNTER (OUTPATIENT)
Dept: RADIOLOGY | Facility: HOSPITAL | Age: 68
Discharge: HOME | End: 2023-11-11
Attending: NURSE PRACTITIONER
Payer: MEDICARE

## 2023-11-11 DIAGNOSIS — M54.2 NECK PAIN: ICD-10-CM

## 2023-11-11 PROCEDURE — 72050 X-RAY EXAM NECK SPINE 4/5VWS: CPT | Mod: TC

## 2023-11-11 PROCEDURE — 72050 X-RAY EXAM NECK SPINE 4/5VWS: CPT | Mod: 26,,, | Performed by: RADIOLOGY

## 2023-11-11 PROCEDURE — 72050 XR CERVICAL SPINE COMPLETE 5 VIEW: ICD-10-PCS | Mod: 26,,, | Performed by: RADIOLOGY

## 2023-11-13 ENCOUNTER — TELEPHONE (OUTPATIENT)
Dept: INTERNAL MEDICINE | Facility: CLINIC | Age: 68
End: 2023-11-13
Payer: MEDICARE

## 2023-11-13 DIAGNOSIS — M54.2 CHRONIC NECK PAIN: Primary | ICD-10-CM

## 2023-11-13 DIAGNOSIS — G89.29 CHRONIC NECK PAIN: Primary | ICD-10-CM

## 2023-11-13 RX ORDER — CYCLOBENZAPRINE HCL 5 MG
5 TABLET ORAL NIGHTLY PRN
Qty: 10 TABLET | Refills: 0 | Status: SHIPPED | OUTPATIENT
Start: 2023-11-13 | End: 2023-12-11

## 2023-11-13 RX ORDER — NAPROXEN 500 MG/1
500 TABLET ORAL 2 TIMES DAILY WITH MEALS
Qty: 10 TABLET | Refills: 0 | Status: SHIPPED | OUTPATIENT
Start: 2023-11-13 | End: 2023-11-18

## 2023-11-13 NOTE — TELEPHONE ENCOUNTER
Spoke to patient regarding recent issues with neck and shoulder pain.  States pain is located in her neck and into her right shoulder blade.  No symptoms of radiation down her arm, no numbness or tingling in her arms.  States that the muscles do feel tight.  Has tried over-the-counter Motrin without relief.  We will try a course of naproxen b.i.d. for 5 days as well as p.r.n. Flexeril.  We will also referred to physical therapy.    Patient will call to schedule an appointment with Dr. Hyman, who did her lower back surgery in the past.  Also recommended heat and massage therapy.

## 2023-11-13 NOTE — TELEPHONE ENCOUNTER
----- Message from Eileen Worthy sent at 11/13/2023  8:51 AM CST -----  Contact: Mobile  [phone]  Patient is returning a phone call.  Who left a message for the patient: Dr. Zulay Moore  Does patient know what this is regarding:  Dr. Zulay Moore  Would you like a call back, or a response through your [facility] portal?:   Call

## 2023-11-14 ENCOUNTER — CLINICAL SUPPORT (OUTPATIENT)
Dept: REHABILITATION | Facility: HOSPITAL | Age: 68
End: 2023-11-14
Attending: INTERNAL MEDICINE
Payer: MEDICARE

## 2023-11-14 DIAGNOSIS — G89.29 CHRONIC NECK PAIN: ICD-10-CM

## 2023-11-14 DIAGNOSIS — M54.2 NECK PAIN ON RIGHT SIDE: ICD-10-CM

## 2023-11-14 DIAGNOSIS — R29.3 BAD POSTURE: Primary | ICD-10-CM

## 2023-11-14 DIAGNOSIS — M89.9 SCAPULAR DYSFUNCTION: ICD-10-CM

## 2023-11-14 DIAGNOSIS — M54.2 CHRONIC NECK PAIN: ICD-10-CM

## 2023-11-14 DIAGNOSIS — M25.60 LIMITATION OF JOINT MOTION OF THORACIC SPINE: ICD-10-CM

## 2023-11-14 PROCEDURE — 97110 THERAPEUTIC EXERCISES: CPT | Mod: PO

## 2023-11-14 PROCEDURE — 97161 PT EVAL LOW COMPLEX 20 MIN: CPT | Mod: PO

## 2023-11-14 NOTE — PLAN OF CARE
[facility]   Physical Therapy Initial Evaluation      Name: Nguyen TOVAR [facility] Number: [id number]    Therapy Diagnosis:   Encounter Diagnoses   Name Primary?    Chronic neck pain     Bad posture Yes    Scapular dysfunction     Limitation of joint motion of thoracic spine     Neck pain on right side         Physician: Teresa Biswas MD    Physician Orders: PT Eval and Treat   Medical Diagnosis from Referral: Chronic neck pain   Evaluation Date: 11/14/2023  Authorization Period Expiration: 11/12/24  Plan of Care Expiration: 12/26/23  Progress Note Due: 12/14/23  Date of Surgery: none for neck, see chart for lumbar surgeries  Visit # / Visits authorized: 1/ 1   FOTO: 1/ 3    Precautions: Standard     Time In: 1:00 PM  Time Out: 1:44 PM  Total Billable Time: 44 minutes    Subjective     Date of onset:3-4 duration for neck and upper back pain (no NICKY)    History of current condition - Nguyen reports: she has a history of lumbar fusion and ablation (Jan 2022 and Feb 2023). She says that she now has upper back and neck pain that has been ongoing for 4 months, unsure why it began. It started in the right upper back and then was going up the neck. On and off, the pain will also spread to the left. No history of neck surgeries. No pain in the arms and denies numbness and tingling.    Falls: none    Imaging: Xray Cervical Spine  FINDINGS:  Reversal of the normal cervical lordosis.  Grade 1 anterolisthesis of C4 on C5.  Spondylitic spurring and disc narrowing at C5-6 and C6-7.  Upper cervical facet arthropathy is prominent.     Findings result in narrowing of the left C3-4 and C5-6 neural foramen and the right C6-7 neural foramen.     No prevertebral soft tissue swelling     Impression:     Degenerative changes as described       Prior Therapy: yes-after her back surgeries  Social History: lives with , 20 stairs   Occupation: retired  Prior Level of Function: independent  Current Level of  "Function: It is bothersome to raise the arms, to get dressed, it is getting harder to  the grandkids.     Pain:  Current 3/10, worst 8/10, best 3/10   Location: right side of the neck and shoulder  Description: Aching and Dull  Aggravating Factors: Lifting and reaching overhead, getting dressed, carrying,rotating the neck to the right, reaching to take clothes out of the back of the dryer  Easing Factors: heat     Patients goals: "to be able to move it freely""to be able to lift my grandchildren", "to not be in pain anymore"     Medical History:   Past Medical History:   Diagnosis Date    Breast cancer 12/2017    Birads 4  Bilateral Mastectomy     Depression     Family history of breast cancer in sister     age 45,lumpectomy,XRT    H/O abnormal mammogram 10/30/2017    Birads 4    Mitral valve prolapse        Surgical History:   Nguyen Ivan  has a past surgical history that includes Liver lobectomy (2005); Breast lumpectomy (2017); Reduction of both breasts (2009); Bilateral mastectomy (Bilateral, 12/2017); Breast reconstruction (Bilateral, 3/2018 & 5/2018); Tonsillectomy (08/1970); Dilation and curettage of uterus (1983); Tubal ligation (1985); Cholecystectomy (2005); Umbilical hernia repair (2006); Colonoscopy (2011); Eye surgery (2017); Hernia repair (2005); and Colonoscopy (N/A, 6/8/2023).    Medications:   Nguyen has a current medication list which includes the following prescription(s): acetaminophen, alprazolam, bupropion, carbamide peroxide, cholecalciferol (vitamin d3), cyclobenzaprine, ipratropium, losartan, naproxen, neomycin-polymyxin-hydrocortisone, rosuvastatin, trazodone, and venlafaxine.    Allergies:   Review of patient's allergies indicates:  No Known Allergies     Objective      Observation: enters clinic interdependently without assistive device      Posture: FHP and rounded shoulders     Cervical Range of Motion:     Degrees Pain   Flexion 50 Slight right shoulder pain   Extension " "45    pain R shoulder   Right Rotation 80     yes   Left Rotation 80 yes   Right Side Bending 35 Pain in the right shoulder blade   Left Side Bending 35 no      Shoulder Range of Motion:  WNL but pain at end range right shoulder flexion and right shoulder abduction           Upper Extremity Strength  (R) UE   (L) UE     Shoulder flexion: 4/5 Shoulder flexion: 4+/5   Shoulder Abduction: 4+/5 Shoulder abduction: 5/5   Shoulder ER 5/5 Shoulder ER 5/5   Shoulder IR 5/5 Shoulder IR 5/5   Elbow flexion: 5/5 Elbow flexion: 5/5   Elbow extension: 5/5 Elbow extension: 5/5    R lower trap and middle trap: 3+/5, left 4/5      Special Tests:  Distraction -   Compression -   Sharp-Nikunj -   Spurlings -        Joint Mobility: hypomobility in T spine     Palpation:TTP @ right suboccipital muscles, R cervical paraspinals, and R upper trap, levator scapulae, SCM and scalenes TTP at upper to mid thoracic spine and right sided thoracic paraspinals    Sensation: intact               Intake Outcome Measure for FOTO Neck Survey    Therapist reviewed FOTO scores for Nguyen Ivan on 11/14/2023.   FOTO report - see Media section or FOTO account episode details.    Intake Score: 48         Treatment     Total Treatment time (time-based codes) separate from Evaluation: 10 minutes     Nguyen received the treatments listed below:      therapeutic exercises to develop ROM and flexibility for 10 minutes including:  Open books x10 opening right  Thoracic extension over foam roll in supine  UT stretch 2x30"        Patient Education and Home Exercises     Education provided:   - PT role and POC  - HEP    Written Home Exercises Provided: yes. Exercises were reviewed and Nguyen was able to demonstrate them prior to the end of the session.  Nguyen demonstrated good  understanding of the education provided. See EMR under Patient Instructions for exercises provided during therapy sessions.    Assessment     Nguyen is a 68 y.o. female referred to " outpatient Physical Therapy with a medical diagnosis of  Chronic neck pain . Patient presents with right sided neck pain that began about 4 months ago insidiously. She presents with impaired cervicothoracic postural muscular endurance, impaired cervicothoracic mobility, and periscapular weakness impacting her ability for overhead lifting, dressing, and picking up her grandkids.  Patient prognosis is Good.   Patient will benefit from skilled outpatient Physical Therapy to address the deficits stated above and in the chart below, provide patient /family education, and to maximize patientt's level of independence.     Plan of care discussed with patient: Yes  Patient's spiritual, cultural and educational needs considered and patient is agreeable to the plan of care and goals as stated below:     Anticipated Barriers for therapy: none    Medical Necessity is demonstrated by the following  History  Co-morbidities and personal factors that may impact the plan of care [] LOW: no personal factors / co-morbidities  [x] MODERATE: 1-2 personal factors / co-morbidities  [] HIGH: 3+ personal factors / co-morbidities    Moderate / High Support Documentation:   Co-morbidities affecting plan of care: PmHx    Personal Factors:   no deficits     Examination  Body Structures and Functions, activity limitations and participation restrictions that may impact the plan of care [x] LOW: addressing 1-2 elements  [] MODERATE: 3+ elements  [] HIGH: 4+ elements (please support below)    Moderate / High Support Documentation:      Clinical Presentation [x] LOW: stable  [] MODERATE: Evolving  [] HIGH: Unstable     Decision Making/ Complexity Score: low       Goals:    Short Term Goals: 3 weeks  1. Pt to be independent in HEP to improve independence with symptoms.   2. Pt to require min VC from PT for proper scapular retraction in order to improve postural awareness.   3. Pt to improve periscapular muscles strength by 1/2 muscle grade to improve  scapular stability and improve lifting   4. Pt to report pain at worst to be </=4/10 in order to improve upon symptom management   5. Pt to report 50% improvement in dressing herself and carrying her grandchildren          Long Term Goals: 6 weeks   1. Pt to be independent in HEP progressions to improve independence with symptoms.   2. Pt to require no verbal or tactile from PT for proper scapular retraction in order to improve postural awareness.   3. Pt to improve periscapular muscles strength by 1 muscle grade to improve scapular stability and improve lifting  4. Pt to report pain at worst to be </=2/10 in order to improve upon symptom management and quality of life.  5. Pt to improve cervical AROM to WFL to improve tolerance to daily activities at home  6. Pt to report 75% improvement in dressing herself and carrying her grandchildren  7. Pt will report no pain with cervical AROM in all planes to promote QOL.       Plan     Plan of care Certification: 11/14/2023 to 12/26/23.    Outpatient Physical Therapy 2 times weekly for 6 weeks to include the following interventions: Manual Therapy, Moist Heat/ Ice, Neuromuscular Re-ed, Patient Education, Self Care, Therapeutic Activities, Therapeutic Exercise, and modalities as appropriate.     Camilla Landaverde, PT, DPT         Physician's Signature: _________________________________________ Date: ________________

## 2023-11-27 NOTE — PROGRESS NOTES
"[facility]   Physical Therapy Treatment Note      Name: Nguyen TOVAR [facility] Number: [id number]    Therapy Diagnosis:   Encounter Diagnoses   Name Primary?    Bad posture Yes    Scapular dysfunction     Limitation of joint motion of thoracic spine     Neck pain on right side      Physician: Teresa Biswas MD    Visit Date: 11/28/2023      Physician Orders: PT Eval and Treat   Medical Diagnosis from Referral: Chronic neck pain   Evaluation Date: 11/14/2023  Authorization Period Expiration: 11/12/24  Plan of Care Expiration: 12/26/23  Progress Note Due: 12/14/23  Date of Surgery: none for neck, see chart for lumbar surgeries  Visit # / Visits authorized: 1/ 1   FOTO: 1/ 3     Precautions: Standard      Time In: 3:00 PM  Time Out: 3:48 PM  Total Billable Time: 23 minutes one on one with PT (TE-1, NMR-1)    Subjective     Patient reports: pain in the right shoulder and neck. She is no longer taking her medication given by Dr. Biswas- has been off for a week  She  was semi  compliant with home exercise program.- did them twice since initial eval  Response to previous treatment: felt good  Functional change: in progress     Pain: 5-6/10- when putting on a shirt and lifting the arm, 1-2/10 at rest  Location: Right shoulder and neck    Objective      Objective Measures updated at progress report unless specified.     Treatment     Nguyen received the treatments listed below:      Patient was one on one with PT for 23 minutes.     therapeutic exercises to develop ROM and flexibility for 24 minutes including:  +supine shoulder flexion with 3# dowel 2x10   Open books 2x10 opening right  Thoracic extension over 1/2 foam roll in seated position 2x10 , 5" hold   UT stretch 3x30"- stretching right side   LS stretch 3x30" - stretching right side   +supine serratus punches 3x10         neuromuscular re-education activities to improve: Posture for 24 minutes. The following activities were included:  +No " "Monies RTB 2x10 5" holds  + chin tucks supine 3x10, 3" holds   +Rows GTB 3x10  +shoulder extension GTB 3x10  +seated scap squeeze with chin tuck 30x5" holds   Review of HEP     Patient Education and Home Exercises       Education provided:   - continue HEP/HEP compliance     Written Home Exercises Provided: yes. Exercises were reviewed and Nguyen was able to demonstrate them prior to the end of the session.  Nguyen demonstrated good  understanding of the education provided. See Electronic Medical Record under Patient Instructions for exercises provided during therapy sessions    Assessment     Session focused on cervicothoracic postural muscular endurance and cervicothoracic mobility. Patient complains of right sided neck and shoulder soreness but is able to complete all interventions. She requires cuing for proper scapular depression and retraction when performing no monies and theraband activities, and cuing for cervical retraction for chin tucks. Ms. Cedillo is educated on importance of proper postural control.  HEP is updated and reviewed to reflect new interventions and reviewed with patient, compliance is encouraged. Progress as tolerated.    Nguyen Is progressing well towards her goals.   Patient prognosis is Good.     Patient will continue to benefit from skilled outpatient physical therapy to address the deficits listed in the problem list box on initial evaluation, provide pt/family education and to maximize pt's level of independence in the home and community environment.     Patient's spiritual, cultural and educational needs considered and pt agreeable to plan of care and goals.     Anticipated barriers to physical therapy: none    Goals:     Short Term Goals: 3 weeks  1. Pt to be independent in HEP to improve independence with symptoms.   2. Pt to require min VC from PT for proper scapular retraction in order to improve postural awareness.   3. Pt to improve periscapular muscles strength by 1/2 muscle " grade to improve scapular stability and improve lifting   4. Pt to report pain at worst to be </=4/10 in order to improve upon symptom management   5. Pt to report 50% improvement in dressing herself and carrying her grandchildren           Long Term Goals: 6 weeks   1. Pt to be independent in HEP progressions to improve independence with symptoms.   2. Pt to require no verbal or tactile from PT for proper scapular retraction in order to improve postural awareness.   3. Pt to improve periscapular muscles strength by 1 muscle grade to improve scapular stability and improve lifting  4. Pt to report pain at worst to be </=2/10 in order to improve upon symptom management and quality of life.  5. Pt to improve cervical AROM to WFL to improve tolerance to daily activities at home  6. Pt to report 75% improvement in dressing herself and carrying her grandchildren  7. Pt will report no pain with cervical AROM in all planes to promote QOL.    Plan     Continue PT POC    Camilla Landaverde, PT

## 2023-11-28 ENCOUNTER — CLINICAL SUPPORT (OUTPATIENT)
Dept: REHABILITATION | Facility: HOSPITAL | Age: 68
End: 2023-11-28
Payer: MEDICARE

## 2023-11-28 DIAGNOSIS — M89.9 SCAPULAR DYSFUNCTION: ICD-10-CM

## 2023-11-28 DIAGNOSIS — R29.3 BAD POSTURE: Primary | ICD-10-CM

## 2023-11-28 DIAGNOSIS — M25.60 LIMITATION OF JOINT MOTION OF THORACIC SPINE: ICD-10-CM

## 2023-11-28 DIAGNOSIS — M54.2 NECK PAIN ON RIGHT SIDE: ICD-10-CM

## 2023-11-28 PROCEDURE — 97112 NEUROMUSCULAR REEDUCATION: CPT | Mod: PO

## 2023-11-28 PROCEDURE — 97110 THERAPEUTIC EXERCISES: CPT | Mod: PO

## 2023-12-01 ENCOUNTER — TELEPHONE (OUTPATIENT)
Dept: NEUROLOGY | Facility: CLINIC | Age: 68
End: 2023-12-01
Payer: MEDICARE

## 2023-12-01 NOTE — TELEPHONE ENCOUNTER
----- Message from Alice Samuels sent at 12/1/2023  9:13 AM CST -----  Type:  Appointment Request       Name of Caller:pt  When is the first available appointment?n/a  Symptoms:consistent loss of memory issues  Best Call Back Number: [phone]  Additional Information: via pt portal request My non-dementia diagnosis of 2 years ago does not seem to be valid any longer

## 2023-12-07 ENCOUNTER — LAB VISIT (OUTPATIENT)
Dept: LAB | Facility: HOSPITAL | Age: 68
End: 2023-12-07
Attending: INTERNAL MEDICINE
Payer: MEDICARE

## 2023-12-07 DIAGNOSIS — R73.03 PREDIABETES: ICD-10-CM

## 2023-12-07 LAB
ALBUMIN SERPL BCP-MCNC: 4.2 G/DL (ref 3.5–5.2)
ALP SERPL-CCNC: 50 U/L (ref 55–135)
ALT SERPL W/O P-5'-P-CCNC: 14 U/L (ref 10–44)
ANION GAP SERPL CALC-SCNC: 10 MMOL/L (ref 8–16)
AST SERPL-CCNC: 18 U/L (ref 10–40)
BILIRUB SERPL-MCNC: 0.9 MG/DL (ref 0.1–1)
BUN SERPL-MCNC: 18 MG/DL (ref 8–23)
CALCIUM SERPL-MCNC: 9.9 MG/DL (ref 8.7–10.5)
CHLORIDE SERPL-SCNC: 108 MMOL/L (ref 95–110)
CO2 SERPL-SCNC: 23 MMOL/L (ref 23–29)
CREAT SERPL-MCNC: 0.8 MG/DL (ref 0.5–1.4)
EST. GFR  (NO RACE VARIABLE): >60 ML/MIN/1.73 M^2
ESTIMATED AVG GLUCOSE: 120 MG/DL (ref 68–131)
GLUCOSE SERPL-MCNC: 102 MG/DL (ref 70–110)
HBA1C MFR BLD: 5.8 % (ref 4–5.6)
POTASSIUM SERPL-SCNC: 4.3 MMOL/L (ref 3.5–5.1)
PROT SERPL-MCNC: 7 G/DL (ref 6–8.4)
SODIUM SERPL-SCNC: 141 MMOL/L (ref 136–145)

## 2023-12-07 PROCEDURE — 36415 COLL VENOUS BLD VENIPUNCTURE: CPT | Mod: PO | Performed by: INTERNAL MEDICINE

## 2023-12-07 PROCEDURE — 80053 COMPREHEN METABOLIC PANEL: CPT | Performed by: INTERNAL MEDICINE

## 2023-12-07 PROCEDURE — 83036 HEMOGLOBIN GLYCOSYLATED A1C: CPT | Performed by: INTERNAL MEDICINE

## 2023-12-08 DIAGNOSIS — M54.2 CHRONIC NECK PAIN: ICD-10-CM

## 2023-12-08 DIAGNOSIS — G89.29 CHRONIC NECK PAIN: ICD-10-CM

## 2023-12-08 NOTE — TELEPHONE ENCOUNTER
No care due was identified.  [facility] Embedded Care Due Messages. Reference number: [id number].   12/08/2023 5:33:29 PM CST

## 2023-12-11 RX ORDER — CYCLOBENZAPRINE HCL 5 MG
TABLET ORAL
Qty: 10 TABLET | Refills: 0 | Status: SHIPPED | OUTPATIENT
Start: 2023-12-11 | End: 2024-03-29

## 2023-12-21 ENCOUNTER — PATIENT MESSAGE (OUTPATIENT)
Dept: NEUROLOGY | Facility: CLINIC | Age: 68
End: 2023-12-21
Payer: MEDICARE

## 2024-01-10 ENCOUNTER — IMMUNIZATION (OUTPATIENT)
Dept: INTERNAL MEDICINE | Facility: CLINIC | Age: 69
End: 2024-01-10
Payer: MEDICARE

## 2024-01-10 ENCOUNTER — OFFICE VISIT (OUTPATIENT)
Dept: INTERNAL MEDICINE | Facility: CLINIC | Age: 69
End: 2024-01-10
Payer: MEDICARE

## 2024-01-10 VITALS
BODY MASS INDEX: 25.51 KG/M2 | DIASTOLIC BLOOD PRESSURE: 78 MMHG | WEIGHT: 158.75 LBS | HEIGHT: 66 IN | SYSTOLIC BLOOD PRESSURE: 132 MMHG | HEART RATE: 70 BPM | OXYGEN SATURATION: 97 %

## 2024-01-10 DIAGNOSIS — Z17.0 MALIGNANT NEOPLASM OF RIGHT BREAST, STAGE 1, ESTROGEN RECEPTOR POSITIVE: ICD-10-CM

## 2024-01-10 DIAGNOSIS — J34.89 RHINORRHEA: ICD-10-CM

## 2024-01-10 DIAGNOSIS — I10 PRIMARY HYPERTENSION: ICD-10-CM

## 2024-01-10 DIAGNOSIS — E78.2 MIXED HYPERLIPIDEMIA: ICD-10-CM

## 2024-01-10 DIAGNOSIS — Z00.00 VISIT FOR ANNUAL HEALTH EXAMINATION: Primary | ICD-10-CM

## 2024-01-10 DIAGNOSIS — Z85.3 HISTORY OF BREAST CANCER: ICD-10-CM

## 2024-01-10 DIAGNOSIS — I70.0 AORTIC ATHEROSCLEROSIS: ICD-10-CM

## 2024-01-10 DIAGNOSIS — F51.01 PRIMARY INSOMNIA: ICD-10-CM

## 2024-01-10 DIAGNOSIS — F32.9 MAJOR DEPRESSION, CHRONIC: ICD-10-CM

## 2024-01-10 DIAGNOSIS — M85.89 OSTEOPENIA OF MULTIPLE SITES: ICD-10-CM

## 2024-01-10 DIAGNOSIS — R73.03 PREDIABETES: ICD-10-CM

## 2024-01-10 DIAGNOSIS — C50.911 MALIGNANT NEOPLASM OF RIGHT BREAST, STAGE 1, ESTROGEN RECEPTOR POSITIVE: ICD-10-CM

## 2024-01-10 PROBLEM — U07.1 COVID: Status: RESOLVED | Noted: 2023-09-14 | Resolved: 2024-01-10

## 2024-01-10 PROBLEM — R05.9 COUGH: Status: RESOLVED | Noted: 2023-09-14 | Resolved: 2024-01-10

## 2024-01-10 PROCEDURE — 90694 VACC AIIV4 NO PRSRV 0.5ML IM: CPT | Mod: PBBFAC

## 2024-01-10 PROCEDURE — 99999 PR PBB SHADOW E&M-EST. PATIENT-LVL III: CPT | Mod: PBBFAC,,, | Performed by: INTERNAL MEDICINE

## 2024-01-10 PROCEDURE — 99999PBSHW FLU VACCINE - QUADRIVALENT - ADJUVANTED: Mod: PBBFAC,,,

## 2024-01-10 PROCEDURE — 99397 PER PM REEVAL EST PAT 65+ YR: CPT | Mod: S$PBB,GZ,, | Performed by: INTERNAL MEDICINE

## 2024-01-10 PROCEDURE — 99213 OFFICE O/P EST LOW 20 MIN: CPT | Mod: PBBFAC | Performed by: INTERNAL MEDICINE

## 2024-01-10 PROCEDURE — G0008 ADMIN INFLUENZA VIRUS VAC: HCPCS | Mod: PBBFAC

## 2024-01-10 RX ORDER — TRAZODONE HYDROCHLORIDE 100 MG/1
100 TABLET ORAL NIGHTLY PRN
Qty: 90 TABLET | Refills: 3 | Status: SHIPPED | OUTPATIENT
Start: 2024-01-10 | End: 2025-01-09

## 2024-01-10 RX ORDER — ROSUVASTATIN CALCIUM 20 MG/1
20 TABLET, COATED ORAL DAILY
Qty: 90 TABLET | Refills: 3 | Status: SHIPPED | OUTPATIENT
Start: 2024-01-10

## 2024-01-10 RX ORDER — VENLAFAXINE HYDROCHLORIDE 75 MG/1
150 CAPSULE, EXTENDED RELEASE ORAL DAILY
Qty: 180 CAPSULE | Refills: 3 | Status: SHIPPED | OUTPATIENT
Start: 2024-01-10

## 2024-01-10 RX ORDER — LOSARTAN POTASSIUM 25 MG/1
25 TABLET ORAL DAILY
Qty: 90 TABLET | Refills: 3 | Status: SHIPPED | OUTPATIENT
Start: 2024-01-10

## 2024-01-10 NOTE — PROGRESS NOTES
"INTERNAL MEDICINE ESTABLISHED PATIENT VISIT NOTE    Subjective:     Chief Complaint: Annual Exam  HTN, HLD     Patient ID: Nguyen Ivan is a 68 y.o. female with HTN, HLD, preDM, Breast CA s/p B mastectomy 11/2017 and s/p breast reconstruction, fam hx breast CA, MVP (seen by Cards in the past at  and told it was no longer an issue, last echo many years ago), depression, hx liver lobectomy 2/2 "a growth on her liver" (path benign per pt and reports f/u imaging had been normal), baseline hearing impaired, chronic neck and back pain c hx herniated discs followed by Dr. Fairchild in the past but now seeing Dr. Hyman, last seen by me 7/2023 for telemed visit c last in person visit in 2020, here today for f/u HTN, HLD.    To review, has been followed by Dr. Alberta Hyman for chronic low back pain.  Had some recent issues c recurrent neck and back pain in Nov and was rx'ed Flexeril and did PT but stopped PT for now as she is awaiting and MRI from her pain mgmt dr.    Hx Breast CA followed by Dr. Betty Meyer.  Had CT scan last mo and monitoring liver and kidney lesion which has been stable.    Has been on Effexor and Wellbutrin for mood c prn Xanax which she rarely takes.  Also takes Trazodone for sleep.  States Trazodone alone does not always work but takes it c a THC gummy which works well.    Today also c c/o cold sx c congestion for the past two weeks.  No fever, no body aches.  Some sneezing.  Has never had issues c allergies in the past.    Past Medical History:  Past Medical History:   Diagnosis Date    Breast cancer 12/2017    Birads 4  Bilateral Mastectomy     Depression     Family history of breast cancer in sister     age 45,lumpectomy,XRT    H/O abnormal mammogram 10/30/2017    Birads 4    Mitral valve prolapse        Home Medications:  Prior to Admission medications    Medication Sig Start Date End Date Taking? Authorizing Provider   cyclobenzaprine (FLEXERIL) 5 MG tablet TAKE 1 TABLET(5 MG) BY MOUTH " EVERY NIGHT AS NEEDED FOR MUSCLE SPASMS 12/11/23   Teresa Biswas MD   acetaminophen (TYLENOL) 650 MG TbSR  7/1/21   Provider, Noemi   ALPRAZolam (XANAX) 0.25 MG tablet Take 1 tablet (0.25 mg total) by mouth every evening. 7/31/23   Teresa Biswas MD   buPROPion (WELLBUTRIN XL) 150 MG TB24 tablet Take 1 tablet (150 mg total) by mouth once daily. 7/31/23 7/30/24  Teresa Biswas MD   carbamide peroxide (DEBROX) 6.5 % otic solution Place 5 drops into the right ear 2 (two) times daily. 10/3/23   Melony Springer MD   cholecalciferol, vitamin D3, 125 mcg (5,000 unit) capsule TAKE 1 CAPSULE BY MOUTH EVERY DAY 11/7/23   Teresa Biswas MD   ipratropium (ATROVENT) 21 mcg (0.03 %) nasal spray 2 sprays by Each Nostril route every 12 (twelve) hours as needed for Rhinitis. 9/14/23   Priscila Toussaint MD   losartan (COZAAR) 25 MG tablet Take 1 tablet (25 mg total) by mouth once daily. 2/7/23   Godwin Alexandre MD   neomycin-polymyxin-hydrocortisone (CORTISPORIN) 3.5-10,000-1 mg/mL-unit/mL-% otic suspension Place 3 drops into both ears 4 (four) times daily. 9/14/23   Priscila Toussaint MD   rosuvastatin (CRESTOR) 20 MG tablet Take 1 tablet (20 mg total) by mouth once daily. 2/7/23   Godwin Alexandre MD   traZODone (DESYREL) 100 MG tablet Take 1 tablet (100 mg total) by mouth nightly as needed for Insomnia. 8/7/23 8/6/24  Teresa Biswas MD   venlafaxine (EFFEXOR-XR) 75 MG 24 hr capsule Take 2 capsules (150 mg total) by mouth once daily. 1/20/23   Lydia Reyes MD       Allergies:  Review of patient's allergies indicates:  No Known Allergies    Social History:  Social History     Tobacco Use    Smoking status: Never    Smokeless tobacco: Never   Substance Use Topics    Alcohol use: Yes     Alcohol/week: 0.0 - 4.0 standard drinks of alcohol     Comment: Social     Drug use: Yes     Types: Marijuana     Comment: [facility] gummies/medical marijuana        Review of Systems   Constitutional:  Negative for appetite change, chills, fatigue,  "fever and unexpected weight change.   HENT:  Positive for rhinorrhea. Negative for congestion, hearing loss, sinus pressure, sinus pain and sore throat.    Eyes:  Negative for pain and visual disturbance.   Respiratory:  Negative for cough, chest tightness, shortness of breath and wheezing.    Cardiovascular:  Negative for chest pain, palpitations and leg swelling.   Gastrointestinal:  Negative for abdominal distention and abdominal pain.   Endocrine: Negative for polydipsia and polyuria.   Genitourinary:  Negative for decreased urine volume, difficulty urinating, dysuria, hematuria and vaginal discharge.   Neurological:  Negative for weakness, numbness and headaches.   Psychiatric/Behavioral:  Negative for behavioral problems and confusion.          Health Maintenance:     Immunizations:   Influenza 2020, rec repeat today.  TDap 5/2019  Prevnar 13 5/2019, Prevnar 20 8/2023  Shingrix 1/2021, 4/2021  COVID 2/2021, 3/2021, 10/2021, 5/2022, 1/2023, booster rec now.  Declined today.  RSV rec now.  Pt declined today.     Cancer Screening:  PAP: 8/2016 neg c neg HPV, f/u pending 1/24 c KR  Mammogram:  N/a, hx B mastectomy  Colonoscopy:  6/2023 3 polyps c/w tubular adenoma, rec repeat in 3 yrs per Dr. Dela Cruz  DEXA:  8/2023 c osteopenia.    Objective:   /78 (BP Location: Left arm, Patient Position: Sitting, BP Method: Medium (Manual))   Pulse 70   Ht 5' 6" (1.676 m)   Wt 72 kg (158 lb 11.7 oz)   LMP  (LMP Unknown) Comment:   2003  SpO2 97%   BMI 25.62 kg/m²        General: AAO x3, no apparent distress  HEENT: no cervical LAD, no thyroid masses appreciated, rhinorrhea noted, scant fluid behind TM B, R>L  CV: RRR, no m/r/g  Pulm: Lungs CTAB, no crackles, no wheezes  Abd: s/NT/ND +BS  Extremities: no c/c/e    Labs:     Lab Results   Component Value Date    WBC 7.96 01/26/2023    HGB 13.5 01/26/2023    HCT 43.5 01/26/2023    MCV 95 01/26/2023     01/26/2023     Sodium   Date Value Ref Range Status "   12/07/2023 141 136 - 145 mmol/L Final     Potassium   Date Value Ref Range Status   12/07/2023 4.3 3.5 - 5.1 mmol/L Final     Chloride   Date Value Ref Range Status   12/07/2023 108 95 - 110 mmol/L Final     CO2   Date Value Ref Range Status   12/07/2023 23 23 - 29 mmol/L Final     Glucose   Date Value Ref Range Status   12/07/2023 102 70 - 110 mg/dL Final     BUN   Date Value Ref Range Status   12/07/2023 18 8 - 23 mg/dL Final     Creatinine   Date Value Ref Range Status   12/07/2023 0.8 0.5 - 1.4 mg/dL Final     Calcium   Date Value Ref Range Status   12/07/2023 9.9 8.7 - 10.5 mg/dL Final     Total Protein   Date Value Ref Range Status   12/07/2023 7.0 6.0 - 8.4 g/dL Final     Albumin   Date Value Ref Range Status   12/07/2023 4.2 3.5 - 5.2 g/dL Final     Total Bilirubin   Date Value Ref Range Status   12/07/2023 0.9 0.1 - 1.0 mg/dL Final     Comment:     For infants and newborns, interpretation of results should be based  on gestational age, weight and in agreement with clinical  observations.    Premature Infant recommended reference ranges:  Up to 24 hours.............<8.0 mg/dL  Up to 48 hours............<12.0 mg/dL  3-5 days..................<15.0 mg/dL  6-29 days.................<15.0 mg/dL       Alkaline Phosphatase   Date Value Ref Range Status   12/07/2023 50 (L) 55 - 135 U/L Final     AST   Date Value Ref Range Status   12/07/2023 18 10 - 40 U/L Final     ALT   Date Value Ref Range Status   12/07/2023 14 10 - 44 U/L Final     Anion Gap   Date Value Ref Range Status   12/07/2023 10 8 - 16 mmol/L Final     eGFR if    Date Value Ref Range Status   12/16/2021 >60.0 >60 mL/min/1.73 m^2 Final     eGFR    Date Value Ref Range Status   01/21/2022 >105 >89 mL/min Final     eGFR if non    Date Value Ref Range Status   12/16/2021 >60.0 >60 mL/min/1.73 m^2 Final     Comment:     Calculation used to obtain the estimated glomerular filtration  rate (eGFR) is the  CKD-EPI equation.        Lab Results   Component Value Date    HGBA1C 5.8 (H) 12/07/2023     Lab Results   Component Value Date    LDLCALC 70.0 08/01/2023     Lab Results   Component Value Date    TSH 0.626 01/26/2023         Assessment/Plan     Nguyen was seen today for annual exam.    Diagnoses and all orders for this visit:    Visit for annual health examination  History and physical exam completed.  Health maintenance reviewed as above.  Recent labs from Dec reviewed, all in acceptable range, see below.    Primary hypertension  at goal.  Cont current medications.  -     losartan (COZAAR) 25 MG tablet; Take 1 tablet (25 mg total) by mouth once daily.    Mixed hyperlipidemia  Lab Results   Component Value Date    LDLCALC 70.0 08/01/2023     -     rosuvastatin (CRESTOR) 20 MG tablet; Take 1 tablet (20 mg total) by mouth once daily.    Prediabetes  Lab Results   Component Value Date    HGBA1C 5.8 (H) 12/07/2023     Slightly improved on last check  Pt was counseled on the need to avoid intake of simple sugars and minimize carbohydrates.  Also recommended regular exercise.    History of breast cancer  Malignant neoplasm of right breast, stage 1, estrogen receptor positive  As per HPI  Cont routine f/u c Onc at     Major depression, chronic  Stable on current regimen.  -     venlafaxine (EFFEXOR-XR) 75 MG 24 hr capsule; Take 2 capsules (150 mg total) by mouth once daily.    Osteopenia of multiple sites  No acute issues.  DEXA utd  Rec wt bearing exercises as tolerated.    Aortic atherosclerosis  Noted on previous imaging.  Bp and lipid control as above    Primary insomnia  -     traZODone (DESYREL) 100 MG tablet; Take 1 tablet (100 mg total) by mouth nightly as needed for Insomnia.    Rhinorrhea  Likely common cold vs allergies  Rec otc coricidin prn, if sx persist can try zyrtec.      HM as above  RTC in 6 mos for f/u HTN, HLD, preDM, sooner if needed.    Teresa Biswas MD  Department of Internal Medicine - [facility]  Lonnie Morrison  01/10/2024

## 2024-01-17 ENCOUNTER — TELEPHONE (OUTPATIENT)
Dept: INTERNAL MEDICINE | Facility: CLINIC | Age: 69
End: 2024-01-17
Payer: MEDICARE

## 2024-01-17 ENCOUNTER — PATIENT MESSAGE (OUTPATIENT)
Dept: INTERNAL MEDICINE | Facility: CLINIC | Age: 69
End: 2024-01-17
Payer: MEDICARE

## 2024-01-17 NOTE — TELEPHONE ENCOUNTER
""After Visit Summary"  has malignant neoplasm of right breast, stage 1, estrogen receptor positive      Comments:   IS this a NEW diagnosis? I'm lost, Dr Biswas.  It is under the column that says these issues were discussed.'   Please respond, Nguyen          "

## 2024-01-24 ENCOUNTER — OFFICE VISIT (OUTPATIENT)
Dept: OBSTETRICS AND GYNECOLOGY | Facility: CLINIC | Age: 69
End: 2024-01-24
Payer: MEDICARE

## 2024-01-24 DIAGNOSIS — Z01.419 WOMEN'S ANNUAL ROUTINE GYNECOLOGICAL EXAMINATION: Primary | ICD-10-CM

## 2024-01-24 PROCEDURE — 99999 PR PBB SHADOW E&M-EST. PATIENT-LVL III: CPT | Mod: PBBFAC,,, | Performed by: NURSE PRACTITIONER

## 2024-01-24 PROCEDURE — G0101 CA SCREEN;PELVIC/BREAST EXAM: HCPCS | Mod: PBBFAC | Performed by: NURSE PRACTITIONER

## 2024-01-24 PROCEDURE — 99213 OFFICE O/P EST LOW 20 MIN: CPT | Mod: PBBFAC,25 | Performed by: NURSE PRACTITIONER

## 2024-01-24 PROCEDURE — G0101 CA SCREEN;PELVIC/BREAST EXAM: HCPCS | Mod: S$PBB,,, | Performed by: NURSE PRACTITIONER

## 2024-01-24 RX ORDER — DM/PE/ACETAMINOPHEN/CHLORPHENR 10-5-325-2
1 TABLET, SEQUENTIAL ORAL
COMMUNITY
Start: 2023-08-10

## 2024-01-24 NOTE — PROGRESS NOTES
CC: Annual  HPI: Pt is a 68 y.o.  female who presents for routine annual exam. She is , reporting persistent hot flashes with interest in treatment. The patient participates in regular exercise: Yes.  The patient does not smoke.  The patient wears seatbelts.   Pt denies any domestic violence.     FH:  Breast cancer: sister  Colon cancer: none  Ovarian cancer: none  Endometrial cancer: none    ROS:  GENERAL: Feeling well overall. Denies fever or chills.   SKIN: Denies rash or lesions.   HEAD: Denies head injury or headache.   NODES: Denies enlarged lymph nodes.   CHEST: Denies chest pain or shortness of breath.   CARDIOVASCULAR: Denies palpitations or left sided chest pain.   ABDOMEN: No abdominal pain, constipation, diarrhea, nausea, vomiting or rectal bleeding.   URINARY: No dysuria, hematuria, or burning on urination.  REPRODUCTIVE: See HPI.   BREASTS: Denies pain, lumps, or nipple discharge.   HEMATOLOGIC: No easy bruisability or excessive bleeding.   MUSCULOSKELETAL: Denies joint pain or swelling.   NEUROLOGIC: Denies syncope or weakness.   PSYCHIATRIC: Denies depression, anxiety or mood swings.    PE:   APPEARANCE: Well nourished, well developed, White female in no acute distress.  NODES: no cervical, supraclavicular, or inguinal lymphadenopathy  BREASTS: Symmetrical, no skin changes or visible lesions. No palpable masses, nipple discharge or adenopathy bilaterally.  ABDOMEN: Soft. No tenderness or masses. No distention. No hernias palpated. No CVA tenderness.  VULVA: No lesions. Normal external female genitalia.  URETHRAL MEATUS: Normal size and location, no lesions, no prolapse.  URETHRA: No masses, tenderness, or prolapse.  VAGINA: Moist. No lesions or lacerations noted. No abnormal discharge present. No odor present.   CERVIX: No lesions or discharge. No cervical motion tenderness.   UTERUS: Normal size, regular shape, mobile, non-tender.  ADNEXA: No tenderness. No fullness or masses palpated in the  adnexal regions.   ANUS PERINEUM: Normal.      Diagnosis:  1. Women's annual routine gynecological examination        Plan:      Possible candidate for Veozah, plan to assess medications and history then discuss.     Patient was counseled today on the new ACS guidelines for cervical cytology screening as well as the current recommendations for breast cancer screening. She was counseled to follow up with her PCP for other routine health maintenance. Counseling session lasted approximately 10 minutes, and all her questions were answered.    Follow-up with me in 1 year for routine exam       LAMINE Pascual

## 2024-01-29 ENCOUNTER — TELEPHONE (OUTPATIENT)
Dept: OBSTETRICS AND GYNECOLOGY | Facility: CLINIC | Age: 69
End: 2024-01-29
Payer: MEDICARE

## 2024-01-29 DIAGNOSIS — N95.1 HOT FLASHES DUE TO MENOPAUSE: Primary | ICD-10-CM

## 2024-01-29 NOTE — TELEPHONE ENCOUNTER
Contact with patient, candidate for Veozah, CMP today. Will call with results and treatment plan.

## 2024-02-14 DIAGNOSIS — F41.9 ANXIETY: ICD-10-CM

## 2024-02-14 RX ORDER — ALPRAZOLAM 0.25 MG/1
0.25 TABLET ORAL NIGHTLY
Qty: 30 TABLET | Refills: 0 | Status: SHIPPED | OUTPATIENT
Start: 2024-02-14

## 2024-02-14 NOTE — TELEPHONE ENCOUNTER
----- Message from Brandi Treviño sent at 2/14/2024 12:58 PM CST -----  Contact: [phone]  Requesting an RX refill or new RX.  Is this a refill or new RX: Refill 1  RX name and strength (copy/paste from chart):  ALPRAZolam (XANAX) 0.25 MG tablet  Is this a 30 day or 90 day RX:   Pharmacy name and phone # (copy/paste from chart):  [id number] - [address] AT [facility] Phone: [phone]  Fax: [phone]        The doctors have asked that we provide their patients with the following 2 reminders -- prescription refills can take up to 72 hours, and a friendly reminder that in the future you can use your [facility] account to request refills:

## 2024-02-20 ENCOUNTER — OFFICE VISIT (OUTPATIENT)
Dept: NEUROLOGY | Facility: CLINIC | Age: 69
End: 2024-02-20
Payer: COMMERCIAL

## 2024-02-20 DIAGNOSIS — T88.7XXA MEDICATION SIDE EFFECT: ICD-10-CM

## 2024-02-20 DIAGNOSIS — G47.00 INSOMNIA, UNSPECIFIED TYPE: ICD-10-CM

## 2024-02-20 DIAGNOSIS — R41.89 SUBJECTIVE COGNITIVE IMPAIRMENT: Primary | ICD-10-CM

## 2024-02-20 PROCEDURE — 96116 NUBHVL XM PHYS/QHP 1ST HR: CPT | Mod: 95,59,, | Performed by: PSYCHIATRY & NEUROLOGY

## 2024-02-20 PROCEDURE — 99215 OFFICE O/P EST HI 40 MIN: CPT | Mod: 95,,, | Performed by: PSYCHIATRY & NEUROLOGY

## 2024-02-20 RX ORDER — SUVOREXANT 20 MG/1
1 TABLET, FILM COATED ORAL NIGHTLY
Qty: 30 TABLET | Refills: 5 | Status: SHIPPED | OUTPATIENT
Start: 2024-02-20 | End: 2024-03-14

## 2024-02-20 NOTE — PROGRESS NOTES
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 Clubs or Organizations: Yes     Attends Club or Organization Meetings: More than 4 times per year     Marital Status:    Housing Stability: Low Risk  (2/20/2024)    Housing Stability Vital Sign     Unable to Pay for Housing in the Last Year: No     Number of Places Lived in the Last Year: 1     Unstable Housing in the Last Year: No       Medication:     Current Outpatient Medications on File Prior to Visit   Medication Sig Dispense Refill    acetaminophen (TYLENOL) 650 MG TbSR       ALPRAZolam (XANAX) 0.25 MG tablet Take 1 tablet (0.25 mg total) by mouth every evening. 30 tablet 0    B-COMPLEX PLUS VIT C, CALCIUM, 300 mg-150 mg calcium Tab Take 1 tablet by mouth.      buPROPion (WELLBUTRIN XL) 150 MG TB24 tablet Take 1 tablet (150 mg total) by mouth once daily. 90 tablet 3    cholecalciferol, vitamin D3, 125 mcg (5,000 unit) capsule TAKE 1 CAPSULE BY MOUTH EVERY DAY 90 capsule 3    cyclobenzaprine (FLEXERIL)  5 MG tablet TAKE 1 TABLET(5 MG) BY MOUTH EVERY NIGHT AS NEEDED FOR MUSCLE SPASMS 10 tablet 0    losartan (COZAAR) 25 MG tablet Take 1 tablet (25 mg total) by mouth once daily. 90 tablet 3    rosuvastatin (CRESTOR) 20 MG tablet Take 1 tablet (20 mg total) by mouth once daily. 90 tablet 3    traZODone (DESYREL) 100 MG tablet Take 1 tablet (100 mg total) by mouth nightly as needed for Insomnia. 90 tablet 3    venlafaxine (EFFEXOR-XR) 75 MG 24 hr capsule Take 2 capsules (150 mg total) by mouth once daily. 180 capsule 3     No current facility-administered medications on file prior to visit.        Review of patient's allergies indicates:  No Known Allergies    Medications Reconciliation:   I have reconciled the patient's home medications and discharge medications with the patient/family. I have updated all changes.  Refer to After-Visit Medication List.    Objective:  Vital Signs:  There were no vitals filed for this visit.  Wt Readings from Last 3 Encounters:   01/10/24 1132 72 kg (158 lb 11.7 oz)   10/03/23 1527 71.4 kg (157 lb 6.5 oz)   09/14/23 0843 73.9 kg (163 lb)     There is no height or weight on file to calculate BMI.           Neurological examination:  Mental Status:   Her appearance is normal (hygiene is appropriate; attire is proper and clean).  Throughout the interview, she is cooperative, her eye contact is appropriate.  Her behavior is appropriate to the clinical context without impropriety or improper language/conduct.  Her behavior was not characterized by episodes of sudden uncontrollable and inappropriate laughing or crying.  The patient is awake; Her energy level appeared normal.  Her orientation is normal; Spatial 5/5 (location, the floor of building, city, county, state) and temporal 5/5 (month, day, year, [place]) dimensions are accurate.  She has appropriate attention/concentration (ELSIE/[place] forwards and backward).  She can complete three-step commands.  Her fund of knowledge was appropriate for  age, culture, and level of education.  Her thought process is logical and goal-oriented.  She demonstrated appropriate insight based on actions, awareness of her illness, plans for the future.  She demonstrated good judgment based on actions and plans for the future.  She has no evidence of hallucinations (auditory, visual, olfactory).  She has no evidence of delusions (paranoid, grandiose, bizarre).  Cranial Nerves:   Her pupils were normal.  Her visual fields were full to confrontation in all quadrants.  Her ocular pursuit in the horizontal and vertical plane was complete.  Her saccadic initiation, velocity, and amplitude are normal.  Her facial strength was normal.  Her facial expression was symmetric and appropriate to the context.  Her tongue showed no evidence of scalloping.  She tongue movement with normal.  Speech/Language:   The patient's speech was fluent, non-effortful, and her rate was appropriate to the context.  She has no articulation (segmental features) errors.  The patient's speech is not dysarthric.  The patient's speech was without evidence of anomia.  She makes no phonological loop errors.  Motor:   The patient's bilateral upper extremity muscle bulk is appropriate.  The patient's bilateral upper extremity muscle tone is not increased.  Assessment of motor strength was symmetric and at minimal anti-gravity.  There is no pronator or downward drift.  Coordination:   She has no visible tremor.  She has no evidence of interhemispheric motor control deficits.  She has no motor overflow bilaterally.  The patient's bilateral upper extremity coordination with finger tapping, pronation/supination, and the open-close fist showed no slowing, no hypometria, and no dysrhythmia inconsistent with bradykinesia.  The patient's bilateral upper extremity coordination with finger tapping, pronation/supination, and the open-close fist showed slowing.  The patient's bilateral upper extremity coordination with finger  tapping, pronation/supination, and the open-close fist showed hypometria.  The patient's bilateral upper extremity coordination with finger tapping, pronation/supination, and the open-close fist showed dysrhythmia.  Higher Cortical Function:   The patient showed no evidence of visuospatial constructional dysfunction.  Reflexes:   Reflexes were symmetric and 2+ at biceps, 2+ triceps, and 2+ brachioradialis, 2+ at the knees bilaterally, there was no cross-abductor sign, 2+ in the bilateral ankles.  Gait:   She can arise from a chair and sit back down without using their arms.  Her gait was normal.  When attempting to walk abnormally (heels, tiptoes, tandem), she makes no errors.  Neuropsychological Evaluation Summary:  Prior Neurocognitive/Neurobehavioral Evaluation(s)  Informal Neurocognitive Assessment 8/18/21  - MMSE 28/28  - MOCA 27/29  2021-10-05:  Questionable Visuospatial Impairment: visuospatial construction.  BEHAV5+ 1/6: See ROS section for a full description  2022-04-19:  Subjective Cognitive impairment - Questionable Visuospatial Impairment: visuospatial construction.  BEHAV5+ 2/6: See ROS section for a full description  2023-02-14:  Subjective Cognitive impairment - Questionable Visuospatial Impairment: visuospatial construction.  Neurocognitive/Neurobehavioral Evaluation completed on 2024-02-20    Neuropsychiatric/Behavioral Focused Evaluation Assessment    BEHAV5+ 2/6 See ROS section for a full description   Laboratories:     Lab Date Value [Reference]   Neurodegenerative Cerebrospinal Fluid Assessment           Abeta42 2022, [facility]    888 [>1026 pg/mL]      p-Tau/Abeta42 2022, [facility]    0.015 [<=0.023 ratio]      Phospho-Tau (181P) 2022, [facility]    13 [<=21.7 pg/mL]      Total Tau 2022, [facility]    169 [<=238 pg/mL]      Cerebrospinal Fluid Assessment   Albumin, CSF 2022, [facility]    24.8 [<=27.0 ]      Glucose, CSF 2022, [facility]    57 [40 - 70 mg/dL]      Sena Free Light Chain, CSF 2022, [facility]    0.0108  [<0.1000 mg/dL]      Neuron Specific Enolase, CSF 2022, [facility]    19 (H) [ng/mL]      Protein, CSF 2022, [facility]    52 (H) [15 - 40 mg/dL]      VDRL, CSF 2022, [facility]    Negative      Appearance, CSF 2022, [facility]    Clear [Clear]      COLOR CSF 2022, [facility]    Colorless [Colorless]      Lymphs, CSF 2022, [facility]    76 [40 - 80 %]      Mono/Macrophage, CSF 2022, [facility]    18 [15 - 45 %]      RBC, CSF 2022, [facility]    239 (A) [0 /cu mm]      Segmented Neutrophils, CSF 2022, [facility]    6 [0 - 6 %]      WBC, CSF 2022, Doe-[facility]    1 [0 - 5 /cu mm]      Autoimmune/Paraneoplastic Screening   Antigliadin Ab IgG 2022, Jan-21    1.0 [<7.0 U/mL]      Antigliadin Abs, IgA 2022, Jan-21    0.7 [<7.0 U/mL]      Immunoglobulin A (IgA) 2022, Jan-21    127 [70 - 400 mg/dL]      PNEOE AGNA-1, Csf 2022, Doe-15    Negative      PNEOE YUSRA-1, Csf 2022, Jun-15    Negative      PNEOE YUSRA-2, Csf 2022, Jun-15    Negative      PNEOE YUSRA-3, Csf 2022, Doe-15    Negative      PNEOE, CRMP-5-IgG, Csf 2022, Doe-15    Negative      PNEOE, Amphiphysin Ab, Csf 2022, Doe-15    Negative      PNEOE, PCA-1, Csf 2022, Jun-15    Negative      PNEOE, PCA-2, Csf 2022, Jun-15    Negative      PNEOE, PCA-Tr, Csf 2022, Doe-15    Negative      TTG IgA 2022, Jan-21    <0.2 [<7.0 U/mL]      TTG IgG 2022, Jan-21    <0.6 [<7.0 U/mL]      Coagulopathy Screening   aPTT 2021, Aug-18    22.8 [21.0 - 32.0 sec]      Metabolic Screening   Hemoglobin A1C External 08/18/2021  5.8 (H) [4.0 - 5.6 %]      Homocysteine 08/18/2021  9.5 [4.0 - 15.5 umol/L]      Lipase 08/18/2021  28 [4 - 60 U/L]      Methlymalonic Acid 08/18/2021  0.14      T4 Total 01/19/2022  8.2 [4.5 - 11.5 ug/dL]      TSH 11/13/2020  0.626 [0.400 - 4.000 uIU/mL]      Glucose 2022, Jan-21    164 (H) (E) [70 - 110 mg/dL]      Albumin 2022, Jan-21    4.2 [3.5 - 5.2 g/dL]      Alkaline Phosphatase 2021, Dec-16    46 (L) [55 - 135 U/L]      ALT 2022, Jan-21    27 [10 - 44 U/L]      AST 2022, Jan-21    22 [10 - 40 U/L]       BILIRUBIN TOTAL 2022, Jan-21    0.9 [0.1 - 1.0 mg/dL]      PROTEIN TOTAL 2022, Jan-21    7.0 [6.0 - 8.4 g/dL]      Cholesterol 2020, Nov-13    225 (H) [120 - 199 mg/dL]      HDL 2022, Jan-21    68 [40 - 75 mg/dL]      Non-HDL Cholesterol 2022, Jan-21    95 [mg/dL]      Triglycerides 08/18/2021  125 [30 - 150 mg/dL]      Folate 08/18/2021  19.1 [4.0 - 24.0 ng/mL]      Vit D, 25-Hydroxy 08/18/2021  41 [30 - 96 ng/mL]      Vitamin B-12 08/18/2021  745 [180 - 914 ng/L]      Infectious Disease/Immunocompromised Screening   SARS Coronavirus 2 Antigen 2022, Jan-19    Positive !      SARS-CoV-2 RNA, Amplification, Qual 2021, Mar-18    Negative      Stool WBC 2022, Jan-20    No neutrophils seen [No neutrophils seen]      Hepatitis C Ab 08/18/2021  Non-reactive [Non-reactive ]      HIV 1/2 Ag/Ab 08/18/2021  Negative      Syphilis Treponemal Ab 08/18/2021  Nonreactive [Nonreactive]      Standard Hematology Screen   Hematocrit 2022, Jan-20    33.9 (L) (E) [37.0 - 48.5 %]      Hemoglobin 2022, Jan-20    11.2 (L) (E) [12.0 - 16.0 g/dL]      MCV 2022, Jan-19    95 [82 - 98 fL]      Platelets 2021, Dec-16    329 [150 - 450 K/uL]      Neuroendocrine/Electrolyte Screening   BUN 2022, Jan-21    13.0 (E) [9.0 - 23.0 mg/dL]      Chloride 2022, Jan-21    106 (E) [95 - 110 mmol/L]      Creatinine 2022, Jan-21    0.61 (E) [0.5 - 1.4 mg/dL]      Potassium 2022, Jan-21    4.1 [3.5 - 5.1 mmol/L]      Sodium 2022, Jan-21    139 [136 - 145 mmol/L]      Delirium Screening   Glucose, UA 2021, Dec-16    Negative      Ketones, UA 2021, Dec-16    Negative      Leukocytes, UA 2021, Dec-16    Negative      NITRITE UA 2021, Dec-16    Negative      Protein, UA 2021, Dec-16    Negative           Neuroimaging:    MRI brain/head without contrast on 10/04/2021  Technique: Multiplanar multisequence MR imaging of the brain was performed without intravenous contrast.  Formal interpretation by Radiology:  No evidence of acute or chronic intracranial  pathology.  Independently reviewed radiological imaging by Suraj Ravi MD. MPH. Behavioral Neurologist  T1: No significant cortical atrophy with age-appropriate changes. Subcortical nuclei and hippocampi are without significant atrophy.  T2/FLAIR: No Significant hyperintensities appreciated on MRI T2/FLAIR  DWI/ADC: No Significant DWI hyperintensities/hypointensities. No ADC correlation.  SWI/GRE: No Significant hypointensities to suggest cortical/subcortical hemosiderin deposition.  Impression: : Normal Brain Imaging.    MRI brain/head without contrast on 9/6/2012  Formal interpretation by Radiology:  not available  Independently reviewed radiological imaging by Suraj Ravi MD. MPH. Behavioral Neurologist  T1: No significant cortical atrophy with age-appropriate changes. Subcortical nuclei and hippocampi are without significant atrophy.  T2/FLAIR: No Significant hyperintensities appreciated on MRI T2/FLAIR  DWI/ADC: No Significant DWI hyperintensities/hypointensities. No ADC correlation.  SWI/GRE: No Significant hypointensities to suggest cortical/subcortical hemosiderin deposition.  Impression: : Normal Brain Imaging.    CT brain/head without contrast on 01/01/2019  Formal interpretation by Radiology:  not available  Independently reviewed radiological imaging by Suraj Ravi MD. MPH. Behavioral Neurologist  Impression: : No significant cortical atrophy with age-appropriate changes. Subcortical nuclei and hippocampi are without significant atrophy.     Procedures:    Electrocardiogram on 12/16/2021  Formal interpretation:  Vent. Rate : 067 BPM     Atrial Rate : 067 BPM    P-R Int : 148 ms          QRS Dur : 084 ms     QT Int : 398 ms       P-R-T Axes : 061 043 060 degrees    QTc Int : 420 ms Normal sinus rhythm Possible Left atrial enlargement Nonspecific ST and/or T wave abnormalities Abnormal ECG  Independently reviewed Electrocardiogram by Suraj Ravi MD. MPH. Behavioral  Neurologist  Impression: : Received ECG has no evidence of sinus node disease. HR (>=50-60). Prolonged GA interval (>0.22 s). Broad QRS complex (> 0.12 s).     Clinical Summary:     The patient is a 69-year-old right-handed female with a relevant past medical history of Breast CA s/p B mastectomy on letrozole, Depression/Anxiety, who presents reporting a 4-year history of gradually progressive neurocognitive impairment.       The clinical history is suggestive of:  Executive Impairment: Working Memory  Language Impairment: Energization  Psychiatric Impairment: Neurovegetative, Social Coherence, Signal-Noise Dysregulation, Mood Regulation, Stimulation Dysregulation  The neurological examination is significant for:  Normal Neurological Examination  The neurocognitive battery is significant (based on age and education) for:  Subjective Cognitive impairment - Questionable Visuospatial Impairment: visuospatial construction.  BEHAV5+ 2/6: See ROS section for a full description  The neurologically relevant imaging is significant for  MRI brain/head without contrast (10/04/2021): Normal Brain Imaging.  MRI brain/head without contrast (9/6/2012): Normal Brain Imaging.  CT brain/head without contrast (01/01/2019): No significant cortical atrophy with age-appropriate changes. Subcortical nuclei and hippocampi are without significant atrophy.        Assessment:        The patient's clinical presentation is amnestic predominant subjective cognitive impairment insufficient to interfere with activities of daily living (CDR-SOB: 0.5 - Questionable cognitive impairment).  The patient's clinical presentation does not meet the criteria for any specific neurodegenerative syndrome.At present, all neurodegenerative diseases can only be diagnosed with 100% certainty through a brain autopsy. The suspected neuropathology underlying the patient's neurocognitive impairment is most likely primarily due to Vascular Contributions to Cognitive  Impairment and Dementia.  There are no plasma protein biomarkers available on record.  Cerebrospinal Fluid Protein Biomarkers: [06/15/2022] NSE: 19 (H). [06/15/2022] NSE: 19 (H). p-Tau/Abeta42: 0.015 (Negative). Abeta42: 888 (Positive). Total Tau: 169 (Negative). Phospho-Tau (181P): 13 (Negative).  There are no dermatological protein biomarkers available on record.  NEGATIVE - Sequence analysis and deletion/duplication testing of the 57 genes listed in the Genes Analyzed section. [facility] Hereditary Parkinson Disease and Parkinsonism Panel [facility] Hereditary Amyotrophic Lateral Sclerosis, Frontotemporal Dementia and Alzheimer Disease Panel     The patient has a four-year history of a fluctuating neurocognitive disorder, which appears to be associated with the use of aromatase inhibitors, polypharmacy, and medication side effects. Cerebrospinal fluid biomarker testing conducted two years ago showed no evidence of an active Alzheimer's disease-related process. Despite this, the patient has expressed significant anxiety regarding her family history of cognitive disorders, underscoring the importance of monitoring and addressing both the physiological and psychological aspects of her condition.Given the patient's concerns and family history, it is prudent to recommend annual screening using serum biomarkers to monitor for any changes or developments in her neurocognitive status. This approach allows for early detection and intervention, should there be any indication of cognitive decline not previously evident.The patient has also reported new onset word-finding difficulty and cognitive impairment, which she attributes to the initiation of trazodone. Considering the potential for trazodone to contribute to cognitive side effects, particularly in sensitive individuals or those with a predisposition to neurocognitive disorders, it is recommended to taper off trazodone. Instead, transitioning back to Belsomra at a higher  dose may offer a more suitable alternative for managing her symptoms, particularly if Belsomra was previously effective and well-tolerated.This recommendation aims to address the immediate concern related to the exacerbation of cognitive symptoms potentially induced by trazodone, while also considering the patient's overall medication regimen and the need to minimize polypharmacy where possible. It is essential to closely monitor the patient's response to these medication adjustments, including any changes in sleep quality, cognitive function, and overall well-being, to ensure that the chosen therapeutic strategy effectively meets her needs and preferences.     The observations made above, were discussed with the patient. We have discussed the additional diagnostic(s) and/or managenent below.     Care Management Plan:    #Diagnostic Screening for reversible forms of neurocognitive disorders  We recommend screening for reversible causes of neurocognitive impairment with plasma laboratories  We have ordered plasma CBC, CMP, Vitamins (B1, B9, B12), Mg, RPR, MMA, TSH, T4, Nfl, ptau-181 serum  #Optimize Neurocognitive Impairment and Quality  We have discussed the MIND Diet and other lifestyle behavior that may help maintain brain health.  We have provided written/digital reading material  No indication for donepezil at this time.  #Optimize Behavioral Management and Quality.  No indication for memantine at this time  #Optimize Sleep Hygiene and Quality  We discussed and recommended additional diagnostic/management of sleep disorder to optimize brain health and longevity.  Recommend restarting Belsomra higher dose 20 mg q.h.s.  Recommend tapering off trazodone. Decrease to 50 mg q.h.s. for 1 week then stop  Continue CPAP  #Optimize Cerebrovascular Health.  The patient has a documented history of hyperlipidemia and/or hypercholesteremia with long-term complications such as cerebrovascular disease, peripheral vascular  disease, and/or aortic atherosclerosis. Collectively these risk factors may contribute to cerebral atherosclerosis, and cerebral hypoperfusion compounded neurocognitive disorder. We discussed maximizing cerebrovascular-related medical therapy, including but not limited to cholesterol medications and antiplatelet agents. We have discussed the value of aggressively controlling vascular risk factors like hypertension, hyperlipidemia, and Diabetes SBP<130, LDL<100, and A1C<7.0. We discussed the need to optimize lifestyle choices, including a heart-healthy diet (e.g., Mediterranean or DASH), increased cardiovascular exercise (goal 150 minutes of moderate-intensity per week), and staying cognitively and socially active.  Continue aspirin 81 mg  #Behavioral/Environmental Strategies  We recommend engaging in activities that stimulate cognitively and socially while avoiding excessive stimulation and fatigue in overwhelmingly complex situations.  We recommend integrating routine and schedule into your daily life. https://www.alzheimersproject.org/news/the-importance-of-routine-and-familiarity-to-persons-with-dementia/  #Health Maintenance/Lifestyle Advice  We have discussed the value in aggressively controlling vascular risk factors like hypertension, hyperlipidemia, and Diabetes SBP<130, LDL<100, A1C<7.0.  We discussed the need to optimize lifestyle choices including a heart-healthy diet (e.g., Mediterranean or DASH), increased cardiovascular exercise (goal 150 minutes of moderate-intensity per week), and stay cognitively and socially active.  #Support  We all need support sometimes. Get easy access to local resources, community programs, and services. https://www.communityresourcefinder.org/  Learn more about Cognitive Impairment in [place]: https://www.alz.org/professionals/public-health/state-overview[place]  #Follow up:  Follow-up as needed.    Thank you for allowing us to participate in the care of your patient.  Please do not hesitate to contact us with any questions or concerns.     It was a pleasure seeing The patient and we look forward to seeing them at their follow-up visit.     This note is dictated on M*Modal Fluency Direct word recognition program. There are word recognition mistakes that are occasionally missed on review.       Scheduled Follow-up :  No future appointments.    After Visit Medication List :     Medication List            Accurate as of February 20, 2024  2:58 PM. If you have any questions, ask your nurse or doctor.                START taking these medications      BELSOMRA 20 mg Tab  Generic drug: suvorexant  Take 1 tablet by mouth every evening.  Started by: Suraj Obrien MD            CONTINUE taking these medications      acetaminophen 650 MG Tbsr  Commonly known as: TYLENOL     ALPRAZolam 0.25 MG tablet  Commonly known as: XANAX  Take 1 tablet (0.25 mg total) by mouth every evening.     B-COMPLEX PLUS VIT C (CALCIUM) 300 mg-150 mg calcium Tab  Generic drug: vit B comp with C-calcium carb     buPROPion 150 MG TB24 tablet  Commonly known as: WELLBUTRIN XL  Take 1 tablet (150 mg total) by mouth once daily.     cholecalciferol (vitamin D3) 125 mcg (5,000 unit) capsule  TAKE 1 CAPSULE BY MOUTH EVERY DAY     cyclobenzaprine 5 MG tablet  Commonly known as: FLEXERIL  TAKE 1 TABLET(5 MG) BY MOUTH EVERY NIGHT AS NEEDED FOR MUSCLE SPASMS     losartan 25 MG tablet  Commonly known as: COZAAR  Take 1 tablet (25 mg total) by mouth once daily.     rosuvastatin 20 MG tablet  Commonly known as: CRESTOR  Take 1 tablet (20 mg total) by mouth once daily.     traZODone 100 MG tablet  Commonly known as: DESYREL  Take 1 tablet (100 mg total) by mouth nightly as needed for Insomnia.     venlafaxine 75 MG 24 hr capsule  Commonly known as: EFFEXOR-XR  Take 2 capsules (150 mg total) by mouth once daily.               Where to Get Your Medications        [facility]  [id number]  [facility] [id number]      Hours: Mon-Fri 7a-7p, Sat-Sun 10a-4p Phone: [phone]   BELSOMRA 20 mg Tab         Signing Physician:  Suraj Obrien MD    Billing:       -----------------------------------------------------------------------------  I performed this consultation using real-time Telehealth tools, including a live video connection between my location and the patient's location (their home [facility]). Prior to initiating the consultation, I obtained informed verbal consent to perform this consultation using Telehealth tools and answered all the questions about the Telehealth interaction. The participants understand that only a limited neurological exam and limited neuropsychological testing can be performed using Telehealth tools.  I spent a total of 45 minutes (time-in: 14:30 PM; time-out: 15:15 PM) on 2024-02-20, virtually face-to-face with the patient and caregiver(s), >50% of that time was spent counseling regarding the symptoms, treatment plan, risks, therapeutic options, lifestyle modifications, and/or safety issues for the diagnoses above.  10/14 Review of Systems completed and is negative except as stated above in HPI (Systems reviewed: Const, Eyes, ENT, Resp, CV, GI, , MSK, Skin, Neuro)  I reviewed previous labs for a total of 5 minutes on 2024-02-20. This is directly related to the face-to-face encounter. Review of previous labs was performed all negative except as stated above in HPI  I reviewed the summation of records from outside physicians for a total of 5 minutes on 2024-02-20. Reviewed and summation of records from an outside physician was performed as summarized above in HPI  I performed a neurobehavioral status examination that included a clinical assessment of thinking, reasoning, and judgment to ensure a comprehensive approach in managing the complex and evolving needs of the patient's neurocognitive condition. Please see above HPI and ROS for full  details. This exam was performed on 2024-02-20 and included 15 minutes spent on direct face-to-face clinical observation and interview with the patient and 19 minutes spent interpreting test results and preparing the report. The total time of 34 minutes spent on the neurobehavioral status examination is not included in the time spent on evaluation and management coding.  Total Billing time spent on encounter/documentation for this patient's evaluation and management, not including the neurobehavioral status examination: 40 minutes.

## 2024-02-21 ENCOUNTER — TELEPHONE (OUTPATIENT)
Dept: NEUROLOGY | Facility: CLINIC | Age: 69
End: 2024-02-21
Payer: MEDICARE

## 2024-02-21 NOTE — TELEPHONE ENCOUNTER
----- Message from Carson Diaz MA sent at 2/20/2024  5:15 PM CST -----    ----- Message -----  From: Suraj Obrien MD  Sent: 2/20/2024   3:00 PM CST  To: [facility] Staff    Please help pt schedule screening labs

## 2024-02-23 ENCOUNTER — LAB VISIT (OUTPATIENT)
Dept: LAB | Facility: HOSPITAL | Age: 69
End: 2024-02-23
Attending: PSYCHIATRY & NEUROLOGY
Payer: MEDICARE

## 2024-02-23 DIAGNOSIS — R41.89 SUBJECTIVE COGNITIVE IMPAIRMENT: ICD-10-CM

## 2024-02-23 DIAGNOSIS — G47.00 INSOMNIA, UNSPECIFIED TYPE: ICD-10-CM

## 2024-02-23 LAB
ALBUMIN SERPL BCP-MCNC: 3.9 G/DL (ref 3.5–5.2)
ALP SERPL-CCNC: 46 U/L (ref 55–135)
ALT SERPL W/O P-5'-P-CCNC: 23 U/L (ref 10–44)
ANION GAP SERPL CALC-SCNC: 9 MMOL/L (ref 8–16)
AST SERPL-CCNC: 19 U/L (ref 10–40)
BASOPHILS # BLD AUTO: 0.07 K/UL (ref 0–0.2)
BASOPHILS NFR BLD: 1.2 % (ref 0–1.9)
BILIRUB SERPL-MCNC: 0.6 MG/DL (ref 0.1–1)
BUN SERPL-MCNC: 13 MG/DL (ref 8–23)
CALCIUM SERPL-MCNC: 9.7 MG/DL (ref 8.7–10.5)
CHLORIDE SERPL-SCNC: 106 MMOL/L (ref 95–110)
CO2 SERPL-SCNC: 27 MMOL/L (ref 23–29)
CREAT SERPL-MCNC: 0.8 MG/DL (ref 0.5–1.4)
DIFFERENTIAL METHOD BLD: ABNORMAL
EOSINOPHIL # BLD AUTO: 0.2 K/UL (ref 0–0.5)
EOSINOPHIL NFR BLD: 3.4 % (ref 0–8)
ERYTHROCYTE [DISTWIDTH] IN BLOOD BY AUTOMATED COUNT: 13.4 % (ref 11.5–14.5)
EST. GFR  (NO RACE VARIABLE): >60 ML/MIN/1.73 M^2
GLUCOSE SERPL-MCNC: 138 MG/DL (ref 70–110)
HCT VFR BLD AUTO: 42.8 % (ref 37–48.5)
HGB BLD-MCNC: 13.3 G/DL (ref 12–16)
IGG SERPL-MCNC: 657 MG/DL (ref 650–1600)
IMM GRANULOCYTES # BLD AUTO: 0.02 K/UL (ref 0–0.04)
IMM GRANULOCYTES NFR BLD AUTO: 0.4 % (ref 0–0.5)
LYMPHOCYTES # BLD AUTO: 1.8 K/UL (ref 1–4.8)
LYMPHOCYTES NFR BLD: 32.5 % (ref 18–48)
MCH RBC QN AUTO: 29.2 PG (ref 27–31)
MCHC RBC AUTO-ENTMCNC: 31.1 G/DL (ref 32–36)
MCV RBC AUTO: 94 FL (ref 82–98)
MONOCYTES # BLD AUTO: 0.4 K/UL (ref 0.3–1)
MONOCYTES NFR BLD: 6.4 % (ref 4–15)
NEUTROPHILS # BLD AUTO: 3.2 K/UL (ref 1.8–7.7)
NEUTROPHILS NFR BLD: 56.1 % (ref 38–73)
NRBC BLD-RTO: 0 /100 WBC
PLATELET # BLD AUTO: 239 K/UL (ref 150–450)
PMV BLD AUTO: 9.8 FL (ref 9.2–12.9)
POTASSIUM SERPL-SCNC: 3.8 MMOL/L (ref 3.5–5.1)
PROT SERPL-MCNC: 6.8 G/DL (ref 6–8.4)
RBC # BLD AUTO: 4.55 M/UL (ref 4–5.4)
SODIUM SERPL-SCNC: 142 MMOL/L (ref 136–145)
WBC # BLD AUTO: 5.63 K/UL (ref 3.9–12.7)

## 2024-02-23 PROCEDURE — 83921 ORGANIC ACID SINGLE QUANT: CPT | Performed by: PSYCHIATRY & NEUROLOGY

## 2024-02-23 PROCEDURE — 82784 ASSAY IGA/IGD/IGG/IGM EACH: CPT | Performed by: PSYCHIATRY & NEUROLOGY

## 2024-02-23 PROCEDURE — 85025 COMPLETE CBC W/AUTO DIFF WBC: CPT | Performed by: PSYCHIATRY & NEUROLOGY

## 2024-02-23 PROCEDURE — 83520 IMMUNOASSAY QUANT NOS NONAB: CPT | Performed by: PSYCHIATRY & NEUROLOGY

## 2024-02-23 PROCEDURE — 80053 COMPREHEN METABOLIC PANEL: CPT | Performed by: PSYCHIATRY & NEUROLOGY

## 2024-02-23 PROCEDURE — 36415 COLL VENOUS BLD VENIPUNCTURE: CPT | Mod: PO | Performed by: PSYCHIATRY & NEUROLOGY

## 2024-02-26 LAB
NEUROFILAMENT LIGHT CHAIN, PLASMA: 9.5 PG/ML
VIT B12 SERPL-MCNC: 288 NG/L (ref 180–914)

## 2024-02-28 LAB — PHOSPHO-TAU (181P): 0.67 PG/ML (ref 0–0.97)

## 2024-03-05 LAB — METHYLMALONATE SERPL-SCNC: 0.13 NMOL/ML

## 2024-03-09 ENCOUNTER — TELEPHONE (OUTPATIENT)
Dept: NEUROLOGY | Facility: CLINIC | Age: 69
End: 2024-03-09
Payer: MEDICARE

## 2024-03-09 NOTE — TELEPHONE ENCOUNTER
----- Message from Suraj Obrien MD sent at 3/5/2024  1:20 PM CST -----  Plx schedule telephone f/u to disclose results     ----- Message -----  From: [facility]  Sent: 2/23/2024   6:26 PM CST  To: Suraj Obrien MD

## 2024-03-13 DIAGNOSIS — R41.89 SUBJECTIVE COGNITIVE IMPAIRMENT: ICD-10-CM

## 2024-03-13 DIAGNOSIS — G47.00 INSOMNIA, UNSPECIFIED TYPE: ICD-10-CM

## 2024-03-14 RX ORDER — SUVOREXANT 20 MG/1
0.5 TABLET, FILM COATED ORAL NIGHTLY
Qty: 30 TABLET | Refills: 5 | Status: SHIPPED | OUTPATIENT
Start: 2024-03-14 | End: 2024-04-29 | Stop reason: SDUPTHER

## 2024-03-28 ENCOUNTER — OFFICE VISIT (OUTPATIENT)
Dept: NEUROLOGY | Facility: CLINIC | Age: 69
End: 2024-03-28
Payer: COMMERCIAL

## 2024-03-28 DIAGNOSIS — E53.8 B12 DEFICIENCY: ICD-10-CM

## 2024-03-28 DIAGNOSIS — G47.00 INSOMNIA, UNSPECIFIED TYPE: ICD-10-CM

## 2024-03-28 DIAGNOSIS — R41.89 SUBJECTIVE COGNITIVE IMPAIRMENT: Primary | ICD-10-CM

## 2024-03-28 DIAGNOSIS — T88.7XXA MEDICATION SIDE EFFECT: ICD-10-CM

## 2024-03-28 DIAGNOSIS — Z79.899 POLYPHARMACY: ICD-10-CM

## 2024-03-28 DIAGNOSIS — Z81.8 FAMILY HISTORY OF FIRST DEGREE RELATIVE WITH DEMENTIA: ICD-10-CM

## 2024-03-28 PROCEDURE — 96116 NUBHVL XM PHYS/QHP 1ST HR: CPT | Mod: 95,59,, | Performed by: PSYCHIATRY & NEUROLOGY

## 2024-03-28 PROCEDURE — 99215 OFFICE O/P EST HI 40 MIN: CPT | Mod: 95,,, | Performed by: PSYCHIATRY & NEUROLOGY

## 2024-03-28 RX ORDER — ACETAMINOPHEN, DIPHENHYDRAMINE HCL, PHENYLEPHRINE HCL 325; 25; 5 MG/1; MG/1; MG/1
TABLET ORAL
Qty: 30 TABLET | Refills: 3 | Status: SHIPPED | OUTPATIENT
Start: 2024-03-28

## 2024-03-28 RX ORDER — ACETAMINOPHEN, DIPHENHYDRAMINE HCL, PHENYLEPHRINE HCL 325; 25; 5 MG/1; MG/1; MG/1
TABLET ORAL
Qty: 30 TABLET | Refills: 3 | Status: CANCELLED | OUTPATIENT
Start: 2024-03-28

## 2024-03-28 NOTE — PROGRESS NOTES
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     Miscarriages / Stillbirths Sister     Dementia Sister     Breast cancer Maternal Aunt     Breast cancer Paternal Aunt     Colon cancer Neg Hx     Ovarian cancer Neg Hx     Uterine cancer Neg Hx     Cervical cancer Neg Hx     Prostate cancer Neg Hx        Social History     Socioeconomic History    Marital status:    Tobacco Use    Smoking status: Never    Smokeless tobacco: Never   Substance and Sexual Activity    Alcohol use: Yes     Alcohol/week: 0.0 - 4.0 standard drinks of alcohol     Comment: Social     Drug use: Yes     Types: Marijuana     Comment: [facility] gummies/medical marijuana    Sexual activity: Yes     Partners: Male     Birth control/protection: Post-menopausal, See Surgical Hx     Comment: tubal ligation 198     Social Determinants of Health     Food Insecurity: No Food Insecurity (2024)    Hunger Vital Sign     Worried About Running Out of Food in the Last Year: Never true     Ran Out of Food in the Last Year: Never true   Transportation Needs: No Transportation Needs (2024)    PRAPARE - Transportation     Lack  of Transportation (Medical): No     Lack of Transportation (Non-Medical): No   Physical Activity: Inactive (2/20/2024)    Exercise Vital Sign     Days of Exercise per Week: 0 days     Minutes of Exercise per Session: 30 min   Stress: Stress Concern Present (2/20/2024)    Citizen of [place] [employer] - Occupational Stress Questionnaire     Feeling of Stress : To some extent   Social Connections: Unknown (2/20/2024)    Social Connection and Isolation Panel [NHANES]     Frequency of Communication with Friends and Family: More than three times a week     Frequency of Social Gatherings with Friends and Family: Three times a week     Active Member of Clubs or Organizations: Yes     Attends Club or Organization Meetings: More than 4 times per year     Marital Status:    Housing Stability: Low Risk  (2/20/2024)    Housing Stability Vital Sign     Unable to Pay for Housing in the Last Year: No     Number of Places Lived in the Last Year: 1     Unstable Housing in the Last Year: No       Medication:     Current Outpatient Medications on File Prior to Visit   Medication Sig Dispense Refill    acetaminophen (TYLENOL) 650 MG TbSR       ALPRAZolam (XANAX) 0.25 MG tablet Take 1 tablet (0.25 mg total) by mouth every evening. 30 tablet 0    B-COMPLEX PLUS VIT C, CALCIUM, 300 mg-150 mg calcium Tab Take 1 tablet by mouth.      buPROPion (WELLBUTRIN XL) 150 MG TB24 tablet Take 1 tablet (150 mg total) by mouth once daily. 90 tablet 3    cholecalciferol, vitamin D3, 125 mcg (5,000 unit) capsule TAKE 1 CAPSULE BY MOUTH EVERY DAY 90 capsule 3    cyclobenzaprine (FLEXERIL) 5 MG tablet TAKE 1 TABLET(5 MG) BY MOUTH EVERY NIGHT AS NEEDED FOR MUSCLE SPASMS 10 tablet 0    losartan (COZAAR) 25 MG tablet Take 1 tablet (25 mg total) by mouth once daily. 90 tablet 3    rosuvastatin (CRESTOR) 20 MG tablet Take 1 tablet (20 mg total) by mouth once daily. 90 tablet 3    suvorexant (BELSOMRA) 20 mg Tab TAKE 1/2 TABLET BY MOUTH AT  BEDTIME 30 tablet 5    traZODone (DESYREL) 100 MG tablet Take 1 tablet (100 mg total) by mouth nightly as needed for Insomnia. 90 tablet 3    venlafaxine (EFFEXOR-XR) 75 MG 24 hr capsule Take 2 capsules (150 mg total) by mouth once daily. 180 capsule 3     No current facility-administered medications on file prior to visit.        Review of patient's allergies indicates:  No Known Allergies    Medications Reconciliation:   I have reconciled the patient's home medications and discharge medications with the patient/family. I have updated all changes.  Refer to After-Visit Medication List.    Objective:  Vital Signs:  There were no vitals filed for this visit.  Wt Readings from Last 3 Encounters:   01/10/24 1132 72 kg (158 lb 11.7 oz)   10/03/23 1527 71.4 kg (157 lb 6.5 oz)   09/14/23 0843 73.9 kg (163 lb)     There is no height or weight on file to calculate BMI.           Neurological examination:  Mental Status:   Her appearance is normal (hygiene is appropriate; attire is proper and clean).  Throughout the interview, she is cooperative, her eye contact is appropriate.  Her behavior is appropriate to the clinical context without impropriety or improper language/conduct.  Her behavior was not characterized by episodes of sudden uncontrollable and inappropriate laughing or crying.  The patient is awake; Her energy level appeared normal.  Her orientation is normal; Spatial 5/5 (location, the floor of building, city, county, state) and temporal 5/5 (month, day, year, [place]) dimensions are accurate.  She has appropriate attention/concentration (ELSIE/[place] forwards and backward).  She can complete three-step commands.  Her fund of knowledge was appropriate for age, culture, and level of education.  Her thought process is logical and goal-oriented.  She demonstrated appropriate insight based on actions, awareness of her illness, plans for the future.  She demonstrated good judgment based on actions and plans for the  future.  She has no evidence of hallucinations (auditory, visual, olfactory).  She has no evidence of delusions (paranoid, grandiose, bizarre).  Cranial Nerves:   Her pupils were normal.  Her visual fields were full to confrontation in all quadrants.  Her ocular pursuit in the horizontal and vertical plane was complete.  Her saccadic initiation, velocity, and amplitude are normal.  Her facial strength was normal.  Her facial expression was symmetric and appropriate to the context.  Her tongue showed no evidence of scalloping.  She tongue movement with normal.  Speech/Language:   The patient's speech was fluent, non-effortful, and her rate was appropriate to the context.  She has no articulation (segmental features) errors.  The patient's speech is not dysarthric.  The patient's speech was without evidence of anomia.  She makes no phonological loop errors.  Motor:   The patient's bilateral upper extremity muscle bulk is appropriate.  The patient's bilateral upper extremity muscle tone is not increased.  Assessment of motor strength was symmetric and at minimal anti-gravity.  There is no pronator or downward drift.  Coordination:   She has no visible tremor.  She has no evidence of interhemispheric motor control deficits.  She has no motor overflow bilaterally.  The patient's bilateral upper extremity coordination with finger tapping, pronation/supination, and the open-close fist showed no slowing, no hypometria, and no dysrhythmia inconsistent with bradykinesia.  The patient's bilateral upper extremity coordination with finger tapping, pronation/supination, and the open-close fist showed slowing.  The patient's bilateral upper extremity coordination with finger tapping, pronation/supination, and the open-close fist showed hypometria.  The patient's bilateral upper extremity coordination with finger tapping, pronation/supination, and the open-close fist showed dysrhythmia.  Higher Cortical Function:   The patient  showed no evidence of visuospatial constructional dysfunction.  Reflexes:   Reflexes were symmetric and 2+ at biceps, 2+ triceps, and 2+ brachioradialis, 2+ at the knees bilaterally, there was no cross-abductor sign, 2+ in the bilateral ankles.  Gait:   She can arise from a chair and sit back down without using their arms.  Her gait was normal.  When attempting to walk abnormally (heels, tiptoes, tandem), she makes no errors.  Neuropsychological Evaluation Summary:  Prior Neurocognitive/Neurobehavioral Evaluation(s)  Informal Neurocognitive Assessment 8/18/21  - MMSE 28/28  - MOCA 27/29  2021-10-05:  Questionable Visuospatial Impairment: visuospatial construction.  BEHAV5+ 1/6: See ROS section for a full description  2022-04-19:  Subjective Cognitive impairment - Questionable Visuospatial Impairment: visuospatial construction.  BEHAV5+ 2/6: See ROS section for a full description  2023-02-14:  Subjective Cognitive impairment - Questionable Visuospatial Impairment: visuospatial construction.  Neurocognitive/Neurobehavioral Evaluation completed on 2024-03-28    Neuropsychiatric/Behavioral Focused Evaluation Assessment    BEHAV5+ 2/6 See ROS section for a full description   Laboratories:     Lab Date Value [Reference]   Neurodegenerative Cerebrospinal Fluid Assessment           Abeta42 2022, [facility]    888 [>1026 pg/mL]      p-Tau/Abeta42 2022, [facility]    0.015 [<=0.023 ratio]      Phospho-Tau (181P) 2024, Feb-23    0.67 [0.00 - 0.97 pg/mL]      Total Tau 2022, [facility]    169 [<=238 pg/mL]      Cerebrospinal Fluid Assessment   Albumin, CSF 2022, [facility]    24.8 [<=27.0 ]      Glucose, CSF 2022, Doe-15    57 [40 - 70 mg/dL]      IgG 2024, Feb-23    657 [650 - 1600 mg/dL]      [place] Free Light Chain, CSF 2022, Doe-15    0.0108 [<0.1000 mg/dL]      Neuron Specific Enolase, CSF 2022, Doe-[facility]    19 (H) [ng/mL]      Protein, CSF 2022, Doe-15    52 (H) [15 - 40 mg/dL]      VDRL, CSF 2022, Doe-15    Negative      Appearance, CSF  2022, Doe-15    Clear [Clear]      COLOR CSF 2022, Doe-15    Colorless [Colorless]      Lymphs, CSF 2022, Doe-15    76 [40 - 80 %]      Mono/Macrophage, CSF 2022, Doe-15    18 [15 - 45 %]      RBC, CSF 2022, Doe-15    239 (A) [0 /cu mm]      Segmented Neutrophils, CSF 2022, Doe-15    6 [0 - 6 %]      WBC, CSF 2022, Doe-15    1 [0 - 5 /cu mm]      Autoimmune/Paraneoplastic Screening   Antigliadin Ab IgG 2022, Jan-21    1.0 [<7.0 U/mL]      Antigliadin Abs, IgA 2022, Jan-21    0.7 [<7.0 U/mL]      Immunoglobulin A (IgA) 2022, Jan-21    127 [70 - 400 mg/dL]      PNEOE AGNA-1, Csf 2022, Jun-15    Negative      PNEOE YUSRA-1, Csf 2022, Jun-15    Negative      PNEOE YUSRA-2, Csf 2022, Jun-15    Negative      PNEOE YUSRA-3, Csf 2022, Jun-15    Negative      PNEOE, CRMP-5-IgG, Csf 2022, Jun-15    Negative      PNEOE, Amphiphysin Ab, Csf 2022, Jun-15    Negative      PNEOE, PCA-1, Csf 2022, Jun-15    Negative      PNEOE, PCA-2, Csf 2022, Jun-15    Negative      PNEOE, PCA-Tr, Csf 2022, Doe-15    Negative      TTG IgA 2022, Jan-21    <0.2 [<7.0 U/mL]      TTG IgG 2022, Jan-21    <0.6 [<7.0 U/mL]      Coagulopathy Screening   aPTT 2021, Aug-18    22.8 [21.0 - 32.0 sec]      Metabolic Screening   Hemoglobin A1C External 08/18/2021  5.8 (H) [4.0 - 5.6 %]      Homocysteine 08/18/2021  9.5 [4.0 - 15.5 umol/L]      Lipase 08/18/2021  28 [4 - 60 U/L]      Methlymalonic Acid 08/18/2021  0.14      T4 Total 01/19/2022  8.2 [4.5 - 11.5 ug/dL]      TSH 11/13/2020  0.626 [0.400 - 4.000 uIU/mL]      Glucose 2024, Feb-23    138 (H) [70 - 110 mg/dL]      Albumin 2024, Feb-23    3.9 [3.5 - 5.2 g/dL]      Alkaline Phosphatase 2021, Dec-16    46 (L) [55 - 135 U/L]      ALT 2024, Feb-23    23 [10 - 44 U/L]      AST 2024, Feb-23    19 [10 - 40 U/L]      BILIRUBIN TOTAL 2024, Feb-23    0.6 [0.1 - 1.0 mg/dL]      PROTEIN TOTAL 2024, Feb-23    6.8 [6.0 - 8.4 g/dL]      Cholesterol 2020, Nov-13    225 (H) [120 - 199 mg/dL]      HDL 2022, Jan-21 68  [40 - 75 mg/dL]      Non-HDL Cholesterol 2022, Jan-21    95 [mg/dL]      Triglycerides 02/23/2024  125 [30 - 150 mg/dL]      B12 Def. Methylmalonic Acid 08/18/2021  0.13 [<=0.40 nmol/mL]      Folate 08/18/2021  19.1 [4.0 - 24.0 ng/mL]      Vit D, 25-Hydroxy 08/18/2021  41 [30 - 96 ng/mL]      Vitamin B-12 08/18/2021  745 [180 - 914 ng/L]      Neurodegenerative Serum Fluid Assessment   NEUROFILAMENT LIGHT CHAIN, PLASMA 2024, Feb-23    9.5 [<=37.9 pg/mL]      Infectious Disease/Immunocompromised Screening   SARS Coronavirus 2 Antigen 2022, Jan-19    Positive !      SARS-CoV-2 RNA, Amplification, Qual 2021, Mar-18    Negative      Stool WBC 2022, Jan-20    No neutrophils seen [No neutrophils seen]      Hepatitis C Ab 08/18/2021  Non-reactive [Non-reactive ]      HIV 1/2 Ag/Ab 08/18/2021  Negative      Syphilis Treponemal Ab 08/18/2021  Nonreactive [Nonreactive]      Standard Hematology Screen   Hematocrit 2024, Feb-23    42.8 [37.0 - 48.5 %]      Hemoglobin 2024, Feb-23    13.3 [12.0 - 16.0 g/dL]      MCV 2024, Feb-23    94 [82 - 98 fL]      Platelets 2021, Dec-16    329 [150 - 450 K/uL]      Neuroendocrine/Electrolyte Screening   BUN 2024, Feb-23    13 [8 - 23 mg/dL]      Chloride 2024, Feb-23    106 [95 - 110 mmol/L]      Creatinine 2024, Feb-23    0.8 [0.5 - 1.4 mg/dL]      Potassium 2024, Feb-23    3.8 [3.5 - 5.1 mmol/L]      Sodium 2024, Feb-23    142 [136 - 145 mmol/L]      Delirium Screening   Glucose, UA 2021, Dec-16    Negative      Ketones, UA 2021, Dec-16    Negative      Leukocytes, UA 2021, Dec-16    Negative      NITRITE UA 2021, Dec-16    Negative      Protein, UA 2021, Dec-16    Negative           Neuroimaging:    MRI brain/head without contrast on 10/04/2021  Technique: Multiplanar multisequence MR imaging of the brain was performed without intravenous contrast.  Formal interpretation by Radiology:  No evidence of acute or chronic intracranial pathology.  Independently reviewed radiological imaging by  Suraj Ravi MD. MPH. Behavioral Neurologist  T1: No significant cortical atrophy with age-appropriate changes. Subcortical nuclei and hippocampi are without significant atrophy.  T2/FLAIR: No Significant hyperintensities appreciated on MRI T2/FLAIR  DWI/ADC: No Significant DWI hyperintensities/hypointensities. No ADC correlation.  SWI/GRE: No Significant hypointensities to suggest cortical/subcortical hemosiderin deposition.  Impression: : Normal Brain Imaging.    MRI brain/head without contrast on 9/6/2012  Formal interpretation by Radiology:  not available  Independently reviewed radiological imaging by Suraj Ravi MD. MPH. Behavioral Neurologist  T1: No significant cortical atrophy with age-appropriate changes. Subcortical nuclei and hippocampi are without significant atrophy.  T2/FLAIR: No Significant hyperintensities appreciated on MRI T2/FLAIR  DWI/ADC: No Significant DWI hyperintensities/hypointensities. No ADC correlation.  SWI/GRE: No Significant hypointensities to suggest cortical/subcortical hemosiderin deposition.  Impression: : Normal Brain Imaging.    CT brain/head without contrast on 01/01/2019  Formal interpretation by Radiology:  not available  Independently reviewed radiological imaging by Suraj Ravi MD. MPH. Behavioral Neurologist  Impression: : No significant cortical atrophy with age-appropriate changes. Subcortical nuclei and hippocampi are without significant atrophy.     Procedures:    Electrocardiogram on 12/16/2021  Formal interpretation:  Vent. Rate : 067 BPM     Atrial Rate : 067 BPM    P-R Int : 148 ms          QRS Dur : 084 ms     QT Int : 398 ms       P-R-T Axes : 061 043 060 degrees    QTc Int : 420 ms Normal sinus rhythm Possible Left atrial enlargement Nonspecific ST and/or T wave abnormalities Abnormal ECG  Independently reviewed Electrocardiogram by Suraj Ravi MD. MPH. Behavioral Neurologist  Impression: : Received ECG has no evidence of sinus node  disease. HR (>=50-60). Prolonged ME interval (>0.22 s). Broad QRS complex (> 0.12 s).     Clinical Summary:     The patient is a 69-year-old right-handed female with a relevant past medical history of Breast CA s/p B mastectomy on letrozole, Depression/Anxiety, who presents reporting a 4-year history of gradually progressive neurocognitive impairment.       The clinical history is suggestive of:  Executive Impairment: Working Memory  Language Impairment: Energization  Psychiatric Impairment: Neurovegetative, Social Coherence, Signal-Noise Dysregulation, Mood Regulation, Stimulation Dysregulation  The neurological examination is significant for:  Normal Neurological Examination  The neurocognitive battery is significant (based on age and education) for:  Subjective Cognitive impairment - Questionable Visuospatial Impairment: visuospatial construction.  BEHAV5+ 2/6: See ROS section for a full description  The neurologically relevant imaging is significant for  MRI brain/head without contrast (10/04/2021): Normal Brain Imaging.  MRI brain/head without contrast (9/6/2012): Normal Brain Imaging.  CT brain/head without contrast (01/01/2019): No significant cortical atrophy with age-appropriate changes. Subcortical nuclei and hippocampi are without significant atrophy.        Assessment:        The patient's clinical presentation is amnestic predominant subjective cognitive impairment insufficient to interfere with activities of daily living (CDR-SOB: 0.5 - Questionable cognitive impairment).  The neurobehavioral effects of aromatase inhibitors (AI) likely depend upon when during the lifespan such drugs are administered. Post-menopausal women treated with such drugs are more likely to show memory deficits, increased pain response, and other emotional responses. That said, while great effort has been invested in studying cognitive dysfunction and AI therapy, controlling confounders such as prior chemotherapy received and  having breast cancer is necessary to fully determine whether any association exists.At present, all neurodegenerative diseases can only be diagnosed with 100% certainty through a brain autopsy. The suspected neuropathology underlying the patient's neurocognitive impairment is most likely primarily due to Vascular Contributions to Cognitive Impairment and Dementia.  Plasma Protein Biomarkers: [02/23/2024] Neurofilament Light Chain (Nfl): 9.5.  Cerebrospinal Fluid Protein Biomarkers: [02/23/2024] Phospho-Tau (181P): 0.67. [06/15/2022] NSE: 19 (H). [06/15/2022] NSE: 19 (H). p-Tau/Abeta42: 0.015 (Negative). Abeta42: 888 (Positive). Total Tau: 169 (Negative). Phospho-Tau (181P): 13 (Negative).  There are no dermatological protein biomarkers available on record.  NEGATIVE - Sequence analysis and deletion/duplication testing of the 57 genes listed in the Genes Analyzed section. [facility] Hereditary Parkinson Disease and Parkinsonism Panel [facility] Hereditary Amyotrophic Lateral Sclerosis, Frontotemporal Dementia and Alzheimer Disease Panel     The patient presents with a fluctuating four-year history of progressive cognitive impairment. Serum and cerebrospinal fluid biomarkers do not support an Alzheimer's disease-related process. The patient continues to express concerns about subjective cognitive impairment and family history. The ongoing fluctuations appear to be associated with insomnia and polypharmacy. We recommend discontinuing trazodone, increasing Belsomra, and focusing on sleep hygiene practices.An annual screening for peripheral biomarkers of degeneration is advised to monitor the patient's condition closely. The clinical presentation is likely consistent with mild vascular changes contributing to cognitive impairment, potentially related to aromatase inhibitor therapy. This approach aims to address and mitigate factors contributing to the patient's cognitive symptoms, while monitoring for any signs of  neurodegenerative processes.     The observations made above, were discussed with the patient. We have discussed the additional diagnostic(s) and/or managenent below.     Care Management Plan:    #Diagnostic Screening for reversible forms of neurocognitive disorders  We recommend screening for reversible causes of neurocognitive impairment with plasma laboratories  We have ordered plasma CBC, CMP, Vitamins (B1, B9, B12), Mg, RPR, MMA, TSH, T4, Nfl, ptau-181 serum  #Optimize Neurocognitive Impairment and Quality  We have discussed the MIND Diet and other lifestyle behavior that may help maintain brain health.  We have provided written/digital reading material  No indication for donepezil at this time.  #Optimize Behavioral Management and Quality.  No indication for memantine at this time  #Optimize Sleep Hygiene and Quality  We discussed and recommended additional diagnostic/management of sleep disorder to optimize brain health and longevity.  Continue CPAP  Continue Belsomra 20 mg q.h.s.  Start Relaxium q.h.s. may consider over-the-counter CBD / THC gummies p.r.n.  #Optimize Cerebrovascular Health.  The patient has a documented history of hyperlipidemia and/or hypercholesteremia with long-term complications such as cerebrovascular disease, peripheral vascular disease, and/or aortic atherosclerosis. Collectively these risk factors may contribute to cerebral atherosclerosis, and cerebral hypoperfusion compounded neurocognitive disorder. We discussed maximizing cerebrovascular-related medical therapy, including but not limited to cholesterol medications and antiplatelet agents. We have discussed the value of aggressively controlling vascular risk factors like hypertension, hyperlipidemia, and Diabetes SBP<130, LDL<100, and A1C<7.0. We discussed the need to optimize lifestyle choices, including a heart-healthy diet (e.g., Mediterranean or DASH), increased cardiovascular exercise (goal 150 minutes of moderate-intensity  per week), and staying cognitively and socially active.  Continue aspirin 81 mg  #Behavioral/Environmental Strategies  We recommend engaging in activities that stimulate cognitively and socially while avoiding excessive stimulation and fatigue in overwhelmingly complex situations.  We recommend integrating routine and schedule into your daily life. https://www.alzheimersproject.org/news/the-importance-of-routine-and-familiarity-to-persons-with-dementia/  #Health Maintenance/Lifestyle Advice  We have discussed the value in aggressively controlling vascular risk factors like hypertension, hyperlipidemia, and Diabetes SBP<130, LDL<100, A1C<7.0.  We discussed the need to optimize lifestyle choices including a heart-healthy diet (e.g., Mediterranean or DASH), increased cardiovascular exercise (goal 150 minutes of moderate-intensity per week), and stay cognitively and socially active.  #Support  We all need support sometimes. Get easy access to local resources, community programs, and services. https://www.communityresourcefinder.org/  Learn more about Cognitive Impairment in [place]: https://www.alz.org/professionals/public-health/state-overview[place]  #Follow up:  Follow-up as needed.    Thank you for allowing us to participate in the care of your patient. Please do not hesitate to contact us with any questions or concerns.     It was a pleasure seeing The patient and we look forward to seeing them at their follow-up visit.     This note is dictated on M*Modal Fluency Direct word recognition program. There are word recognition mistakes that are occasionally missed on review.       Scheduled Follow-up :  No future appointments.    After Visit Medication List :     Medication List            Accurate as of March 28, 2024  1:58 PM. If you have any questions, ask your nurse or doctor.                CONTINUE taking these medications      acetaminophen 650 MG Tbsr  Commonly known as: TYLENOL     ALPRAZolam 0.25 MG  tablet  Commonly known as: XANAX  Take 1 tablet (0.25 mg total) by mouth every evening.     B-COMPLEX PLUS VIT C (CALCIUM) 300 mg-150 mg calcium Tab  Generic drug: vit B comp with C-calcium carb     BELSOMRA 20 mg Tab  Generic drug: suvorexant  TAKE 1/2 TABLET BY MOUTH AT BEDTIME     buPROPion 150 MG TB24 tablet  Commonly known as: WELLBUTRIN XL  Take 1 tablet (150 mg total) by mouth once daily.     cholecalciferol (vitamin D3) 125 mcg (5,000 unit) capsule  TAKE 1 CAPSULE BY MOUTH EVERY DAY     cyclobenzaprine 5 MG tablet  Commonly known as: FLEXERIL  TAKE 1 TABLET(5 MG) BY MOUTH EVERY NIGHT AS NEEDED FOR MUSCLE SPASMS     losartan 25 MG tablet  Commonly known as: COZAAR  Take 1 tablet (25 mg total) by mouth once daily.     rosuvastatin 20 MG tablet  Commonly known as: CRESTOR  Take 1 tablet (20 mg total) by mouth once daily.     traZODone 100 MG tablet  Commonly known as: DESYREL  Take 1 tablet (100 mg total) by mouth nightly as needed for Insomnia.     venlafaxine 75 MG 24 hr capsule  Commonly known as: EFFEXOR-XR  Take 2 capsules (150 mg total) by mouth once daily.              Signing Physician:  Suraj Obrien MD    Billing:       -----------------------------------------------------------------------------  I performed this consultation using real-time Telehealth tools, including a live video connection between my location and the patient's location (their home within the [facility]). Prior to initiating the consultation, I obtained informed verbal consent to perform this consultation using Telehealth tools and answered all the questions about the Telehealth interaction. The participants understand that only a limited neurological exam and limited neuropsychological testing can be performed using Telehealth tools.  I spent a total of 45 minutes (time-in: 13:45 PM; time-out: 14:30 PM) on 2024-03-28, virtually face-to-face with the patient and caregiver(s), >50% of that time was spent counseling regarding  the symptoms, treatment plan, risks, therapeutic options, lifestyle modifications, and/or safety issues for the diagnoses above.  10/14 Review of Systems completed and is negative except as stated above in HPI (Systems reviewed: Const, Eyes, ENT, Resp, CV, GI, , MSK, Skin, Neuro)  I reviewed previous labs for a total of 10 minutes on 2024-03-28. This is directly related to the face-to-face encounter. Review of previous labs was performed all negative except as stated above in HPI  I performed a neurobehavioral status examination that included a clinical assessment of thinking, reasoning, and judgment to ensure a comprehensive approach in managing the complex and evolving needs of the patient's neurocognitive condition. Please see above HPI and ROS for full details. This exam was performed on 2024-03-28 and included 14 minutes spent on direct face-to-face clinical observation and interview with the patient and 18 minutes spent interpreting test results and preparing the report. The total time of 32 minutes spent on the neurobehavioral status examination is not included in the time spent on evaluation and management coding.  Total Billing time spent on encounter/documentation for this patient's evaluation and management, not including the neurobehavioral status examination: 41 minutes.

## 2024-04-27 ENCOUNTER — PATIENT MESSAGE (OUTPATIENT)
Dept: NEUROLOGY | Facility: CLINIC | Age: 69
End: 2024-04-27
Payer: MEDICARE

## 2024-04-29 RX ORDER — SUVOREXANT 20 MG/1
0.5 TABLET, FILM COATED ORAL NIGHTLY
Qty: 30 TABLET | Refills: 5 | Status: SHIPPED | OUTPATIENT
Start: 2024-04-29 | End: 2024-05-02 | Stop reason: SDUPTHER

## 2024-04-29 NOTE — TELEPHONE ENCOUNTER
Spoke with pharmacy tech at [facility]. Was informed that it's too early to refill belsomra for coverage. Insurance will be able to cover on 5/02/24. Last fill was on 3/15/24.

## 2024-05-02 DIAGNOSIS — R41.89 SUBJECTIVE COGNITIVE IMPAIRMENT: ICD-10-CM

## 2024-05-02 DIAGNOSIS — G47.00 INSOMNIA, UNSPECIFIED TYPE: ICD-10-CM

## 2024-05-02 NOTE — TELEPHONE ENCOUNTER
----- Message from Amanda Osei MA sent at 5/1/2024  1:21 PM CDT -----  Contact: @[phone]    ----- Message -----  From: Sandrine Medina  Sent: 5/1/2024   1:18 PM CDT  To: Camille BARROW Staff    Pt called in regards to speaking with Carson she did not say about what  .....Please call and adv @[phone]

## 2024-05-02 NOTE — TELEPHONE ENCOUNTER
Spoke with patient and is requesting MD to send belsomra to the pharmacy instructing to take 'Sig - Route: Take 1 tablets by mouth every evening. - Oral'    Patient states that both MD and patient agreed to change 0.5 tablet to 1 whole pill of a 20mg tab and still has not received this.    Patient was also upset that she's been waiting this long for a response. Informed patient that I sent a message to patient and it was sent on 4/29 in separate thread/encounter when patient reached out via patient portal and informed her that insurance will not refill belsomra. She states that 'this is new information'.

## 2024-05-03 RX ORDER — SUVOREXANT 20 MG/1
1 TABLET, FILM COATED ORAL NIGHTLY
Qty: 30 TABLET | Refills: 5 | Status: SHIPPED | OUTPATIENT
Start: 2024-05-03

## 2024-06-10 ENCOUNTER — PATIENT MESSAGE (OUTPATIENT)
Dept: INTERNAL MEDICINE | Facility: CLINIC | Age: 69
End: 2024-06-10
Payer: MEDICARE

## 2024-07-08 ENCOUNTER — TELEPHONE (OUTPATIENT)
Dept: CARDIOLOGY | Facility: CLINIC | Age: 69
End: 2024-07-08
Payer: MEDICARE

## 2024-07-08 NOTE — TELEPHONE ENCOUNTER
----- Message from Lynda Schmid sent at 7/8/2024 12:42 PM CDT -----  Regarding: Concerns  Pt [phone] called to [facility] a f/u appt and then she began to tell me issues she experienced last night. She had pain under her left arm near her breast. She also shoulder pain and numbness down the left arm to her fingertips that lasted for hours, but she feels fine today. Please give her a call at the number listed.    Thanks

## 2024-07-10 ENCOUNTER — TELEPHONE (OUTPATIENT)
Dept: CARDIOLOGY | Facility: CLINIC | Age: 69
End: 2024-07-10
Payer: MEDICARE

## 2024-07-10 NOTE — TELEPHONE ENCOUNTER
Call patient to reschedule appt. no answer message was left for patient to call the office back if new date and time does not work RJ      ----- Message from Radha Cook MA sent at 7/10/2024 11:51 AM CDT -----  The patient would like to talk to you about an appointment  January is too late please call [phone]. Thank you.

## 2024-07-30 ENCOUNTER — OFFICE VISIT (OUTPATIENT)
Dept: CARDIOLOGY | Facility: CLINIC | Age: 69
End: 2024-07-30
Payer: COMMERCIAL

## 2024-07-30 VITALS
SYSTOLIC BLOOD PRESSURE: 134 MMHG | DIASTOLIC BLOOD PRESSURE: 82 MMHG | HEIGHT: 66 IN | HEART RATE: 75 BPM | BODY MASS INDEX: 26.4 KG/M2 | WEIGHT: 164.25 LBS

## 2024-07-30 DIAGNOSIS — I10 HYPERTENSION, UNSPECIFIED TYPE: Primary | ICD-10-CM

## 2024-07-30 DIAGNOSIS — I10 PRIMARY HYPERTENSION: ICD-10-CM

## 2024-07-30 DIAGNOSIS — I10 HYPERTENSION, UNSPECIFIED TYPE: ICD-10-CM

## 2024-07-30 DIAGNOSIS — R06.02 SOB (SHORTNESS OF BREATH): ICD-10-CM

## 2024-07-30 DIAGNOSIS — E78.2 MIXED HYPERLIPIDEMIA: ICD-10-CM

## 2024-07-30 DIAGNOSIS — I70.0 AORTIC ATHEROSCLEROSIS: Primary | ICD-10-CM

## 2024-07-30 PROCEDURE — 99999 PR PBB SHADOW E&M-EST. PATIENT-LVL III: CPT | Mod: PBBFAC,,, | Performed by: INTERNAL MEDICINE

## 2024-07-30 NOTE — PROGRESS NOTES
HISTORY:    69-year-old female with a history of breast cancer status post bilateral mastectomy '17, hepatic lobectomy for benign mass '05, mitral valve prolapse, hypertension, aortic atherosclerosis, DDD s/p fusion '22 presenting for follow-up.      Last seen by me in early 2023. Initially evaluated by me in late 2021 prior to anticipated back surgery. She tolerated this without issue and has done well with a marked improvement in her back pain. She had a repeat procedure that was successful as well.     The patient denies any symptoms of chest pain. Does get winded with heavy exertion, which is different than 5 years ago. Had an episode of left sided numbness that lasted hours and resolved. No recurrences.      She is very active in her life without any CV limitation. She completes ADLs. She has 18 grandchildren aged 3-20 yo and at any given time maybe taking care of multiple children. She has no issues keeping up with them. She is limited by orthopedic issues more so than SOB.      She is a retired  and .     She does have a h/o MVP. When she was younger she had a chronic chest pain syndrome with multiple TTE and stress tests at an OSH system that were unremarkable. She has had no issues with any of these symptoms in the last 2 decades.     The patient denies any previous history of myocardial infarction, coronary artery disease, peripheral arterial disease, stroke, congestive heart failure, or cardiomyopathy. Nonsmoker and no FH of premature CAD.     She currently tolerates losartan 25x1 and rosuvastatin 20x1.     PHYSICAL EXAM:    Vitals:    07/30/24 1133   BP: 134/82   Pulse: 75       NAD, A+Ox3.  No jvd, no bruit.  RRR nml s1,s2. No murmurs.  CTA B no wheezes or crackles.  2+ B radial and 1+ B DP/PT. No edema.    LABS/STUDIES (imaging reviewed during clinic visit):    February 2024 CBC and CMP normal.  2023 /HDL 68/LDL 70/.  A1c and TSH normal.    EKG July  2024 demonstrates sinus rhythm with no Q-waves or ST changes.  Chest x-ray December 2021 clear with normal cardiac size.  CT Chest December 2023 aortic atherosclerosis.  Normal heart size.  No coronary artery calcification.    MRI Brain August 2021 Unremarkable     ASSESSMENT & PLAN:    1. Aortic atherosclerosis    2. Mixed hyperlipidemia    3. Primary hypertension    4. SOB (shortness of breath)          Orders Placed This Encounter    Exercise Stress - EKG    Echo        Some SOB, non-specific. Has recently started exercising, walking. Episode of left arm numbness. Reasonable to check a TST and TTE.    Tolerating rosuvastatin 20 x 1 w LDL 70.  Pre statin LDL is 138.    Bps controlled on losartan 25 x 1.     Follow up in about 6 months (around 1/30/2025).      Godwin Alexandre MD

## 2024-07-31 ENCOUNTER — OFFICE VISIT (OUTPATIENT)
Dept: INTERNAL MEDICINE | Facility: CLINIC | Age: 69
End: 2024-07-31
Payer: COMMERCIAL

## 2024-07-31 VITALS
HEIGHT: 66 IN | OXYGEN SATURATION: 96 % | WEIGHT: 165.38 LBS | HEART RATE: 71 BPM | SYSTOLIC BLOOD PRESSURE: 106 MMHG | DIASTOLIC BLOOD PRESSURE: 78 MMHG | BODY MASS INDEX: 26.58 KG/M2

## 2024-07-31 DIAGNOSIS — Z17.0 MALIGNANT NEOPLASM OF RIGHT BREAST, STAGE 1, ESTROGEN RECEPTOR POSITIVE: ICD-10-CM

## 2024-07-31 DIAGNOSIS — I70.0 AORTIC ATHEROSCLEROSIS: ICD-10-CM

## 2024-07-31 DIAGNOSIS — R73.03 PREDIABETES: ICD-10-CM

## 2024-07-31 DIAGNOSIS — F32.9 MAJOR DEPRESSION, CHRONIC: ICD-10-CM

## 2024-07-31 DIAGNOSIS — M85.89 OSTEOPENIA OF MULTIPLE SITES: ICD-10-CM

## 2024-07-31 DIAGNOSIS — M67.431 GANGLION CYST OF DORSUM OF RIGHT WRIST: ICD-10-CM

## 2024-07-31 DIAGNOSIS — Z90.13 HISTORY OF BILATERAL MASTECTOMY: ICD-10-CM

## 2024-07-31 DIAGNOSIS — C50.911 MALIGNANT NEOPLASM OF RIGHT BREAST, STAGE 1, ESTROGEN RECEPTOR POSITIVE: ICD-10-CM

## 2024-07-31 DIAGNOSIS — K52.9 CHRONIC DIARRHEA: ICD-10-CM

## 2024-07-31 DIAGNOSIS — E78.2 MIXED HYPERLIPIDEMIA: ICD-10-CM

## 2024-07-31 DIAGNOSIS — I10 PRIMARY HYPERTENSION: ICD-10-CM

## 2024-07-31 DIAGNOSIS — R20.2 ARM PARESTHESIA, LEFT: ICD-10-CM

## 2024-07-31 DIAGNOSIS — R22.1 MASS OF RIGHT SIDE OF NECK: Primary | ICD-10-CM

## 2024-07-31 PROCEDURE — 3074F SYST BP LT 130 MM HG: CPT | Mod: CPTII,S$GLB,, | Performed by: NURSE PRACTITIONER

## 2024-07-31 PROCEDURE — 99999 PR PBB SHADOW E&M-EST. PATIENT-LVL IV: CPT | Mod: PBBFAC,,, | Performed by: NURSE PRACTITIONER

## 2024-07-31 PROCEDURE — 4010F ACE/ARB THERAPY RXD/TAKEN: CPT | Mod: CPTII,S$GLB,, | Performed by: NURSE PRACTITIONER

## 2024-07-31 PROCEDURE — 1101F PT FALLS ASSESS-DOCD LE1/YR: CPT | Mod: CPTII,S$GLB,, | Performed by: NURSE PRACTITIONER

## 2024-07-31 PROCEDURE — 99214 OFFICE O/P EST MOD 30 MIN: CPT | Mod: S$GLB,,, | Performed by: NURSE PRACTITIONER

## 2024-07-31 PROCEDURE — 3288F FALL RISK ASSESSMENT DOCD: CPT | Mod: CPTII,S$GLB,, | Performed by: NURSE PRACTITIONER

## 2024-07-31 PROCEDURE — 3008F BODY MASS INDEX DOCD: CPT | Mod: CPTII,S$GLB,, | Performed by: NURSE PRACTITIONER

## 2024-07-31 PROCEDURE — 1159F MED LIST DOCD IN RCRD: CPT | Mod: CPTII,S$GLB,, | Performed by: NURSE PRACTITIONER

## 2024-07-31 PROCEDURE — 1126F AMNT PAIN NOTED NONE PRSNT: CPT | Mod: CPTII,S$GLB,, | Performed by: NURSE PRACTITIONER

## 2024-07-31 PROCEDURE — 3078F DIAST BP <80 MM HG: CPT | Mod: CPTII,S$GLB,, | Performed by: NURSE PRACTITIONER

## 2024-07-31 RX ORDER — TRAZODONE HYDROCHLORIDE 100 MG/1
TABLET ORAL
COMMUNITY
Start: 2024-07-17

## 2024-07-31 NOTE — PROGRESS NOTES
"Internal Medicine Follow up       CHIEF COMPLAINT     The patient, Nguyen Ivan, who is a 69 y.o. female  with HTN, HLD, preDM, Breast CA s/p B mastectomy 11/2017 and s/p breast reconstruction, fam hx breast CA, MVP (seen by Cards in the past at  and told it was no longer an issue, last echo many years ago), depression, hx liver lobectomy 2/2 "a growth on her liver" (path benign per pt and reports f/u imaging had been normal), baseline hearing impaired, chronic neck and back pain c hx herniated discs followed by Dr. Fairchild in the past but now seeing Dr. Hyman presents for a follow up.    HPI   She is an established pt of Dr Biswas - last seen for annual 1/10/2024      To review, has been followed by Dr. Alberta Hyman for chronic low back pain.  Had some recent issues c recurrent neck and back pain in Nov and was rx'ed Flexeril and did PT but stopped PT for now as she is awaiting and MRI from her pain mgmt dr.     Hx Breast CA followed by Dr. Betty Meyer.  Had CT scan last mo and monitoring liver and kidney lesion which has been stable.  Breast lump - felt by Dr Meyer.    MRI abd pelvis   1.   There is no primary malignancy in the abdomen or pelvis. There is no evidence of metastatic disease.   2.   There are one or two tiny cysts in the pancreas is detailed above of doubtful clinical significance, likely tiny sidebranch IPMNs.   CT chest -   IMPRESSION:   Stable exam. Postsurgical changes. There is no evidence of metastatic disease. There is no acute abnormality.   Will repeat in 1 year     Has been on Effexor and Wellbutrin for mood c prn Xanax which she rarely takes.  Also takes Trazodone for sleep.  States Trazodone alone does not always work but takes it c a THC gummy which works well.    Insomnia- weaned off trazodone and started belsomra     Mild neurocog decline- seen by neuropscyh- 3/28/2024    Lump to the right side of neck - comes and goes located to the angle of the jaw - lasts for 4-5 " days but goes away   Not currently present   Possibly r/t earache     Right wrist cyst - x1 month - no pain not getting bigger     Right arm numbness 1 month ago lasting 2 hours- seen by cardiology for this yesterday   Schedule for stress test and echo     Pre-Dm- would like to lose weight and concerned about getting DM2 - asking about use of GLP-1s or another medication to lose weight   Lab Results   Component Value Date    HGBA1C 5.8 (H) 2023           Past Medical History:  Past Medical History:   Diagnosis Date    Breast cancer 2017    Birads 4  Bilateral Mastectomy     Depression     Family history of breast cancer in sister     age 45,lumpectomy,XRT    H/O abnormal mammogram 10/30/2017    Birads 4    Mitral valve prolapse        Past Surgical History:   Procedure Laterality Date    BILATERAL MASTECTOMY Bilateral 2017    BREAST LUMPECTOMY  2017    atypical ductal hyperplasia     BREAST RECONSTRUCTION Bilateral 3/2018 & 2018    CHOLECYSTECTOMY  2005    COLONOSCOPY      COLONOSCOPY N/A 2023    Procedure: COLONOSCOPY;  Surgeon: Ozzy Dela Cruz MD;  Location: [facility] ENDO (4TH FLR);  Service: Colon and Rectal;  Laterality: N/A;   referred by Dawn Black, RADAMES/PEG/instr. to portal-st    DILATION AND CURETTAGE OF UTERUS      Missed Ab    EYE SURGERY  2017    cataracts    HERNIA REPAIR      LIVER LOBECTOMY  2005    REDUCTION OF BOTH BREASTS  2009    TONSILLECTOMY  1970    TUBAL LIGATION  1985    with last delivery     UMBILICAL HERNIA REPAIR  2006        Family History   Problem Relation Name Age of Onset    Heart attack Father Mauro Carbone     Alcohol abuse Father Mauro Carbone             Depression Father Mauro Carbone     Dementia Mother Rosana Chuck Carbone     Alzheimer's disease Mother Rosana Chuck Carbone     Arthritis Mother Rosana Chuck Carbone             Cancer Mother Rosana Chuck Carbone     Hearing loss Mother Rosana Miramontessung Carbone      Miscarriages / Stillbirths Mother Rosana Carbone     Alcohol abuse Brother Carlitos Carbone             Kidney disease Brother Carlitos Carbone     Alcohol abuse Brother Dirk Carbone     Alcohol abuse Brother Jayme Carbone     Alcohol abuse Brother Mauro Carbone Mathew             Drug abuse Brother Mauro Carbone Mathew     Kidney disease Brother Mauro Carbone Junior     Breast cancer Sister Pippa Jose 45        XRT    Cancer Sister Pippa Jose     Alcohol abuse Sister Josie Sparkson     Miscarriages / Stillbirths Sister Amanda Urbina     Dementia Sister Amanda Urbina     Breast cancer Maternal Aunt      Breast cancer Paternal Aunt      Colon cancer Neg Hx      Ovarian cancer Neg Hx      Uterine cancer Neg Hx      Cervical cancer Neg Hx      Prostate cancer Neg Hx          Social History     Socioeconomic History    Marital status:    Tobacco Use    Smoking status: Never    Smokeless tobacco: Never   Substance and Sexual Activity    Alcohol use: Yes     Alcohol/week: 0.0 - 4.0 standard drinks of alcohol     Comment: Social     Drug use: Yes     Types: Marijuana     Comment: [facility] gummies/medical marijuana    Sexual activity: Yes     Partners: Male     Birth control/protection: Post-menopausal, See Surgical Hx     Comment: tubal ligation 198     Social Determinants of Health     Food Insecurity: No Food Insecurity (2024)    Hunger Vital Sign     Worried About Running Out of Food in the Last Year: Never true     Ran Out of Food in the Last Year: Never true   Transportation Needs: No Transportation Needs (2024)    PRAPARE - Transportation     Lack of Transportation (Medical): No     Lack of Transportation (Non-Medical): No   Physical Activity: Inactive (2024)    Exercise Vital Sign     Days of Exercise per Week: 0 days     Minutes of Exercise per Session: 30 min   Stress: Stress Concern Present (2024)    Citizen of [place] [employer] Occupational  Health - Occupational Stress Questionnaire     Feeling of Stress : To some extent   Housing Stability: Low Risk  (2/20/2024)    Housing Stability Vital Sign     Unable to Pay for Housing in the Last Year: No     Number of Places Lived in the Last Year: 1     Unstable Housing in the Last Year: No        Social History     Tobacco Use   Smoking Status Never   Smokeless Tobacco Never        Allergies as of 07/31/2024    (No Known Allergies)          Home Medications:  Prior to Admission medications    Medication Sig Start Date End Date Taking? Authorizing Provider   acetaminophen (TYLENOL) 650 MG TbSR  7/1/21   Provider, Historical   ALPRAZolam (XANAX) 0.25 MG tablet Take 1 tablet (0.25 mg total) by mouth every evening. 2/14/24   Teresa Biswas MD   B-COMPLEX PLUS VIT C, CALCIUM, 300 mg-150 mg calcium Tab Take 1 tablet by mouth. 8/10/23   Provider, Historical   buPROPion (WELLBUTRIN XL) 150 MG TB24 tablet Take 1 tablet (150 mg total) by mouth once daily. 7/31/23 7/30/24  Teresa Biswas MD   cholecalciferol, vitamin D3, 125 mcg (5,000 unit) capsule TAKE 1 CAPSULE BY MOUTH EVERY DAY 11/7/23   Teresa Biswas MD   cyanocobalamin, vitamin B-12, 5,000 mcg Subl Place one under tongue once a day for 1 month, then continue to take under tongue per week 3/28/24   Suraj Obrien MD   losartan (COZAAR) 25 MG tablet Take 1 tablet (25 mg total) by mouth once daily. 1/10/24   Teresa Biswas MD   rosuvastatin (CRESTOR) 20 MG tablet Take 1 tablet (20 mg total) by mouth once daily. 1/10/24   Teresa Biswas MD   suvorexant (BELSOMRA) 20 mg Tab Take 1 tablet by mouth every evening. 5/3/24   Suraj Obrien MD   venlafaxine (EFFEXOR-XR) 75 MG 24 hr capsule Take 2 capsules (150 mg total) by mouth once daily. 1/10/24   Teresa Biswas MD       Review of Systems:  Review of Systems   Constitutional:  Positive for diaphoresis. Negative for chills, fatigue and fever.   HENT:  Negative for congestion, sinus pressure, sinus pain and sore throat.    Respiratory:   "Negative for cough, shortness of breath and wheezing.    Cardiovascular:  Negative for chest pain and palpitations.   Gastrointestinal:  Negative for abdominal pain, constipation, diarrhea, nausea and vomiting.   Musculoskeletal:  Positive for arthralgias.   Neurological:  Positive for numbness. Negative for dizziness, light-headedness and headaches.       Health Maintainence:   Immunizations:  Health Maintenance         Date Due Completion Date    RSV Vaccine (Age 60+ and Pregnant patients) (1 - 1-dose 60+ series) Never done ---    COVID-19 Vaccine (6 - 2023-24 season) 09/01/2023 1/18/2023    Influenza Vaccine (1) 09/01/2024 1/10/2024    Hemoglobin A1c (Prediabetes) 12/07/2024 12/7/2023    High Dose Statin 07/31/2025 7/31/2024    DEXA Scan 08/01/2025 8/1/2023    Lipid Panel 08/01/2028 8/1/2023    TETANUS VACCINE 05/09/2029 5/9/2019    Colorectal Cancer Screening 06/08/2033 6/8/2023    Override on 2/1/2011: Done             PHYSICAL EXAM     /78 (BP Location: Left arm, Patient Position: Sitting, BP Method: Medium (Manual))   Pulse 71   Ht 5' 6" (1.676 m)   Wt 75 kg (165 lb 5.5 oz)   LMP  (LMP Unknown) Comment:   2003  SpO2 96%   BMI 26.69 kg/m²  Body mass index is 26.69 kg/m².    Physical Exam  Vitals reviewed.   Constitutional:       Appearance: She is well-developed.   HENT:      Head: Normocephalic.      Right Ear: External ear normal.      Left Ear: External ear normal.      Nose: Nose normal.      Mouth/Throat:      Pharynx: No oropharyngeal exudate.   Eyes:      Pupils: Pupils are equal, round, and reactive to light.   Neck:      Thyroid: No thyromegaly.      Vascular: No JVD.      Trachea: No tracheal deviation.   Cardiovascular:      Rate and Rhythm: Normal rate and regular rhythm.      Heart sounds: Normal heart sounds. No murmur heard.     No friction rub. No gallop.   Pulmonary:      Effort: Pulmonary effort is normal. No respiratory distress.      Breath sounds: Normal breath sounds. No " wheezing or rales.   Abdominal:      General: Bowel sounds are normal. There is no distension.      Palpations: Abdomen is soft.      Tenderness: There is no abdominal tenderness.   Musculoskeletal:         General: No tenderness. Normal range of motion.      Cervical back: Neck supple.   Lymphadenopathy:      Cervical: No cervical adenopathy.   Skin:     General: Skin is warm and dry.      Capillary Refill: Capillary refill takes less than 2 seconds.      Findings: No rash.   Neurological:      General: No focal deficit present.      Mental Status: She is alert and oriented to person, place, and time.      Cranial Nerves: Cranial nerves 2-12 are intact.      Sensory: Sensation is intact.      Motor: Motor function is intact.      Coordination: Coordination is intact.   Psychiatric:         Behavior: Behavior normal.       LABS     Lab Results   Component Value Date    HGBA1C 5.8 (H) 12/07/2023     CMP  Sodium   Date Value Ref Range Status   02/23/2024 142 136 - 145 mmol/L Final     Potassium   Date Value Ref Range Status   02/23/2024 3.8 3.5 - 5.1 mmol/L Final     Chloride   Date Value Ref Range Status   02/23/2024 106 95 - 110 mmol/L Final     CO2   Date Value Ref Range Status   02/23/2024 27 23 - 29 mmol/L Final     Glucose   Date Value Ref Range Status   02/23/2024 138 (H) 70 - 110 mg/dL Final     BUN   Date Value Ref Range Status   02/23/2024 13 8 - 23 mg/dL Final     Creatinine   Date Value Ref Range Status   02/23/2024 0.8 0.5 - 1.4 mg/dL Final     Calcium   Date Value Ref Range Status   02/23/2024 9.7 8.7 - 10.5 mg/dL Final     Total Protein   Date Value Ref Range Status   02/23/2024 6.8 6.0 - 8.4 g/dL Final     Albumin   Date Value Ref Range Status   02/23/2024 3.9 3.5 - 5.2 g/dL Final     Total Bilirubin   Date Value Ref Range Status   02/23/2024 0.6 0.1 - 1.0 mg/dL Final     Comment:     For infants and newborns, interpretation of results should be based  on gestational age, weight and in agreement with  clinical  observations.    Premature Infant recommended reference ranges:  Up to 24 hours.............<8.0 mg/dL  Up to 48 hours............<12.0 mg/dL  3-5 days..................<15.0 mg/dL  6-29 days.................<15.0 mg/dL       Alkaline Phosphatase   Date Value Ref Range Status   02/23/2024 46 (L) 55 - 135 U/L Final     AST   Date Value Ref Range Status   02/23/2024 19 10 - 40 U/L Final     ALT   Date Value Ref Range Status   02/23/2024 23 10 - 44 U/L Final     Anion Gap   Date Value Ref Range Status   02/23/2024 9 8 - 16 mmol/L Final     eGFR if    Date Value Ref Range Status   12/16/2021 >60.0 >60 mL/min/1.73 m^2 Final     eGFR    Date Value Ref Range Status   01/21/2022 >105 >89 mL/min Final     eGFR if non    Date Value Ref Range Status   12/16/2021 >60.0 >60 mL/min/1.73 m^2 Final     Comment:     Calculation used to obtain the estimated glomerular filtration  rate (eGFR) is the CKD-EPI equation.        Lab Results   Component Value Date    WBC 5.63 02/23/2024    HGB 13.3 02/23/2024    HCT 42.8 02/23/2024    MCV 94 02/23/2024     02/23/2024     Lab Results   Component Value Date    CHOL 163 08/01/2023    CHOL 225 (H) 11/13/2020    CHOL 200 (H) 05/14/2019     Lab Results   Component Value Date    HDL 68 08/01/2023    HDL 58 11/13/2020    HDL 55 05/14/2019     Lab Results   Component Value Date    LDLCALC 70.0 08/01/2023    LDLCALC 138.0 11/13/2020    LDLCALC 120.0 05/14/2019     Lab Results   Component Value Date    TRIG 125 08/01/2023    TRIG 145 11/13/2020    TRIG 125 05/14/2019     Lab Results   Component Value Date    CHOLHDL 41.7 08/01/2023    CHOLHDL 25.8 11/13/2020    CHOLHDL 27.5 05/14/2019     Lab Results   Component Value Date    TSH 0.626 01/26/2023    [id number] 8.2 08/18/2021       ASSESSMENT/PLAN     Nguyen Keekaylajoannaezio is a 69 y.o. female    Mass of right side of neck- intermittent and not currently present. When/if it returns will send  for labs and u/s   -     CBC Auto Differential; Future; Expected date: 07/31/2024  -     US Soft Tissue Head Neck; Future; Expected date: 07/31/2024    Arm paresthesia, left- resolved. Will refer to neurology and advised to go to ED if this reoccurs     Ganglion cyst of dorsum of right wrist- new problem will monitor and refer to general surgery/hand surgery if it increases in size or pain     Major depression, chronic- stable. Will cont wellbutrin and xanax as needed     Primary hypertension- stable. Will cont losartan and low na diet     Mixed hyperlipidemia- stable. Will cont crestor     Aortic atherosclerosis- stable. Will cont Crestor     History of bilateral mastectomy/Malignant neoplasm of right breast, stage 1, estrogen receptor positive- stable. Will f/u with oncology     Prediabetes- stable. Will monitor A1c  Lab Results   Component Value Date    HGBA1C 5.8 (H) 12/07/2023     Chronic diarrhea- stable. Will monitor     Osteopenia of multiple sites- stable. Will f/u with dexa            Follow up with PCP    Dawn NAJERA, ALEXANDER, FNP-c   [facility]  2:38 PM

## 2024-08-03 DIAGNOSIS — F32.9 MAJOR DEPRESSION, CHRONIC: ICD-10-CM

## 2024-08-04 RX ORDER — BUPROPION HYDROCHLORIDE 150 MG/1
150 TABLET ORAL
Qty: 90 TABLET | Refills: 1 | Status: SHIPPED | OUTPATIENT
Start: 2024-08-04

## 2024-08-05 ENCOUNTER — LAB VISIT (OUTPATIENT)
Dept: LAB | Facility: HOSPITAL | Age: 69
End: 2024-08-05
Attending: NURSE PRACTITIONER
Payer: COMMERCIAL

## 2024-08-05 DIAGNOSIS — R22.1 MASS OF RIGHT SIDE OF NECK: ICD-10-CM

## 2024-08-05 LAB
BASOPHILS # BLD AUTO: 0.06 K/UL (ref 0–0.2)
BASOPHILS NFR BLD: 0.9 % (ref 0–1.9)
DIFFERENTIAL METHOD BLD: ABNORMAL
EOSINOPHIL # BLD AUTO: 0.1 K/UL (ref 0–0.5)
EOSINOPHIL NFR BLD: 1.7 % (ref 0–8)
ERYTHROCYTE [DISTWIDTH] IN BLOOD BY AUTOMATED COUNT: 13.2 % (ref 11.5–14.5)
HCT VFR BLD AUTO: 45 % (ref 37–48.5)
HGB BLD-MCNC: 14 G/DL (ref 12–16)
IMM GRANULOCYTES # BLD AUTO: 0.01 K/UL (ref 0–0.04)
IMM GRANULOCYTES NFR BLD AUTO: 0.2 % (ref 0–0.5)
LYMPHOCYTES # BLD AUTO: 1.8 K/UL (ref 1–4.8)
LYMPHOCYTES NFR BLD: 26.9 % (ref 18–48)
MCH RBC QN AUTO: 29.9 PG (ref 27–31)
MCHC RBC AUTO-ENTMCNC: 31.1 G/DL (ref 32–36)
MCV RBC AUTO: 96 FL (ref 82–98)
MONOCYTES # BLD AUTO: 0.4 K/UL (ref 0.3–1)
MONOCYTES NFR BLD: 6.4 % (ref 4–15)
NEUTROPHILS # BLD AUTO: 4.2 K/UL (ref 1.8–7.7)
NEUTROPHILS NFR BLD: 63.9 % (ref 38–73)
NRBC BLD-RTO: 0 /100 WBC
PLATELET # BLD AUTO: 266 K/UL (ref 150–450)
PMV BLD AUTO: 10.5 FL (ref 9.2–12.9)
RBC # BLD AUTO: 4.69 M/UL (ref 4–5.4)
WBC # BLD AUTO: 6.54 K/UL (ref 3.9–12.7)

## 2024-08-05 PROCEDURE — 85025 COMPLETE CBC W/AUTO DIFF WBC: CPT | Performed by: NURSE PRACTITIONER

## 2024-08-05 PROCEDURE — 36415 COLL VENOUS BLD VENIPUNCTURE: CPT | Mod: PO | Performed by: NURSE PRACTITIONER

## 2024-08-13 ENCOUNTER — PATIENT MESSAGE (OUTPATIENT)
Dept: INTERNAL MEDICINE | Facility: CLINIC | Age: 69
End: 2024-08-13
Payer: MEDICARE

## 2024-08-14 ENCOUNTER — HOSPITAL ENCOUNTER (OUTPATIENT)
Dept: CARDIOLOGY | Facility: HOSPITAL | Age: 69
Discharge: HOME | End: 2024-08-14
Attending: INTERNAL MEDICINE
Payer: COMMERCIAL

## 2024-08-14 ENCOUNTER — HOSPITAL ENCOUNTER (OUTPATIENT)
Dept: RADIOLOGY | Facility: HOSPITAL | Age: 69
Discharge: HOME | End: 2024-08-14
Attending: NURSE PRACTITIONER
Payer: COMMERCIAL

## 2024-08-14 VITALS
WEIGHT: 165 LBS | BODY MASS INDEX: 26.52 KG/M2 | SYSTOLIC BLOOD PRESSURE: 118 MMHG | DIASTOLIC BLOOD PRESSURE: 70 MMHG | HEIGHT: 66 IN | HEART RATE: 60 BPM

## 2024-08-14 DIAGNOSIS — R06.02 SOB (SHORTNESS OF BREATH): ICD-10-CM

## 2024-08-14 DIAGNOSIS — R22.1 MASS OF RIGHT SIDE OF NECK: ICD-10-CM

## 2024-08-14 LAB
ASCENDING AORTA: 3.01 CM
AV INDEX (PROSTH): 0.76
AV MEAN GRADIENT: 4 MMHG
AV PEAK GRADIENT: 9 MMHG
AV VALVE AREA BY VELOCITY RATIO: 2.27 CM²
AV VALVE AREA: 2.3 CM²
AV VELOCITY RATIO: 0.75
BSA FOR ECHO PROCEDURE: 1.87 M2
CV ECHO LV RWT: 0.33 CM
CV STRESS BASE HR: 62 BPM
DIASTOLIC BLOOD PRESSURE: 82 MMHG
DOP CALC AO PEAK VEL: 1.53 M/S
DOP CALC AO VTI: 34.66 CM
DOP CALC LVOT AREA: 3 CM2
DOP CALC LVOT DIAMETER: 1.96 CM
DOP CALC LVOT PEAK VEL: 1.15 M/S
DOP CALC LVOT STROKE VOLUME: 79.67 CM3
DOP CALCLVOT PEAK VEL VTI: 26.42 CM
E WAVE DECELERATION TIME: 262.63 MSEC
E/A RATIO: 1.11
E/E' RATIO: 10.19 M/S
ECHO LV POSTERIOR WALL: 0.73 CM (ref 0.6–1.1)
FRACTIONAL SHORTENING: 39 % (ref 28–44)
HR MV ECHO: 60 BPM
INTERVENTRICULAR SEPTUM: 0.7 CM (ref 0.6–1.1)
LA MAJOR: 3.59 CM
LA MINOR: 3.71 CM
LA WIDTH: 3.06 CM
LEFT ATRIUM SIZE: 3.47 CM
LEFT ATRIUM VOLUME INDEX MOD: 13.5 ML/M2
LEFT ATRIUM VOLUME INDEX: 17.9 ML/M2
LEFT ATRIUM VOLUME MOD: 24.84 CM3
LEFT ATRIUM VOLUME: 32.93 CM3
LEFT INTERNAL DIMENSION IN SYSTOLE: 2.75 CM (ref 2.1–4)
LEFT VENTRICLE DIASTOLIC VOLUME INDEX: 49.93 ML/M2
LEFT VENTRICLE DIASTOLIC VOLUME: 91.87 ML
LEFT VENTRICLE MASS INDEX: 53 G/M2
LEFT VENTRICLE SYSTOLIC VOLUME INDEX: 15.4 ML/M2
LEFT VENTRICLE SYSTOLIC VOLUME: 28.36 ML
LEFT VENTRICULAR INTERNAL DIMENSION IN DIASTOLE: 4.49 CM (ref 3.5–6)
LEFT VENTRICULAR MASS: 97.91 G
LV LATERAL E/E' RATIO: 9.73 M/S
LV SEPTAL E/E' RATIO: 10.7 M/S
MV A" WAVE DURATION": 17.7 MSEC
MV PEAK A VEL: 0.96 M/S
MV PEAK E VEL: 1.07 M/S
MV STENOSIS PRESSURE HALF TIME: 76.16 MS
MV VALVE AREA P 1/2 METHOD: 2.89 CM2
OHS CV CPX 1 MINUTE RECOVERY HEART RATE: 114 BPM
OHS CV CPX 85 PERCENT MAX PREDICTED HEART RATE MALE: 127
OHS CV CPX ESTIMATED METS: 6
OHS CV CPX MAX PREDICTED HEART RATE: 149
OHS CV CPX PATIENT IS FEMALE: 1
OHS CV CPX PATIENT IS MALE: 0
OHS CV CPX PEAK DIASTOLIC BLOOD PRESSURE: 82 MMHG
OHS CV CPX PEAK HEAR RATE: 127 BPM
OHS CV CPX PEAK RATE PRESSURE PRODUCT: NORMAL
OHS CV CPX PEAK SYSTOLIC BLOOD PRESSURE: 176 MMHG
OHS CV CPX PERCENT MAX PREDICTED HEART RATE ACHIEVED: 88
OHS CV CPX RATE PRESSURE PRODUCT PRESENTING: 8122
OHS LV EJECTION FRACTION SIMPSONS BIPLANE MOD: 59 %
PISA TR MAX VEL: 1.8 M/S
PULM VEIN S/D RATIO: 1.14
PV PEAK D VEL: 0.37 M/S
PV PEAK S VEL: 0.42 M/S
RA MAJOR: 3.53 CM
RA WIDTH: 2.23 CM
RIGHT VENTRICLE DIASTOLIC BASEL DIMENSION: 2.6 CM
SINUS: 2.67 CM
STJ: 2.87 CM
STRESS ECHO POST EXERCISE DUR MIN: 5 MINUTES
STRESS ECHO POST EXERCISE DUR SEC: 10 SECONDS
SYSTOLIC BLOOD PRESSURE: 131 MMHG
TDI LATERAL: 0.11 M/S
TDI SEPTAL: 0.1 M/S
TDI: 0.11 M/S
TR MAX PG: 13 MMHG
TRICUSPID ANNULAR PLANE SYSTOLIC EXCURSION: 1.92 CM
Z-SCORE OF LEFT VENTRICULAR DIMENSION IN END DIASTOLE: -1.28
Z-SCORE OF LEFT VENTRICULAR DIMENSION IN END SYSTOLE: -1.08

## 2024-08-14 PROCEDURE — 93017 CV STRESS TEST TRACING ONLY: CPT

## 2024-08-14 PROCEDURE — 93016 CV STRESS TEST SUPVJ ONLY: CPT | Mod: ,,, | Performed by: INTERNAL MEDICINE

## 2024-08-14 PROCEDURE — 93306 TTE W/DOPPLER COMPLETE: CPT

## 2024-08-14 PROCEDURE — 93306 TTE W/DOPPLER COMPLETE: CPT | Mod: 26,,, | Performed by: INTERNAL MEDICINE

## 2024-08-14 PROCEDURE — 76536 US EXAM OF HEAD AND NECK: CPT | Mod: TC

## 2024-08-14 PROCEDURE — 93018 CV STRESS TEST I&R ONLY: CPT | Mod: ,,, | Performed by: INTERNAL MEDICINE

## 2024-08-15 DIAGNOSIS — R59.0 LOCALIZED ENLARGED LYMPH NODES: Primary | ICD-10-CM

## 2024-08-19 ENCOUNTER — LAB VISIT (OUTPATIENT)
Dept: LAB | Facility: HOSPITAL | Age: 69
End: 2024-08-19
Payer: MEDICARE

## 2024-08-19 DIAGNOSIS — R59.0 LOCALIZED ENLARGED LYMPH NODES: ICD-10-CM

## 2024-08-19 LAB
CREAT SERPL-MCNC: 0.8 MG/DL (ref 0.5–1.4)
EST. GFR  (NO RACE VARIABLE): >60 ML/MIN/1.73 M^2

## 2024-08-19 PROCEDURE — 82565 ASSAY OF CREATININE: CPT | Performed by: NURSE PRACTITIONER

## 2024-08-19 PROCEDURE — 36415 COLL VENOUS BLD VENIPUNCTURE: CPT | Mod: PO | Performed by: NURSE PRACTITIONER

## 2024-08-20 ENCOUNTER — HOSPITAL ENCOUNTER (OUTPATIENT)
Dept: RADIOLOGY | Facility: HOSPITAL | Age: 69
Discharge: HOME | End: 2024-08-20
Attending: NURSE PRACTITIONER
Payer: COMMERCIAL

## 2024-08-20 DIAGNOSIS — R59.0 LOCALIZED ENLARGED LYMPH NODES: ICD-10-CM

## 2024-08-20 PROCEDURE — 70491 CT SOFT TISSUE NECK W/DYE: CPT | Mod: 26,,, | Performed by: RADIOLOGY

## 2024-08-20 PROCEDURE — 70491 CT SOFT TISSUE NECK W/DYE: CPT | Mod: TC

## 2024-08-20 PROCEDURE — 25500020 PHARM REV CODE 255: Performed by: NURSE PRACTITIONER

## 2024-08-20 RX ADMIN — IOHEXOL 75 ML: 350 INJECTION, SOLUTION INTRAVENOUS at 12:08

## 2024-08-21 NOTE — TELEPHONE ENCOUNTER
No care due was identified.  [facility] Embedded Care Due Messages. Reference number: [id number].   8/21/2024 3:23:26 PM CDT

## 2024-08-22 RX ORDER — TRAZODONE HYDROCHLORIDE 100 MG/1
100 TABLET ORAL NIGHTLY PRN
Qty: 90 TABLET | Refills: 1 | Status: SHIPPED | OUTPATIENT
Start: 2024-08-22

## 2024-08-22 NOTE — TELEPHONE ENCOUNTER
Refill Routing Note   Medication(s) are not appropriate for processing by [facility] for the following reason(s):        No active prescription written by provider    [facility] action(s):  Defer               Appointments  past 12m or future 3m with PCP    Date Provider   Last Visit   1/10/2024 Teresa Biswas MD   Next Visit   Visit date not found Teresa Biswas MD   ED visits in past 90 days: 0        Note composed:5:37 AM 08/22/2024

## 2024-08-23 ENCOUNTER — PATIENT MESSAGE (OUTPATIENT)
Dept: INTERNAL MEDICINE | Facility: CLINIC | Age: 69
End: 2024-08-23
Payer: MEDICARE

## 2024-08-23 PROBLEM — F32.9 MAJOR DEPRESSION, CHRONIC: Status: RESOLVED | Noted: 2019-05-09 | Resolved: 2024-08-23

## 2024-08-26 ENCOUNTER — HOSPITAL ENCOUNTER (OUTPATIENT)
Dept: CARDIOLOGY | Facility: HOSPITAL | Age: 69
Discharge: HOME | End: 2024-08-26
Attending: INTERNAL MEDICINE
Payer: COMMERCIAL

## 2024-08-26 ENCOUNTER — TELEPHONE (OUTPATIENT)
Dept: INTERNAL MEDICINE | Facility: CLINIC | Age: 69
End: 2024-08-26
Payer: MEDICARE

## 2024-08-26 DIAGNOSIS — M79.89 LEFT ARM SWELLING: ICD-10-CM

## 2024-08-26 DIAGNOSIS — L03.114 CELLULITIS OF LEFT UPPER EXTREMITY: Primary | ICD-10-CM

## 2024-08-26 DIAGNOSIS — R60.0 LOCALIZED EDEMA: ICD-10-CM

## 2024-08-26 PROCEDURE — 93971 EXTREMITY STUDY: CPT | Mod: 26,LT,, | Performed by: INTERNAL MEDICINE

## 2024-08-26 PROCEDURE — 93971 EXTREMITY STUDY: CPT | Mod: LT

## 2024-08-26 RX ORDER — SULFAMETHOXAZOLE AND TRIMETHOPRIM 800; 160 MG/1; MG/1
1 TABLET ORAL 2 TIMES DAILY
Qty: 10 TABLET | Refills: 0 | Status: SHIPPED | OUTPATIENT
Start: 2024-08-26 | End: 2024-08-31

## 2024-08-26 NOTE — TELEPHONE ENCOUNTER
Patient notified of ultrasound results, no clot.    We will treat for cellulitis with Bactrim b.i.d. for 5 days.

## 2024-09-26 DIAGNOSIS — F41.9 ANXIETY: ICD-10-CM

## 2024-09-26 RX ORDER — ALPRAZOLAM 0.25 MG/1
TABLET ORAL
Qty: 30 TABLET | Refills: 0 | Status: SHIPPED | OUTPATIENT
Start: 2024-09-26

## 2024-09-26 NOTE — TELEPHONE ENCOUNTER
No care due was identified.  [facility] Embedded Care Due Messages. Reference number: [id number].   9/26/2024 10:30:59 AM CDT

## 2024-10-17 ENCOUNTER — HOSPITAL ENCOUNTER (OUTPATIENT)
Dept: RADIOLOGY | Facility: HOSPITAL | Age: 69
Discharge: HOME | End: 2024-10-17
Attending: NURSE PRACTITIONER
Payer: COMMERCIAL

## 2024-10-17 ENCOUNTER — OFFICE VISIT (OUTPATIENT)
Dept: INTERNAL MEDICINE | Facility: CLINIC | Age: 69
End: 2024-10-17
Payer: COMMERCIAL

## 2024-10-17 VITALS
HEIGHT: 66 IN | SYSTOLIC BLOOD PRESSURE: 104 MMHG | BODY MASS INDEX: 26.08 KG/M2 | DIASTOLIC BLOOD PRESSURE: 80 MMHG | WEIGHT: 162.25 LBS | HEART RATE: 90 BPM | TEMPERATURE: 100 F | OXYGEN SATURATION: 95 %

## 2024-10-17 DIAGNOSIS — R50.9 FEVER, UNSPECIFIED FEVER CAUSE: ICD-10-CM

## 2024-10-17 DIAGNOSIS — U07.1 COVID-19: Primary | ICD-10-CM

## 2024-10-17 DIAGNOSIS — R05.1 ACUTE COUGH: ICD-10-CM

## 2024-10-17 LAB
CTP QC/QA: YES
CTP QC/QA: YES
POC MOLECULAR INFLUENZA A AGN: NEGATIVE
POC MOLECULAR INFLUENZA B AGN: NEGATIVE
SARS-COV-2 RDRP RESP QL NAA+PROBE: POSITIVE

## 2024-10-17 PROCEDURE — 71046 X-RAY EXAM CHEST 2 VIEWS: CPT | Mod: TC

## 2024-10-17 PROCEDURE — 71046 X-RAY EXAM CHEST 2 VIEWS: CPT | Mod: 26,,, | Performed by: RADIOLOGY

## 2024-10-17 PROCEDURE — 99999 PR PBB SHADOW E&M-EST. PATIENT-LVL IV: CPT | Mod: PBBFAC,,, | Performed by: NURSE PRACTITIONER

## 2024-10-17 RX ORDER — PROMETHAZINE HYDROCHLORIDE AND DEXTROMETHORPHAN HYDROBROMIDE 6.25; 15 MG/5ML; MG/5ML
5 SYRUP ORAL EVERY 8 HOURS PRN
Qty: 118 ML | Refills: 0 | Status: SHIPPED | OUTPATIENT
Start: 2024-10-17 | End: 2024-10-27

## 2024-10-17 NOTE — PROGRESS NOTES
INTERNAL MEDICINE URGENT CARE NOTE    CHIEF COMPLAINT     Chief Complaint   Patient presents with    Fever    Cough     X3 days     Headache     X3 days        HPI     Nguyen Ivan is a 69 y.o. female who presents for an urgent visit today.    Headache/ sinus congestion that started tues (10/15) evening  -t max 100.3 this AM   -cough started Wednesday morning  -no SOB; some chest pain   -denies wheezing   -chills and body aches   -tylenol and ibuprofen taken for body aches and fever   -taking dayquil and nyquil with minimal relief; feels as though her symptoms have gotten worse  - has a cough but no other symptoms     Past Medical History:  Past Medical History:   Diagnosis Date    Breast cancer 12/2017    Bir[facility] 4  Bilateral Mastectomy     Depression     Family history of breast cancer in sister     age 45,lumpectomy,XRT    H/O abnormal mammogram 10/30/2017    Birads 4    Mitral valve prolapse        Home Medications:  Prior to Admission medications    Medication Sig Start Date End Date Taking? Authorizing Provider   acetaminophen (TYLENOL) 650 MG TbSR  7/1/21   Provider, Historical   ALPRAZolam (XANAX) 0.25 MG tablet TAKE 1 TABLET(0.25 MG) BY MOUTH EVERY EVENING 9/26/24   Joelle Pereira MD   B-COMPLEX PLUS VIT C, CALCIUM, 300 mg-150 mg calcium Tab Take 1 tablet by mouth. 8/10/23   Provider, Historical   buPROPion (WELLBUTRIN XL) 150 MG TB24 tablet TAKE 1 TABLET(150 MG) BY MOUTH EVERY DAY 8/4/24   Teresa Biswas MD   cholecalciferol, vitamin D3, 125 mcg (5,000 unit) capsule TAKE 1 CAPSULE BY MOUTH EVERY DAY 11/7/23   Teresa Biswas MD   cyanocobalamin, vitamin B-12, 5,000 mcg Subl Place one under tongue once a day for 1 month, then continue to take under tongue per week 3/28/24   Suraj Obrien MD   losartan (COZAAR) 25 MG tablet Take 1 tablet (25 mg total) by mouth once daily. 1/10/24   Teresa Biswas MD   rosuvastatin (CRESTOR) 20 MG tablet Take 1 tablet (20 mg total) by mouth once daily. 1/10/24    "Teresa Biswas MD   suvorexant (BELSOMRA) 20 mg Tab Take 1 tablet by mouth every evening. 5/3/24   Suraj Obrien MD   traZODone (DESYREL) 100 MG tablet TAKE 1 TABLET(100 MG) BY MOUTH EVERY NIGHT AS NEEDED FOR INSOMNIA 8/22/24   Teresa Biswas MD   venlafaxine (EFFEXOR-XR) 75 MG 24 hr capsule Take 2 capsules (150 mg total) by mouth once daily. 1/10/24   Teresa Biswas MD       Review of Systems:  Review of Systems   Constitutional:  Positive for appetite change (decreased appetite), chills, fatigue and fever.   HENT:  Positive for congestion, drooling, rhinorrhea, sinus pressure, sinus pain and sore throat.    Eyes: Negative.    Respiratory:  Positive for cough. Negative for chest tightness, shortness of breath and wheezing.    Cardiovascular:  Positive for chest pain.   Gastrointestinal:  Negative for diarrhea, nausea and vomiting.   Endocrine: Negative.    Genitourinary: Negative.    Musculoskeletal:  Positive for arthralgias and myalgias.   Skin: Negative.    Allergic/Immunologic: Negative.    Neurological:  Positive for weakness and headaches. Negative for dizziness and light-headedness.   Hematological: Negative.    Psychiatric/Behavioral: Negative.         Health Maintainence:   Immunizations:  Health Maintenance         Date Due Completion Date    RSV Vaccine (Age 60+ and Pregnant patients) (1 - Risk 60-74 years 1-dose series) Never done ---    Influenza Vaccine (1) 09/01/2024 1/10/2024    COVID-19 Vaccine (6 - 2024-25 season) 09/01/2024 1/18/2023    Hemoglobin A1c (Prediabetes) 12/07/2024 12/7/2023    DEXA Scan 08/01/2025 8/1/2023    High Dose Statin 10/17/2025 10/17/2024    Lipid Panel 08/01/2028 8/1/2023    TETANUS VACCINE 05/09/2029 5/9/2019    Colorectal Cancer Screening 06/08/2033 6/8/2023    Override on 2/1/2011: Done             PHYSICAL EXAM     /80 (BP Location: Left arm, Patient Position: Sitting)   Pulse 90   Temp 99.8 °F (37.7 °C) (Oral)   Ht 5' 6" (1.676 m)   Wt 73.6 kg (162 lb 4.1 oz)   LMP  " (LMP Unknown) Comment:   2003  SpO2 95%   BMI 26.19 kg/m²     Physical Exam  Constitutional:       Appearance: Normal appearance. She is normal weight.   HENT:      Head: Normocephalic and atraumatic.      Right Ear: Tympanic membrane, ear canal and external ear normal.      Left Ear: Tympanic membrane, ear canal and external ear normal.      Nose: Congestion and rhinorrhea present.      Mouth/Throat:      Mouth: Mucous membranes are moist.      Pharynx: Oropharynx is clear.   Eyes:      Extraocular Movements: Extraocular movements intact.      Conjunctiva/sclera: Conjunctivae normal.      Pupils: Pupils are equal, round, and reactive to light.   Cardiovascular:      Rate and Rhythm: Normal rate and regular rhythm.      Pulses: Normal pulses.      Heart sounds: Normal heart sounds.   Pulmonary:      Effort: Pulmonary effort is normal.      Breath sounds: Examination of the right-upper field reveals rhonchi. Examination of the left-upper field reveals rhonchi. Examination of the right-middle field reveals rhonchi. Examination of the left-middle field reveals rhonchi. Examination of the right-lower field reveals rhonchi. Examination of the left-lower field reveals rhonchi. Rhonchi present.   Abdominal:      General: Abdomen is flat. Bowel sounds are normal.      Palpations: Abdomen is soft.   Musculoskeletal:         General: Normal range of motion.      Cervical back: Normal range of motion and neck supple.   Skin:     General: Skin is warm and dry.      Capillary Refill: Capillary refill takes less than 2 seconds.   Neurological:      General: No focal deficit present.      Mental Status: She is alert and oriented to person, place, and time. Mental status is at baseline.   Psychiatric:         Mood and Affect: Mood normal.         Behavior: Behavior normal.         Thought Content: Thought content normal.         Judgment: Judgment normal.         LABS     Lab Results   Component Value Date    HGBA1C 5.8 (H)  12/07/2023     CMP  Sodium   Date Value Ref Range Status   02/23/2024 142 136 - 145 mmol/L Final     Potassium   Date Value Ref Range Status   02/23/2024 3.8 3.5 - 5.1 mmol/L Final     Chloride   Date Value Ref Range Status   02/23/2024 106 95 - 110 mmol/L Final     CO2   Date Value Ref Range Status   02/23/2024 27 23 - 29 mmol/L Final     Glucose   Date Value Ref Range Status   02/23/2024 138 (H) 70 - 110 mg/dL Final     BUN   Date Value Ref Range Status   02/23/2024 13 8 - 23 mg/dL Final     Creatinine   Date Value Ref Range Status   08/19/2024 0.8 0.5 - 1.4 mg/dL Final     Calcium   Date Value Ref Range Status   02/23/2024 9.7 8.7 - 10.5 mg/dL Final     Total Protein   Date Value Ref Range Status   02/23/2024 6.8 6.0 - 8.4 g/dL Final     Albumin   Date Value Ref Range Status   02/23/2024 3.9 3.5 - 5.2 g/dL Final     Total Bilirubin   Date Value Ref Range Status   02/23/2024 0.6 0.1 - 1.0 mg/dL Final     Comment:     For infants and newborns, interpretation of results should be based  on gestational age, weight and in agreement with clinical  observations.    Premature Infant recommended reference ranges:  Up to 24 hours.............<8.0 mg/dL  Up to 48 hours............<12.0 mg/dL  3-5 days..................<15.0 mg/dL  6-29 days.................<15.0 mg/dL       Alkaline Phosphatase   Date Value Ref Range Status   02/23/2024 46 (L) 55 - 135 U/L Final     AST   Date Value Ref Range Status   02/23/2024 19 10 - 40 U/L Final     ALT   Date Value Ref Range Status   02/23/2024 23 10 - 44 U/L Final     Anion Gap   Date Value Ref Range Status   02/23/2024 9 8 - 16 mmol/L Final     eGFR if    Date Value Ref Range Status   12/16/2021 >60.0 >60 mL/min/1.73 m^2 Final     eGFR    Date Value Ref Range Status   01/21/2022 >105 >89 mL/min Final     eGFR if non    Date Value Ref Range Status   12/16/2021 >60.0 >60 mL/min/1.73 m^2 Final     Comment:     Calculation used to obtain  the estimated glomerular filtration  rate (eGFR) is the CKD-EPI equation.        Lab Results   Component Value Date    WBC 6.54 08/05/2024    HGB 14.0 08/05/2024    HCT 45.0 08/05/2024    MCV 96 08/05/2024     08/05/2024     Lab Results   Component Value Date    CHOL 163 08/01/2023    CHOL 225 (H) 11/13/2020    CHOL 200 (H) 05/14/2019     Lab Results   Component Value Date    HDL 68 08/01/2023    HDL 58 11/13/2020    HDL 55 05/14/2019     Lab Results   Component Value Date    LDLCALC 70.0 08/01/2023    LDLCALC 138.0 11/13/2020    LDLCALC 120.0 05/14/2019     Lab Results   Component Value Date    TRIG 125 08/01/2023    TRIG 145 11/13/2020    TRIG 125 05/14/2019     Lab Results   Component Value Date    CHOLHDL 41.7 08/01/2023    CHOLHDL 25.8 11/13/2020    CHOLHDL 27.5 05/14/2019     Lab Results   Component Value Date    TSH 0.626 01/26/2023    K2QXPEA 8.2 08/18/2021       ASSESSMENT/PLAN     Nguyen Ivan is a 69 y.o. female     COVID-19- new onset; start paxlovid and promethazine cough as needed   -     nirmatrelvir-ritonavir 300 mg (150 mg x 2)-100 mg copackaged tablets (EUA); Take 3 tablets by mouth 2 (two) times daily for 5 days. Each dose contains 2 nirmatrelvir (pink tablets) and 1 ritonavir (white tablet). Take all 3 tablets together  Dispense: 30 tablet; Refill: 0  -     promethazine-dextromethorphan (PROMETHAZINE-DM) 6.25-15 mg/5 mL Syrp; Take 5 mLs by mouth every 8 (eight) hours as needed (cough).  Dispense: 118 mL; Refill: 0    Acute cough- new onset; chest x-ray to r/o pneumonia  -     POCT COVID-19 Rapid Screening  -     POCT Influenza A/B Molecular  -     X-Ray Chest PA And Lateral; Future; Expected date: 10/17/2024  -     promethazine-dextromethorphan (PROMETHAZINE-DM) 6.25-15 mg/5 mL Syrp; Take 5 mLs by mouth every 8 (eight) hours as needed (cough).  Dispense: 118 mL; Refill: 0    Fever, unspecified fever cause- new onset; COVID positive  -     POCT COVID-19 Rapid Screening  -     POCT  Influenza A/B Molecular  -     X-Ray Chest PA And Lateral; Future; Expected date: 10/17/2024  -     promethazine-dextromethorphan (PROMETHAZINE-DM) 6.25-15 mg/5 mL Syrp; Take 5 mLs by mouth every 8 (eight) hours as needed (cough).  Dispense: 118 mL; Refill: 0           Follow up with PCP     Patient education provided from Tom. Patient was counseled on when and how to seek emergent care.     I hereby acknowledge that I am relying upon documentation authored by a NP student working under my supervision and further I hereby attest that I have verified the student documentation or findings by personally re-performing the physical exam and medical decision making activities of the Evaluation and Management service to be billed.  Dawn NAJERA, APRN, FNP-c   Department of Internal Medicine - [facility]  11:22 AM

## 2024-11-19 RX ORDER — VIT C/E/ZN/COPPR/LUTEIN/ZEAXAN 250MG-90MG
5000 CAPSULE ORAL
Qty: 90 CAPSULE | Refills: 3 | Status: SHIPPED | OUTPATIENT
Start: 2024-11-19

## 2024-11-19 NOTE — TELEPHONE ENCOUNTER
Please see the attached refill request.    Last ordered: 1 year ago (11/7/2023) by Teresa Biswas MD   Last refill: 10/2/2024

## 2025-01-10 DIAGNOSIS — F32.9 MAJOR DEPRESSION, CHRONIC: ICD-10-CM

## 2025-01-10 DIAGNOSIS — I10 PRIMARY HYPERTENSION: ICD-10-CM

## 2025-01-10 RX ORDER — BUPROPION HYDROCHLORIDE 150 MG/1
150 TABLET ORAL
Qty: 90 TABLET | Refills: 0 | Status: SHIPPED | OUTPATIENT
Start: 2025-01-10

## 2025-01-10 RX ORDER — LOSARTAN POTASSIUM 25 MG/1
25 TABLET ORAL
Qty: 90 TABLET | Refills: 0 | Status: SHIPPED | OUTPATIENT
Start: 2025-01-10

## 2025-01-10 NOTE — TELEPHONE ENCOUNTER
Care Due:                  Date            Visit Type   Department     Provider  --------------------------------------------------------------------------------                                MYCHART                              ANNUAL                              CHECKUP/PHY  [facility] INTERNAL  Last Visit: 01-      [facility]       Chinyere Biswas  Next Visit: None Scheduled  None         None Found                                                            Last  Test          Frequency    Reason                     Performed    Due Date  --------------------------------------------------------------------------------    Office Visit  12 months..  buPROPion,                 01-   01-                             cholecalciferol,,                             losartan, rosuvastatin,                             traZODone, venlafaxine...    CMP.........  12 months..  cholecalciferol,,          02- 02-                             losartan, rosuvastatin...    Lipid Panel.  12 months..  rosuvastatin.............  08- 07-    Vitamin D...  12 months..  cholecalciferol,.........  Not Found    Overdue    [employer] Embedded Care Due Messages. Reference number: [id number].   1/10/2025 5:25:13 AM CST

## 2025-01-10 NOTE — TELEPHONE ENCOUNTER
Provider Staff:  Action required for this patient    Requires appointment   Requires labs      Please see care gap opportunities below in Care Due Message.    Thanks!  [facility]     Appointments      Date Provider   Last Visit   1/10/2024 Teresa Biswas MD   Next Visit   Visit date not found Teresa Biswas MD     Refill Decision Note   Nguyen Ivan  is requesting a refill authorization.  Brief Assessment and Rationale for Refill:  Approve     Medication Therapy Plan:         Comments:     Note composed:10:09 AM 01/10/2025

## 2025-01-14 DIAGNOSIS — Z00.00 ENCOUNTER FOR MEDICARE ANNUAL WELLNESS EXAM: ICD-10-CM

## 2025-01-29 ENCOUNTER — OFFICE VISIT (OUTPATIENT)
Dept: CARDIOLOGY | Facility: CLINIC | Age: 70
End: 2025-01-29
Payer: COMMERCIAL

## 2025-01-29 ENCOUNTER — TELEPHONE (OUTPATIENT)
Dept: INTERNAL MEDICINE | Facility: CLINIC | Age: 70
End: 2025-01-29
Payer: MEDICARE

## 2025-01-29 VITALS
BODY MASS INDEX: 26.65 KG/M2 | SYSTOLIC BLOOD PRESSURE: 132 MMHG | HEIGHT: 66 IN | WEIGHT: 165.81 LBS | DIASTOLIC BLOOD PRESSURE: 80 MMHG | HEART RATE: 67 BPM

## 2025-01-29 DIAGNOSIS — E78.2 MIXED HYPERLIPIDEMIA: ICD-10-CM

## 2025-01-29 DIAGNOSIS — I10 PRIMARY HYPERTENSION: Primary | ICD-10-CM

## 2025-01-29 DIAGNOSIS — Z01.810 PRE-OPERATIVE CARDIOVASCULAR EXAMINATION: ICD-10-CM

## 2025-01-29 DIAGNOSIS — I70.0 AORTIC ATHEROSCLEROSIS: ICD-10-CM

## 2025-01-29 PROCEDURE — 99999 PR PBB SHADOW E&M-EST. PATIENT-LVL III: CPT | Mod: PBBFAC,,, | Performed by: INTERNAL MEDICINE

## 2025-01-29 NOTE — PROGRESS NOTES
HISTORY:    69-year-old female with a history of breast cancer status post bilateral mastectomy '17, hepatic lobectomy for benign mass '05, hypertension, aortic atherosclerosis, DDD s/p fusion '22 presenting for follow-up.      The patient denies any symptoms of chest pain. No SOB or HUYNH at this time. Underwent TST for HUYNH that was normal after last visit.     She is very active in her life without any CV limitation. She completes ADLs. She has 18 grandchildren aged 4-21 yo and at any given time maybe taking care of multiple.     Is having issues with left knee pain.     She is a retired  and .     She was told she had a h/o MVP. When she was younger she had a chronic chest pain syndrome with multiple TTE and stress tests at an OSH system that were unremarkable. She has had no issues with any of these symptoms in the last 2 decades. TTE here did not show MVP.     The patient denies any previous history of myocardial infarction, coronary artery disease, peripheral arterial disease, stroke, congestive heart failure, or cardiomyopathy. Nonsmoker and no FH of premature CAD.     She currently tolerates losartan 25x1 and rosuvastatin 20x1.     PHYSICAL EXAM:    Vitals:    01/29/25 0947   BP: 132/80   Pulse: 67         NAD, A+Ox3.  No jvd, no bruit.  RRR nml s1,s2. No murmurs.  CTA B no wheezes or crackles.  2+ B radial and 1+ B DP/PT. No edema.    LABS/STUDIES (imaging reviewed during clinic visit):    August 2024 CBC and February CMP normal.  2023 /HDL 68/LDL 70/.  A1c and TSH normal.    EKG July 2024 demonstrates sinus rhythm with no Q-waves or ST changes.  TST August 2024 5 minutes and 10 seconds on a high ramp protocol with no evidence of ischemia.    TTE August 2024 normal LV size and function with EF 60 65%.  Normal diastology.  Chest x-ray December 2021 clear with normal cardiac size.  CT Chest December 2023 aortic atherosclerosis.  Normal heart size.  No coronary  artery calcification.    MRI Brain August 2021 Unremarkable  Venous ultrasound August 2024 no evidence of left upper extremity DVT.     ASSESSMENT & PLAN:    1. Primary hypertension    2. Aortic atherosclerosis    3. Mixed hyperlipidemia    4. Pre-operative cardiovascular examination            Orders Placed This Encounter    CV Ultrasound Bilateral Doppler Carotid       Tolerating rosuvastatin 20 x 1 w LDL 70.  Pre statin LDL is 138.    Bps controlled on losartan 25 x 1.     We discussed the importance of diet modifications and instituting an exercise regimen with weight loss in mind.     SOB resolved w nml TST in Aug '24. TTE nml. No CV hx. Pt has 0 Rfs by RCRI criteria. No CV contraindications to proceeding with any anticipated knee surgeries.     Follow up if symptoms worsen or fail to improve.      Godwin Alexandre MD

## 2025-01-29 NOTE — TELEPHONE ENCOUNTER
Pt sated that she went to see Dr Livingston, cardiologist and asked if she can get off BP medication. Dr Olivarez told pt that is she wants to get off BP medications, pt would have to lose 20 pounds. Pt also stated that she asked Dr Olivarez about Ozempic and Dr Olivarez advise pt to contact PCP. Pt is requesting Ozempic or anything that Provider think is best to help lose the weight.    Please advise. Thank you

## 2025-01-29 NOTE — TELEPHONE ENCOUNTER
----- Message from Chase sent at 1/29/2025 11:38 AM CST -----  Contact: [phone]  Pt is calling pt went to see cardiology doctor and asked if she can get off pressure medication and the doctor said if you want to lose the 20 pounds then the doctor said your pressure would come down and was wondering if you can put her on ozempic.    Please call and advise

## 2025-02-03 NOTE — TELEPHONE ENCOUNTER
I spoke to the patient and told her the doctors recommendations was to stay on her b/p medications. And that her BMI was not high enough for weight loss injections, therefore the insurance company would not pay for the injections. The patient stated she was very disappointed in the doctors recommendation and wanted to know what would the doctor have to say in order for the insurance to pay for the injections.

## 2025-02-03 NOTE — TELEPHONE ENCOUNTER
I spoke to the patient and told her the doctors recommendations was to stay on her b/p medications. And that her BMI was not high enough for weight loss injections, therefore the insurance company would not

## 2025-02-04 ENCOUNTER — TELEPHONE (OUTPATIENT)
Dept: INTERNAL MEDICINE | Facility: CLINIC | Age: 70
End: 2025-02-04
Payer: MEDICARE

## 2025-02-04 DIAGNOSIS — E78.2 MIXED HYPERLIPIDEMIA: ICD-10-CM

## 2025-02-04 RX ORDER — ROSUVASTATIN CALCIUM 20 MG/1
20 TABLET, COATED ORAL
Qty: 90 TABLET | Refills: 0 | Status: SHIPPED | OUTPATIENT
Start: 2025-02-04

## 2025-02-04 NOTE — TELEPHONE ENCOUNTER
No care due was identified.  [facility] Embedded Care Due Messages. Reference number: [id number].   2/04/2025 12:35:03 PM CST

## 2025-02-04 NOTE — TELEPHONE ENCOUNTER
Refill Routing Note   Medication(s) are not appropriate for processing by [facility] for the following reason(s):        Required labs outdated    [facility] action(s):  Defer             Appointments  past 12m or future 3m with PCP    Date Provider   Last Visit   1/10/2024 Teresa Biswas MD   Next Visit   Visit date not found Teresa Biswas MD   ED visits in past 90 days: 0        Note composed:1:02 PM 02/04/2025

## 2025-02-10 ENCOUNTER — HOSPITAL ENCOUNTER (OUTPATIENT)
Dept: CARDIOLOGY | Facility: HOSPITAL | Age: 70
Discharge: HOME | End: 2025-02-10
Attending: INTERNAL MEDICINE
Payer: COMMERCIAL

## 2025-02-10 DIAGNOSIS — I70.0 AORTIC ATHEROSCLEROSIS: ICD-10-CM

## 2025-02-10 DIAGNOSIS — E78.2 MIXED HYPERLIPIDEMIA: ICD-10-CM

## 2025-02-10 DIAGNOSIS — I10 PRIMARY HYPERTENSION: ICD-10-CM

## 2025-02-10 LAB
LEFT ARM DIASTOLIC BLOOD PRESSURE: 81 MMHG
LEFT ARM SYSTOLIC BLOOD PRESSURE: 132 MMHG
LEFT CBA DIAS: 19 CM/S
LEFT CBA SYS: 56 CM/S
LEFT CCA DIST DIAS: 24 CM/S
LEFT CCA DIST SYS: 69 CM/S
LEFT CCA MID DIAS: 22 CM/S
LEFT CCA MID SYS: 74 CM/S
LEFT CCA PROX DIAS: 20 CM/S
LEFT CCA PROX SYS: 82 CM/S
LEFT ECA DIAS: 14 CM/S
LEFT ECA SYS: 63 CM/S
LEFT ICA DIST DIAS: 29 CM/S
LEFT ICA DIST SYS: 74 CM/S
LEFT ICA MID DIAS: 28 CM/S
LEFT ICA MID SYS: 67 CM/S
LEFT ICA PROX DIAS: 18 CM/S
LEFT ICA PROX SYS: 58 CM/S
LEFT VERTEBRAL DIAS: 16 CM/S
LEFT VERTEBRAL SYS: 42 CM/S
OHS CV CAROTID RIGHT ICA EDV HIGHEST: 35
OHS CV CAROTID ULTRASOUND LEFT ICA/CCA RATIO: 1.07
OHS CV CAROTID ULTRASOUND RIGHT ICA/CCA RATIO: 1.8
OHS CV PV CAROTID LEFT HIGHEST CCA: 82
OHS CV PV CAROTID LEFT HIGHEST ICA: 74
OHS CV PV CAROTID RIGHT HIGHEST CCA: 78
OHS CV PV CAROTID RIGHT HIGHEST ICA: 92
OHS CV US CAROTID LEFT HIGHEST EDV: 29
RIGHT ARM DIASTOLIC BLOOD PRESSURE: 79 MMHG
RIGHT ARM SYSTOLIC BLOOD PRESSURE: 137 MMHG
RIGHT CBA DIAS: 23 CM/S
RIGHT CBA SYS: 61 CM/S
RIGHT CCA DIST DIAS: 17 CM/S
RIGHT CCA DIST SYS: 51 CM/S
RIGHT CCA MID DIAS: 22 CM/S
RIGHT CCA MID SYS: 67 CM/S
RIGHT CCA PROX DIAS: 22 CM/S
RIGHT CCA PROX SYS: 78 CM/S
RIGHT ECA DIAS: 11 CM/S
RIGHT ECA SYS: 55 CM/S
RIGHT ICA DIST DIAS: 35 CM/S
RIGHT ICA DIST SYS: 92 CM/S
RIGHT ICA MID DIAS: 26 CM/S
RIGHT ICA MID SYS: 70 CM/S
RIGHT ICA PROX DIAS: 24 CM/S
RIGHT ICA PROX SYS: 66 CM/S
RIGHT VERTEBRAL DIAS: 10 CM/S
RIGHT VERTEBRAL SYS: 38 CM/S

## 2025-02-10 PROCEDURE — 93880 EXTRACRANIAL BILAT STUDY: CPT

## 2025-02-10 PROCEDURE — 93880 EXTRACRANIAL BILAT STUDY: CPT | Mod: 26,,, | Performed by: INTERNAL MEDICINE

## 2025-02-19 DIAGNOSIS — F41.9 ANXIETY: ICD-10-CM

## 2025-02-19 RX ORDER — ALPRAZOLAM 0.25 MG/1
TABLET ORAL
Qty: 30 TABLET | OUTPATIENT
Start: 2025-02-19

## 2025-02-19 RX ORDER — TRAZODONE HYDROCHLORIDE 100 MG/1
100 TABLET ORAL NIGHTLY PRN
Qty: 90 TABLET | Refills: 0 | Status: SHIPPED | OUTPATIENT
Start: 2025-02-19

## 2025-02-19 NOTE — TELEPHONE ENCOUNTER
No care due was identified.  [facility] Embedded Care Due Messages. Reference number: [id number].   2/19/2025 12:25:24 PM CST

## 2025-02-19 NOTE — TELEPHONE ENCOUNTER
Refill Decision Note   Nguyen Ivan  is requesting a refill authorization.  Brief Assessment and Rationale for Refill:  Approve     Medication Therapy Plan:        Comments:     Note composed:12:49 PM 02/19/2025

## 2025-03-06 ENCOUNTER — OFFICE VISIT (OUTPATIENT)
Dept: INTERNAL MEDICINE | Facility: CLINIC | Age: 70
End: 2025-03-06
Payer: COMMERCIAL

## 2025-03-06 VITALS
DIASTOLIC BLOOD PRESSURE: 76 MMHG | BODY MASS INDEX: 25.9 KG/M2 | WEIGHT: 161.19 LBS | HEART RATE: 74 BPM | HEIGHT: 66 IN | SYSTOLIC BLOOD PRESSURE: 116 MMHG | OXYGEN SATURATION: 95 %

## 2025-03-06 DIAGNOSIS — Z85.3 HISTORY OF BREAST CANCER: ICD-10-CM

## 2025-03-06 DIAGNOSIS — M85.89 OSTEOPENIA OF MULTIPLE SITES: ICD-10-CM

## 2025-03-06 DIAGNOSIS — F32.9 MAJOR DEPRESSION, CHRONIC: ICD-10-CM

## 2025-03-06 DIAGNOSIS — R39.15 URGENCY OF URINATION: ICD-10-CM

## 2025-03-06 DIAGNOSIS — Z90.13 HISTORY OF BILATERAL MASTECTOMY: ICD-10-CM

## 2025-03-06 DIAGNOSIS — I10 PRIMARY HYPERTENSION: ICD-10-CM

## 2025-03-06 DIAGNOSIS — F41.9 ANXIETY: ICD-10-CM

## 2025-03-06 DIAGNOSIS — M17.12 OSTEOARTHRITIS OF LEFT KNEE, UNSPECIFIED OSTEOARTHRITIS TYPE: ICD-10-CM

## 2025-03-06 DIAGNOSIS — E78.2 MIXED HYPERLIPIDEMIA: ICD-10-CM

## 2025-03-06 DIAGNOSIS — R73.03 PREDIABETES: ICD-10-CM

## 2025-03-06 DIAGNOSIS — I70.0 AORTIC ATHEROSCLEROSIS: Primary | ICD-10-CM

## 2025-03-06 PROCEDURE — 3044F HG A1C LEVEL LT 7.0%: CPT | Mod: CPTII,S$GLB,, | Performed by: NURSE PRACTITIONER

## 2025-03-06 PROCEDURE — 3074F SYST BP LT 130 MM HG: CPT | Mod: CPTII,S$GLB,, | Performed by: NURSE PRACTITIONER

## 2025-03-06 PROCEDURE — 99214 OFFICE O/P EST MOD 30 MIN: CPT | Mod: S$GLB,,, | Performed by: NURSE PRACTITIONER

## 2025-03-06 PROCEDURE — 1159F MED LIST DOCD IN RCRD: CPT | Mod: CPTII,S$GLB,, | Performed by: NURSE PRACTITIONER

## 2025-03-06 PROCEDURE — 3288F FALL RISK ASSESSMENT DOCD: CPT | Mod: CPTII,S$GLB,, | Performed by: NURSE PRACTITIONER

## 2025-03-06 PROCEDURE — 3078F DIAST BP <80 MM HG: CPT | Mod: CPTII,S$GLB,, | Performed by: NURSE PRACTITIONER

## 2025-03-06 PROCEDURE — 3008F BODY MASS INDEX DOCD: CPT | Mod: CPTII,S$GLB,, | Performed by: NURSE PRACTITIONER

## 2025-03-06 PROCEDURE — 99999 PR PBB SHADOW E&M-EST. PATIENT-LVL IV: CPT | Mod: PBBFAC,,, | Performed by: NURSE PRACTITIONER

## 2025-03-06 PROCEDURE — 1101F PT FALLS ASSESS-DOCD LE1/YR: CPT | Mod: CPTII,S$GLB,, | Performed by: NURSE PRACTITIONER

## 2025-03-06 PROCEDURE — 1125F AMNT PAIN NOTED PAIN PRSNT: CPT | Mod: CPTII,S$GLB,, | Performed by: NURSE PRACTITIONER

## 2025-03-06 PROCEDURE — 4010F ACE/ARB THERAPY RXD/TAKEN: CPT | Mod: CPTII,S$GLB,, | Performed by: NURSE PRACTITIONER

## 2025-03-06 RX ORDER — TRAZODONE HYDROCHLORIDE 100 MG/1
100 TABLET ORAL NIGHTLY PRN
Qty: 90 TABLET | Refills: 1 | Status: SHIPPED | OUTPATIENT
Start: 2025-03-06

## 2025-03-06 RX ORDER — ALPRAZOLAM 0.25 MG/1
0.25 TABLET ORAL NIGHTLY PRN
Qty: 30 TABLET | Refills: 0 | Status: SHIPPED | OUTPATIENT
Start: 2025-03-06

## 2025-03-06 RX ORDER — BUPROPION HYDROCHLORIDE 75 MG/1
75 TABLET ORAL 2 TIMES DAILY
Qty: 60 TABLET | Refills: 0 | Status: SHIPPED | OUTPATIENT
Start: 2025-03-06 | End: 2026-03-06

## 2025-03-06 RX ORDER — VENLAFAXINE HYDROCHLORIDE 75 MG/1
150 CAPSULE, EXTENDED RELEASE ORAL DAILY
Qty: 180 CAPSULE | Refills: 3 | Status: SHIPPED | OUTPATIENT
Start: 2025-03-06

## 2025-03-06 NOTE — PROGRESS NOTES
"Internal Medicine Annual Exam       CHIEF COMPLAINT     The patient, Nguyen Ivan, who is a 70 y.o. female  with HTN, HLD, preDM, Breast CA s/p B mastectomy 11/2017 and s/p breast reconstruction, fam hx breast CA, MVP (seen by Cards in the past at  and told it was no longer an issue, last echo many years ago), depression, hx liver lobectomy 2/2 "a growth on her liver" (path benign per pt and reports f/u imaging had been normal), baseline hearing impaired, chronic neck and back pain c hx herniated discs followed by Dr. Fairchild in the past but now seeing Dr. Hyman,  presents for an annual exam.    HPI   Here for annual - last seen   To review, has been followed by Dr. Alberta Hyman for chronic low back pain.  Had some recent issues c recurrent neck and back pain in Nov and was rx'ed Flexeril and did PT but stopped PT for now as she is awaiting and MRI from her pain mgmt dr.     Hx Breast CA followed by Dr. Betty Meyer - well controlled. No scans     MRI abd pelvis   1.   There is no primary malignancy in the abdomen or pelvis. There is no evidence of metastatic disease.   2.   There are one or two tiny cysts in the pancreas is detailed above of doubtful clinical significance, likely tiny sidebranch IPMNs.   CT chest -   IMPRESSION:   Stable exam. Postsurgical changes. There is no evidence of metastatic disease. There is no acute abnormality.   Will repeat in 1 year     HOMA- Has been on Effexor and Wellbutrin for mood c prn Xanax which she rarely takes.  Also takes Trazodone for sleep.  States Trazodone alone does not always work but takes it c a THC gummy which works well.  Depression- had bought of depression 7/2023 - started wellbutrin. Would like to try weaning off     Insomnia- stopped belsomra; resumed trazodone      Mild neurocog decline- seen by neuropscyh- 3/28/2024       Right arm numbness 1 month ago lasting 2 hours- seen by cardiology for this 7/2024   Stress echo -    The ECG portion of the " study is negative for ischemia.    The patient exercised for 5 minutes 10 seconds on a high ramp protocol, corresponding to a functional capacity of 6METS, achieving a peak heart rate of 127 bpm, which is 88% of the age predicted maximum heart rate.  Echo portion stable     Aortic atherosclerosis/HLD- taking crestor and losartan - seen by cards 1/29/2025  Right Carotid The highest right ICA velocity divided by the right distal CCA velocity is 1.80 .   Left Carotid The highest left ICA velocity divided by the left distal CCA velocity is 1.07.        Pre-Dm- would like to lose weight and concerned about getting DM2 - asking about use of GLP-1s or another medication to lose weight   Lab Results   Component Value Date    HGBA1C 5.8 (H) 03/07/2025          Past Medical History:  Past Medical History:   Diagnosis Date    Breast cancer 12/2017    Birads 4  Bilateral Mastectomy     Depression     Family history of breast cancer in sister     age 45,lumpectomy,XRT    H/O abnormal mammogram 10/30/2017    Birads 4    Mitral valve prolapse        Past Surgical History:   Procedure Laterality Date    BILATERAL MASTECTOMY Bilateral 12/2017    BREAST LUMPECTOMY  2017    atypical ductal hyperplasia     BREAST RECONSTRUCTION Bilateral 3/2018 & 5/2018    CHOLECYSTECTOMY  2005    COLONOSCOPY  2011    COLONOSCOPY N/A 6/8/2023    Procedure: COLONOSCOPY;  Surgeon: Ozzy Dela Cruz MD;  Location: [facility] ENDO ([address] [place] [address]);  Service: Colon and Rectal;  Laterality: N/A;  4/27 referred by Dawn Black NP/PEG/instr. to portal-st    DILATION AND CURETTAGE OF UTERUS  1983    Missed Ab    EYE SURGERY  2017    cataracts    HERNIA REPAIR  2005    LIVER LOBECTOMY  2005    REDUCTION OF BOTH BREASTS  2009    TONSILLECTOMY  08/1970    TUBAL LIGATION  1985    with last delivery     UMBILICAL HERNIA REPAIR  2006        Family History   Problem Relation Name Age of Onset    Heart attack Father Mauro Carbone     Alcohol abuse Father Mauro Carbone              Depression Father Mauro Carbone     Dementia Mother Rosana Carbone     Alzheimer's disease Mother Rosana Carbone     Arthritis Mother Rosana Carbone             Cancer Mother Rosana Carbone     Hearing loss Mother Rosana Carbone     Miscarriages / Stillbirths Mother Rosana Carbone     Alcohol abuse Brother Carlitos Carbone             Kidney disease Brother Carlitos Carbone     Alcohol abuse Brother Dirk Carbone     Alcohol abuse Brother Jayme Carbone     Alcohol abuse Brother Mauro Carbone Mathew             Drug abuse Brother Mauro Carbone Mathew     Kidney disease Brother Mauro Carbone Mathew     Breast cancer Sister Pippa Jose 45        XRT    Cancer Sister Pippa Jose     Alcohol abuse Sister Josie Reyna     Miscarriages / Stillbirths Sister Amanda Urbina     Dementia Sister Amanda Urbina     Breast cancer Maternal Aunt      Breast cancer Paternal Aunt      Colon cancer Neg Hx      Ovarian cancer Neg Hx      Uterine cancer Neg Hx      Cervical cancer Neg Hx      Prostate cancer Neg Hx          Social History[1]     Tobacco Use History[2]     Allergies as of 2025    (No Known Allergies)          Home Medications:  Prior to Admission medications    Medication Sig Start Date End Date Taking? Authorizing Provider   acetaminophen (TYLENOL) 650 MG TbSR  21   Provider, Historical   ALPRAZolam (XANAX) 0.25 MG tablet TAKE 1 TABLET(0.25 MG) BY MOUTH EVERY EVENING 24   Joelle Pereira MD   B-COMPLEX PLUS VIT C, CALCIUM, 300 mg-150 mg calcium Tab Take 1 tablet by mouth Daily. 8/10/23   Provider, Historical   buPROPion (WELLBUTRIN XL) 150 MG TB24 tablet TAKE 1 TABLET(150 MG) BY MOUTH EVERY DAY 1/10/25   Teresa Biswas MD   cholecalciferol, vitamin D3, 125 mcg (5,000 unit) capsule TAKE 1 CAPSULE BY MOUTH EVERY DAY 24   Teresa Biswas MD   losartan (COZAAR) 25 MG tablet TAKE 1 TABLET(25 MG) BY  MOUTH EVERY DAY 1/10/25   Teresa Biswas MD   rosuvastatin (CRESTOR) 20 MG tablet TAKE 1 TABLET(20 MG) BY MOUTH EVERY DAY 2/4/25   Teresa Biswas MD   traZODone (DESYREL) 100 MG tablet TAKE 1 TABLET(100 MG) BY MOUTH EVERY NIGHT AS NEEDED FOR INSOMNIA 2/19/25   Teresa Biswas MD   venlafaxine (EFFEXOR-XR) 75 MG 24 hr capsule Take 2 capsules (150 mg total) by mouth once daily. 1/10/24   Teresa Biswas MD   cyanocobalamin, vitamin B-12, 5,000 mcg Subl Place one under tongue once a day for 1 month, then continue to take under tongue per week 3/28/24 3/6/25  Suraj Obrien MD   suvorexant (BELSOMRA) 20 mg Tab Take 1 tablet by mouth every evening. 5/3/24 3/6/25  Suraj Obrien MD       Review of Systems:  Review of Systems   Constitutional:  Negative for chills, fatigue, fever and unexpected weight change.   HENT:  Negative for congestion, hearing loss, rhinorrhea and sinus pressure.    Eyes:  Negative for pain, redness and visual disturbance.   Respiratory:  Negative for cough and shortness of breath.    Cardiovascular:  Negative for chest pain and palpitations.   Gastrointestinal:  Negative for abdominal distention, abdominal pain, constipation, diarrhea, nausea and vomiting.   Endocrine: Negative for polydipsia, polyphagia and polyuria.   Genitourinary:  Negative for dysuria, frequency, urgency and vaginal discharge.   Musculoskeletal:  Positive for arthralgias, back pain and neck pain. Negative for gait problem and myalgias.   Skin:  Negative for color change and rash.   Allergic/Immunologic: Negative for environmental allergies and immunocompromised state.   Neurological:  Positive for numbness. Negative for dizziness, weakness, light-headedness and headaches.   Hematological:  Negative for adenopathy. Does not bruise/bleed easily.   Psychiatric/Behavioral:  Negative for confusion and sleep disturbance. The patient is not nervous/anxious.        Health Maintainence:   Immunizations:  Health Maintenance         Date Due  "Completion Date    RSV Vaccine (Age 60+ and Pregnant patients) (1 - Risk 60-74 years 1-dose series) Never done ---    Influenza Vaccine (1) 09/01/2024 1/10/2024    COVID-19 Vaccine (6 - 2024-25 season) 09/01/2024 1/18/2023    DEXA Scan 08/01/2025 8/1/2023    High Dose Statin 03/06/2026 3/6/2025    Hemoglobin A1c (Prediabetes) 03/07/2026 3/7/2025    TETANUS VACCINE 05/09/2029 5/9/2019    Lipid Panel 03/07/2030 3/7/2025    Colorectal Cancer Screening 06/08/2033 6/8/2023    Override on 2/1/2011: Done             PHYSICAL EXAM     /76 (BP Location: Left arm, Patient Position: Sitting)   Pulse 74   Ht 5' 6" (1.676 m)   Wt 73.1 kg (161 lb 2.5 oz)   LMP  (LMP Unknown) Comment:   2003  SpO2 95%   BMI 26.01 kg/m²  Body mass index is 26.01 kg/m².    Physical Exam  Vitals reviewed.   Constitutional:       Appearance: She is well-developed.   HENT:      Head: Normocephalic.      Right Ear: External ear normal.      Left Ear: External ear normal.      Nose: Nose normal.      Mouth/Throat:      Pharynx: No oropharyngeal exudate.   Eyes:      Pupils: Pupils are equal, round, and reactive to light.   Neck:      Thyroid: No thyromegaly.      Vascular: No JVD.      Trachea: No tracheal deviation.   Cardiovascular:      Rate and Rhythm: Normal rate and regular rhythm.      Heart sounds: Normal heart sounds. No murmur heard.     No friction rub. No gallop.   Pulmonary:      Effort: Pulmonary effort is normal. No respiratory distress.      Breath sounds: Normal breath sounds. No wheezing or rales.   Abdominal:      General: Bowel sounds are normal. There is no distension.      Palpations: Abdomen is soft.      Tenderness: There is no abdominal tenderness.   Musculoskeletal:         General: No tenderness. Normal range of motion.      Cervical back: Neck supple.   Lymphadenopathy:      Cervical: No cervical adenopathy.   Skin:     General: Skin is warm and dry.      Findings: No rash.   Neurological:      Mental Status: " She is alert and oriented to person, place, and time.   Psychiatric:         Behavior: Behavior normal.         LABS     Lab Results   Component Value Date    HGBA1C 5.8 (H) 03/07/2025     CMP  Sodium   Date Value Ref Range Status   03/07/2025 140 136 - 145 mmol/L Final     Potassium   Date Value Ref Range Status   03/07/2025 4.3 3.5 - 5.1 mmol/L Final     Chloride   Date Value Ref Range Status   03/07/2025 108 95 - 110 mmol/L Final     CO2   Date Value Ref Range Status   03/07/2025 22 (L) 23 - 29 mmol/L Final     Glucose   Date Value Ref Range Status   03/07/2025 92 70 - 110 mg/dL Final     BUN   Date Value Ref Range Status   03/07/2025 14 8 - 23 mg/dL Final     Creatinine   Date Value Ref Range Status   03/07/2025 0.8 0.5 - 1.4 mg/dL Final     Calcium   Date Value Ref Range Status   03/07/2025 9.7 8.7 - 10.5 mg/dL Final     Total Protein   Date Value Ref Range Status   03/07/2025 7.0 6.0 - 8.4 g/dL Final     Albumin   Date Value Ref Range Status   03/07/2025 4.1 3.5 - 5.2 g/dL Final     Total Bilirubin   Date Value Ref Range Status   03/07/2025 0.8 0.1 - 1.0 mg/dL Final     Comment:     For infants and newborns, interpretation of results should be based  on gestational age, weight and in agreement with clinical  observations.    Premature Infant recommended reference ranges:  Up to 24 hours.............<8.0 mg/dL  Up to 48 hours............<12.0 mg/dL  3-5 days..................<15.0 mg/dL  6-29 days.................<15.0 mg/dL       Alkaline Phosphatase   Date Value Ref Range Status   03/07/2025 41 40 - 150 U/L Final     AST   Date Value Ref Range Status   03/07/2025 64 (H) 10 - 40 U/L Final     ALT   Date Value Ref Range Status   03/07/2025 26 10 - 44 U/L Final     Anion Gap   Date Value Ref Range Status   03/07/2025 10 8 - 16 mmol/L Final     eGFR if    Date Value Ref Range Status   12/16/2021 >60.0 >60 mL/min/1.73 m^2 Final     eGFR if non    Date Value Ref Range Status    12/16/2021 >60.0 >60 mL/min/1.73 m^2 Final     Comment:     Calculation used to obtain the estimated glomerular filtration  rate (eGFR) is the CKD-EPI equation.        Lab Results   Component Value Date    WBC 6.38 03/07/2025    HGB 14.7 03/07/2025    HCT 48.0 03/07/2025    MCV 95 03/07/2025     03/07/2025     Lab Results   Component Value Date    CHOL 149 03/07/2025    CHOL 163 08/01/2023    CHOL 225 (H) 11/13/2020     Lab Results   Component Value Date    HDL 70 03/07/2025    HDL 68 08/01/2023    HDL 58 11/13/2020     Lab Results   Component Value Date    LDLCALC 65.8 03/07/2025    LDLCALC 70.0 08/01/2023    LDLCALC 138.0 11/13/2020     Lab Results   Component Value Date    TRIG 66 03/07/2025    TRIG 125 08/01/2023    TRIG 145 11/13/2020     Lab Results   Component Value Date    CHOLHDL 47.0 03/07/2025    CHOLHDL 41.7 08/01/2023    CHOLHDL 25.8 11/13/2020     Lab Results   Component Value Date    TSH 0.222 (L) 03/07/2025    [id number] 8.2 08/18/2021       ASSESSMENT/PLAN     Nguyen Ivan is a 70 y.o. female    Aortic atherosclerosis- stable. Will cont crestor and losartan, f/u with cardiology and monitor FLP   -     CBC Auto Differential; Future; Expected date: 03/06/2025  -     Comprehensive Metabolic Panel; Future; Expected date: 03/06/2025  -     Hemoglobin A1C; Future; Expected date: 03/06/2025  -     Lipid Panel; Future; Expected date: 03/06/2025  -     TSH; Future; Expected date: 03/06/2025    Mixed hyperlipidemia- stable. Will repeat flp and cont crestor   -     CBC Auto Differential; Future; Expected date: 03/06/2025  -     Comprehensive Metabolic Panel; Future; Expected date: 03/06/2025  -     Hemoglobin A1C; Future; Expected date: 03/06/2025  -     Lipid Panel; Future; Expected date: 03/06/2025  -     TSH; Future; Expected date: 03/06/2025    Primary hypertension- at goal, will cont losartan   -     CBC Auto Differential; Future; Expected date: 03/06/2025  -     Comprehensive Metabolic  Panel; Future; Expected date: 03/06/2025  -     Hemoglobin A1C; Future; Expected date: 03/06/2025  -     Lipid Panel; Future; Expected date: 03/06/2025  -     TSH; Future; Expected date: 03/06/2025    Urgency of urination- check labs, will start lifestyle changes   -     CBC Auto Differential; Future; Expected date: 03/06/2025  -     Comprehensive Metabolic Panel; Future; Expected date: 03/06/2025  -     Hemoglobin A1C; Future; Expected date: 03/06/2025  -     Lipid Panel; Future; Expected date: 03/06/2025  -     TSH; Future; Expected date: 03/06/2025    History of bilateral mastectomy- f/u with oncology at [facility] Auto Differential; Future; Expected date: 03/06/2025  -     Comprehensive Metabolic Panel; Future; Expected date: 03/06/2025  -     Hemoglobin A1C; Future; Expected date: 03/06/2025  -     Lipid Panel; Future; Expected date: 03/06/2025  -     TSH; Future; Expected date: 03/06/2025    History of breast cancer- f/u with oncology at [facility] Auto Differential; Future; Expected date: 03/06/2025  -     Comprehensive Metabolic Panel; Future; Expected date: 03/06/2025  -     Hemoglobin A1C; Future; Expected date: 03/06/2025  -     Lipid Panel; Future; Expected date: 03/06/2025  -     TSH; Future; Expected date: 03/06/2025    Prediabetes- stable. Will monitor A1c   -     CBC Auto Differential; Future; Expected date: 03/06/2025  -     Comprehensive Metabolic Panel; Future; Expected date: 03/06/2025  -     Hemoglobin A1C; Future; Expected date: 03/06/2025  -     Lipid Panel; Future; Expected date: 03/06/2025  -     TSH; Future; Expected date: 03/06/2025    Osteopenia of multiple sites- stable. Will repeat dexa and cont calcium and vit D   -     CBC Auto Differential; Future; Expected date: 03/06/2025  -     Comprehensive Metabolic Panel; Future; Expected date: 03/06/2025  -     Hemoglobin A1C; Future; Expected date: 03/06/2025  -     Lipid Panel; Future; Expected date: 03/06/2025  -     TSH; Future;  Expected date: 03/06/2025  -     DXA Bone Density Axial Skeleton 1 or more sites; Future; Expected date: 03/06/2025    Major depression, chronic- stable. Will cont effexor and wean off wellbutrin   -     traZODone (DESYREL) 100 MG tablet; Take 1 tablet (100 mg total) by mouth nightly as needed for Insomnia.  Dispense: 90 tablet; Refill: 1  -     venlafaxine (EFFEXOR-XR) 75 MG 24 hr capsule; Take 2 capsules (150 mg total) by mouth once daily.  Dispense: 180 capsule; Refill: 3  -     buPROPion (WELLBUTRIN) 75 MG tablet; Take 1 tablet (75 mg total) by mouth 2 (two) times daily.  Dispense: 60 tablet; Refill: 0  -     CBC Auto Differential; Future; Expected date: 03/06/2025  -     Comprehensive Metabolic Panel; Future; Expected date: 03/06/2025  -     Hemoglobin A1C; Future; Expected date: 03/06/2025  -     Lipid Panel; Future; Expected date: 03/06/2025  -     TSH; Future; Expected date: 03/06/2025    Anxiety stable. Will cont effexor and wean off wellbutrin   -     traZODone (DESYREL) 100 MG tablet; Take 1 tablet (100 mg total) by mouth nightly as needed for Insomnia.  Dispense: 90 tablet; Refill: 1  -     ALPRAZolam (XANAX) 0.25 MG tablet; Take 1 tablet (0.25 mg total) by mouth nightly as needed for Anxiety.  Dispense: 30 tablet; Refill: 0  -     CBC Auto Differential; Future; Expected date: 03/06/2025  -     Comprehensive Metabolic Panel; Future; Expected date: 03/06/2025  -     Hemoglobin A1C; Future; Expected date: 03/06/2025  -     Lipid Panel; Future; Expected date: 03/06/2025  -     TSH; Future; Expected date: 03/06/2025    Osteoarthritis of left knee, unspecified osteoarthritis type  -     KNEE BRACE FOR HOME USE      Follow up with PCP    Dawn NAJERA, APRN, FNP-c   Department of Internal Medicine - [facility]  1:53 PM         [1]   Social History  Socioeconomic History    Marital status:    Tobacco Use    Smoking status: Never    Smokeless tobacco: Never   Substance and Sexual  Activity    Alcohol use: Yes     Alcohol/week: 0.0 - 4.0 standard drinks of alcohol     Comment: Social     Drug use: Yes     Types: Marijuana     Comment: [facility] gummies/medical marijuana    Sexual activity: Yes     Partners: Male     Birth control/protection: Post-menopausal, See Surgical Hx     Comment: tubal ligation 198     Social Drivers of Health     Food Insecurity: No Food Insecurity (2/20/2024)    Hunger Vital Sign     Worried About Running Out of Food in the Last Year: Never true     Ran Out of Food in the Last Year: Never true   Transportation Needs: No Transportation Needs (2/20/2024)    PRAPARE - Transportation     Lack of Transportation (Medical): No     Lack of Transportation (Non-Medical): No   Physical Activity: Inactive (2/20/2024)    Exercise Vital Sign     Days of Exercise per Week: 0 days     Minutes of Exercise per Session: 30 min   Stress: Stress Concern Present (2/20/2024)    [employer] - Occupational Stress Questionnaire     Feeling of Stress : To some extent   Housing Stability: Low Risk  (2/20/2024)    Housing Stability Vital Sign     Unable to Pay for Housing in the Last Year: No     Number of Places Lived in the Last Year: 1     Unstable Housing in the Last Year: No   [2]   Social History  Tobacco Use   Smoking Status Never   Smokeless Tobacco Never

## 2025-03-07 ENCOUNTER — LAB VISIT (OUTPATIENT)
Dept: LAB | Facility: HOSPITAL | Age: 70
End: 2025-03-07
Payer: MEDICARE

## 2025-03-07 DIAGNOSIS — F41.9 ANXIETY: ICD-10-CM

## 2025-03-07 DIAGNOSIS — R39.15 URGENCY OF URINATION: ICD-10-CM

## 2025-03-07 DIAGNOSIS — I70.0 AORTIC ATHEROSCLEROSIS: ICD-10-CM

## 2025-03-07 DIAGNOSIS — R73.03 PREDIABETES: ICD-10-CM

## 2025-03-07 DIAGNOSIS — F32.9 MAJOR DEPRESSION, CHRONIC: ICD-10-CM

## 2025-03-07 DIAGNOSIS — Z85.3 HISTORY OF BREAST CANCER: ICD-10-CM

## 2025-03-07 DIAGNOSIS — M85.89 OSTEOPENIA OF MULTIPLE SITES: ICD-10-CM

## 2025-03-07 DIAGNOSIS — Z90.13 HISTORY OF BILATERAL MASTECTOMY: ICD-10-CM

## 2025-03-07 DIAGNOSIS — E78.2 MIXED HYPERLIPIDEMIA: ICD-10-CM

## 2025-03-07 DIAGNOSIS — I10 PRIMARY HYPERTENSION: ICD-10-CM

## 2025-03-07 LAB
ALBUMIN SERPL BCP-MCNC: 4.1 G/DL (ref 3.5–5.2)
ALP SERPL-CCNC: 41 U/L (ref 40–150)
ALT SERPL W/O P-5'-P-CCNC: 26 U/L (ref 10–44)
ANION GAP SERPL CALC-SCNC: 10 MMOL/L (ref 8–16)
AST SERPL-CCNC: 64 U/L (ref 10–40)
BASOPHILS # BLD AUTO: 0.06 K/UL (ref 0–0.2)
BASOPHILS NFR BLD: 0.9 % (ref 0–1.9)
BILIRUB SERPL-MCNC: 0.8 MG/DL (ref 0.1–1)
BUN SERPL-MCNC: 14 MG/DL (ref 8–23)
CALCIUM SERPL-MCNC: 9.7 MG/DL (ref 8.7–10.5)
CHLORIDE SERPL-SCNC: 108 MMOL/L (ref 95–110)
CHOLEST SERPL-MCNC: 149 MG/DL (ref 120–199)
CHOLEST/HDLC SERPL: 2.1 {RATIO} (ref 2–5)
CO2 SERPL-SCNC: 22 MMOL/L (ref 23–29)
CREAT SERPL-MCNC: 0.8 MG/DL (ref 0.5–1.4)
DIFFERENTIAL METHOD BLD: ABNORMAL
EOSINOPHIL # BLD AUTO: 0.1 K/UL (ref 0–0.5)
EOSINOPHIL NFR BLD: 1.6 % (ref 0–8)
ERYTHROCYTE [DISTWIDTH] IN BLOOD BY AUTOMATED COUNT: 12.9 % (ref 11.5–14.5)
EST. GFR  (NO RACE VARIABLE): >60 ML/MIN/1.73 M^2
ESTIMATED AVG GLUCOSE: 120 MG/DL (ref 68–131)
GLUCOSE SERPL-MCNC: 92 MG/DL (ref 70–110)
HBA1C MFR BLD: 5.8 % (ref 4–5.6)
HCT VFR BLD AUTO: 48 % (ref 37–48.5)
HDLC SERPL-MCNC: 70 MG/DL (ref 40–75)
HDLC SERPL: 47 % (ref 20–50)
HGB BLD-MCNC: 14.7 G/DL (ref 12–16)
IMM GRANULOCYTES # BLD AUTO: 0.01 K/UL (ref 0–0.04)
IMM GRANULOCYTES NFR BLD AUTO: 0.2 % (ref 0–0.5)
LDLC SERPL CALC-MCNC: 65.8 MG/DL (ref 63–159)
LYMPHOCYTES # BLD AUTO: 1.9 K/UL (ref 1–4.8)
LYMPHOCYTES NFR BLD: 29 % (ref 18–48)
MCH RBC QN AUTO: 29 PG (ref 27–31)
MCHC RBC AUTO-ENTMCNC: 30.6 G/DL (ref 32–36)
MCV RBC AUTO: 95 FL (ref 82–98)
MONOCYTES # BLD AUTO: 0.5 K/UL (ref 0.3–1)
MONOCYTES NFR BLD: 8 % (ref 4–15)
NEUTROPHILS # BLD AUTO: 3.9 K/UL (ref 1.8–7.7)
NEUTROPHILS NFR BLD: 60.3 % (ref 38–73)
NONHDLC SERPL-MCNC: 79 MG/DL
NRBC BLD-RTO: 0 /100 WBC
PLATELET # BLD AUTO: 257 K/UL (ref 150–450)
PMV BLD AUTO: 9.8 FL (ref 9.2–12.9)
POTASSIUM SERPL-SCNC: 4.3 MMOL/L (ref 3.5–5.1)
PROT SERPL-MCNC: 7 G/DL (ref 6–8.4)
RBC # BLD AUTO: 5.07 M/UL (ref 4–5.4)
SODIUM SERPL-SCNC: 140 MMOL/L (ref 136–145)
T4 FREE SERPL-MCNC: 1.1 NG/DL (ref 0.71–1.51)
TRIGL SERPL-MCNC: 66 MG/DL (ref 30–150)
TSH SERPL DL<=0.005 MIU/L-ACNC: 0.22 UIU/ML (ref 0.4–4)
WBC # BLD AUTO: 6.38 K/UL (ref 3.9–12.7)

## 2025-03-07 PROCEDURE — 85025 COMPLETE CBC W/AUTO DIFF WBC: CPT | Performed by: NURSE PRACTITIONER

## 2025-03-07 PROCEDURE — 84443 ASSAY THYROID STIM HORMONE: CPT | Performed by: NURSE PRACTITIONER

## 2025-03-07 PROCEDURE — 80061 LIPID PANEL: CPT | Performed by: NURSE PRACTITIONER

## 2025-03-07 PROCEDURE — 83036 HEMOGLOBIN GLYCOSYLATED A1C: CPT | Performed by: NURSE PRACTITIONER

## 2025-03-07 PROCEDURE — 36415 COLL VENOUS BLD VENIPUNCTURE: CPT | Mod: PO | Performed by: NURSE PRACTITIONER

## 2025-03-07 PROCEDURE — 84439 ASSAY OF FREE THYROXINE: CPT | Performed by: NURSE PRACTITIONER

## 2025-03-07 PROCEDURE — 80053 COMPREHEN METABOLIC PANEL: CPT | Performed by: NURSE PRACTITIONER

## 2025-03-09 ENCOUNTER — PATIENT MESSAGE (OUTPATIENT)
Dept: INTERNAL MEDICINE | Facility: CLINIC | Age: 70
End: 2025-03-09
Payer: MEDICARE

## 2025-03-09 DIAGNOSIS — E05.90 SUBCLINICAL HYPERTHYROIDISM: ICD-10-CM

## 2025-03-09 DIAGNOSIS — R73.03 PREDIABETES: Primary | ICD-10-CM

## 2025-03-11 RX ORDER — METFORMIN HYDROCHLORIDE 500 MG/1
500 TABLET, EXTENDED RELEASE ORAL
Qty: 90 TABLET | Refills: 3 | Status: SHIPPED | OUTPATIENT
Start: 2025-03-11 | End: 2026-03-11

## 2025-03-31 ENCOUNTER — PATIENT MESSAGE (OUTPATIENT)
Facility: CLINIC | Age: 70
End: 2025-03-31
Payer: MEDICARE

## 2025-03-31 DIAGNOSIS — E78.2 MIXED HYPERLIPIDEMIA: ICD-10-CM

## 2025-03-31 RX ORDER — ROSUVASTATIN CALCIUM 20 MG/1
20 TABLET, COATED ORAL
Qty: 90 TABLET | Refills: 0 | Status: SHIPPED | OUTPATIENT
Start: 2025-03-31

## 2025-03-31 NOTE — TELEPHONE ENCOUNTER
Provider Staff:  Action required for this patient    Requires labs      Please see care gap opportunities below in Care Due Message.    Thanks!  [facility]     Appointments      Date Provider   Last Visit   1/10/2024 Teresa Biswas MD   Next Visit   9/8/2025 Teresa Biswas MD     Refill Decision Note   Nguyen Ivan  is requesting a refill authorization.  Brief Assessment and Rationale for Refill:  Approve     Medication Therapy Plan:  FOVS      Comments:     Note composed:3:47 PM 03/31/2025

## 2025-03-31 NOTE — TELEPHONE ENCOUNTER
Care Due:                  Date            Visit Type   Department     Provider  --------------------------------------------------------------------------------                                MYCHART                              ANNUAL                              CHECKUP/PHY  [facility] INTERNAL  Last Visit: 01-      [facility]       Chinyere Biswas                              EP -                              PRIMARY      [facility] INTERNAL  Next Visit: 09-      CARE ([facility])   MEDICINE       Chinyere Biswas                                                            Last  Test          Frequency    Reason                     Performed    Due Date  --------------------------------------------------------------------------------    Office Visit  12 months..  cholecalciferol,,          01-   01-                             losartan, rosuvastatin...    Vitamin D...  12 months..  cholecalciferol,.........  Not Found    Overdue    [employer] Embedded Care Due Messages. Reference number: [id number].   3/31/2025 5:24:02 AM CDT

## 2025-04-01 DIAGNOSIS — F32.9 MAJOR DEPRESSION, CHRONIC: ICD-10-CM

## 2025-04-01 RX ORDER — BUPROPION HYDROCHLORIDE 75 MG/1
75 TABLET ORAL 2 TIMES DAILY
Qty: 60 TABLET | Refills: 3 | Status: SHIPPED | OUTPATIENT
Start: 2025-04-01

## 2025-04-01 NOTE — TELEPHONE ENCOUNTER
Incoming call received from patient. Assisted with scheduling an appointment with NP as patient will be out of town starting 4/22/2025.    Patient is scheduled on 4/07/2025 at 10:30am

## 2025-04-01 NOTE — TELEPHONE ENCOUNTER
YVES called patient; she states that she did get an appointment with RADAMES Pardo next Monday.     She felt like that she needed to share recent episode with her neurologist that happened about a week ago when she was driving and suddenly felt very disoriented in a familiar place. She describes that she felt turned around and that she didn't recognize the streets. She denied any external stressors that could have exacerbated anxiety.     She reports that she called her  who talked her down and helped her get back on track. She states that she was able to reorient eventually, but still feels a lot of fear about what happened.     She expressed appreciation for clinic's thoroughness in response. YVES will relay concerns to care team.

## 2025-04-02 NOTE — TELEPHONE ENCOUNTER
Called and spoke with patient. Patient requested to reschedule appointment time to 2pm. Obliged to patient's request and assisted with rescheduling appointment.    Patient is now scheduled on 4/07/2025 at 2pm.

## 2025-04-02 NOTE — TELEPHONE ENCOUNTER
----- Message -----   From: Velma Richards   Sent: 4/2/2025  12:33 PM CDT   To: Camille BARROW Staff; Edvin Calvert Staff   Subject: Appt Advise                                      Nguyen Ivan calling regarding Appointment Access  (message) for * pt advise nurse that she wanted a 2pm appt with Dr Wing NP pls advise

## 2025-04-07 ENCOUNTER — OFFICE VISIT (OUTPATIENT)
Facility: CLINIC | Age: 70
End: 2025-04-07
Payer: COMMERCIAL

## 2025-04-07 ENCOUNTER — LAB VISIT (OUTPATIENT)
Dept: LAB | Facility: HOSPITAL | Age: 70
End: 2025-04-07
Payer: COMMERCIAL

## 2025-04-07 VITALS
WEIGHT: 158.94 LBS | BODY MASS INDEX: 25.54 KG/M2 | HEIGHT: 66 IN | DIASTOLIC BLOOD PRESSURE: 76 MMHG | HEART RATE: 64 BPM | SYSTOLIC BLOOD PRESSURE: 122 MMHG

## 2025-04-07 DIAGNOSIS — G47.00 INSOMNIA, UNSPECIFIED TYPE: ICD-10-CM

## 2025-04-07 DIAGNOSIS — R73.03 PREDIABETES: ICD-10-CM

## 2025-04-07 DIAGNOSIS — R41.89 SUBJECTIVE COGNITIVE IMPAIRMENT: ICD-10-CM

## 2025-04-07 DIAGNOSIS — F32.9 MAJOR DEPRESSION, CHRONIC: ICD-10-CM

## 2025-04-07 DIAGNOSIS — E05.90 SUBCLINICAL HYPERTHYROIDISM: ICD-10-CM

## 2025-04-07 DIAGNOSIS — F41.9 ANXIETY: Primary | ICD-10-CM

## 2025-04-07 DIAGNOSIS — E53.8 B12 DEFICIENCY: ICD-10-CM

## 2025-04-07 LAB
ABSOLUTE EOSINOPHIL (OHS): 0.08 K/UL
ABSOLUTE MONOCYTE (OHS): 0.55 K/UL (ref 0.3–1)
ABSOLUTE NEUTROPHIL COUNT (OHS): 5.48 K/UL (ref 1.8–7.7)
ALBUMIN SERPL BCP-MCNC: 4.3 G/DL (ref 3.5–5.2)
ALP SERPL-CCNC: 41 UNIT/L (ref 40–150)
ALT SERPL W/O P-5'-P-CCNC: 27 UNIT/L (ref 10–44)
ANION GAP (OHS): 9 MMOL/L (ref 8–16)
AST SERPL-CCNC: 20 UNIT/L (ref 11–45)
BASOPHILS # BLD AUTO: 0.06 K/UL
BASOPHILS NFR BLD AUTO: 0.7 %
BILIRUB SERPL-MCNC: 0.8 MG/DL (ref 0.1–1)
BUN SERPL-MCNC: 17 MG/DL (ref 8–23)
CALCIUM SERPL-MCNC: 9.4 MG/DL (ref 8.7–10.5)
CHLORIDE SERPL-SCNC: 102 MMOL/L (ref 95–110)
CO2 SERPL-SCNC: 27 MMOL/L (ref 23–29)
CREAT SERPL-MCNC: 0.8 MG/DL (ref 0.5–1.4)
EAG (OHS): 114 MG/DL (ref 68–131)
ERYTHROCYTE [DISTWIDTH] IN BLOOD BY AUTOMATED COUNT: 12.8 % (ref 11.5–14.5)
FOLATE SERPL-MCNC: 16 NG/ML (ref 4–24)
GFR SERPLBLD CREATININE-BSD FMLA CKD-EPI: >60 ML/MIN/1.73/M2
GLUCOSE SERPL-MCNC: 118 MG/DL (ref 70–110)
HBA1C MFR BLD: 5.6 % (ref 4–5.6)
HCT VFR BLD AUTO: 43.1 % (ref 37–48.5)
HGB BLD-MCNC: 13.7 GM/DL (ref 12–16)
IMM GRANULOCYTES # BLD AUTO: 0.02 K/UL (ref 0–0.04)
IMM GRANULOCYTES NFR BLD AUTO: 0.2 % (ref 0–0.5)
LYMPHOCYTES # BLD AUTO: 2.31 K/UL (ref 1–4.8)
MAGNESIUM SERPL-MCNC: 1.8 MG/DL (ref 1.6–2.6)
MCH RBC QN AUTO: 29.4 PG (ref 27–31)
MCHC RBC AUTO-ENTMCNC: 31.8 G/DL (ref 32–36)
MCV RBC AUTO: 93 FL (ref 82–98)
NUCLEATED RBC (/100WBC) (OHS): 0 /100 WBC
PLATELET # BLD AUTO: 268 K/UL (ref 150–450)
PMV BLD AUTO: 9.6 FL (ref 9.2–12.9)
POTASSIUM SERPL-SCNC: 3.5 MMOL/L (ref 3.5–5.1)
PROT SERPL-MCNC: 7.1 GM/DL (ref 6–8.4)
RBC # BLD AUTO: 4.66 M/UL (ref 4–5.4)
RELATIVE EOSINOPHIL (OHS): 0.9 %
RELATIVE LYMPHOCYTE (OHS): 27.2 % (ref 18–48)
RELATIVE MONOCYTE (OHS): 6.5 % (ref 4–15)
RELATIVE NEUTROPHIL (OHS): 64.5 % (ref 38–73)
SODIUM SERPL-SCNC: 138 MMOL/L (ref 136–145)
T PALLIDUM IGG+IGM SER QL: NORMAL
T4 FREE SERPL-MCNC: 1.01 NG/DL (ref 0.71–1.51)
TSH SERPL-ACNC: 0.56 UIU/ML (ref 0.4–4)
WBC # BLD AUTO: 8.5 K/UL (ref 3.9–12.7)

## 2025-04-07 PROCEDURE — 83036 HEMOGLOBIN GLYCOSYLATED A1C: CPT

## 2025-04-07 PROCEDURE — 82374 ASSAY BLOOD CARBON DIOXIDE: CPT

## 2025-04-07 PROCEDURE — 83520 IMMUNOASSAY QUANT NOS NONAB: CPT

## 2025-04-07 PROCEDURE — 84393 TAU PHOSPHORYLATED EA: CPT

## 2025-04-07 PROCEDURE — 82746 ASSAY OF FOLIC ACID SERUM: CPT

## 2025-04-07 PROCEDURE — 36415 COLL VENOUS BLD VENIPUNCTURE: CPT

## 2025-04-07 PROCEDURE — 86593 SYPHILIS TEST NON-TREP QUANT: CPT

## 2025-04-07 PROCEDURE — 85025 COMPLETE CBC W/AUTO DIFF WBC: CPT

## 2025-04-07 PROCEDURE — 83735 ASSAY OF MAGNESIUM: CPT

## 2025-04-07 PROCEDURE — 83921 ORGANIC ACID SINGLE QUANT: CPT

## 2025-04-07 PROCEDURE — 84439 ASSAY OF FREE THYROXINE: CPT

## 2025-04-07 PROCEDURE — 99999 PR PBB SHADOW E&M-EST. PATIENT-LVL III: CPT | Mod: PBBFAC,,, | Performed by: NURSE PRACTITIONER

## 2025-04-07 PROCEDURE — 84443 ASSAY THYROID STIM HORMONE: CPT

## 2025-04-07 PROCEDURE — 84425 ASSAY OF VITAMIN B-1: CPT

## 2025-04-07 RX ORDER — TRAZODONE HYDROCHLORIDE 100 MG/1
100 TABLET ORAL NIGHTLY
Qty: 90 TABLET | Refills: 1 | Status: SHIPPED | OUTPATIENT
Start: 2025-04-07

## 2025-04-07 NOTE — PROGRESS NOTES
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 to portal-st    DILATION AND CURETTAGE OF UTERUS      Missed Ab    EYE SURGERY      cataracts    HERNIA REPAIR      LIVER LOBECTOMY      REDUCTION OF BOTH BREASTS  2009    TONSILLECTOMY  1970    TUBAL LIGATION  1985    with last delivery     UMBILICAL HERNIA REPAIR  2006       Family History   Problem Relation Name Age of Onset    Heart attack Father Mauro Carbone     Alcohol abuse Father Mauro Carbone             Depression Father Mauro Carbone     Dementia Mother Rosana Woods Raf     Alzheimer's disease Mother Rosana Woods Raf     Arthritis Mother Rosana Carbone             Cancer Mother Rosana Carbone     Hearing loss Mother Rosana Woods Ashley     Miscarriages / Stillbirths Mother Rosana Woods Ashley     Alcohol abuse Brother Carlitos Carbone             Kidney disease Brother Carlitos Carbone     Alcohol abuse Brother Dirk Carbone     Alcohol abuse Brother Jayme Carbone     Alcohol abuse  Brother Mauro Carbone Junior             Drug abuse Brother Mauro Carbone Junior     Kidney disease Brother Mauro Carbone Junior     Breast cancer Sister Pippa Jose 45        XRT    Cancer Sister Pippa Jose     Alcohol abuse Sister Josie Reyna     Miscarriages / Stillbirths Sister Amanda Urbina     Dementia Sister Amanda Urbina     Breast cancer Maternal Aunt      Breast cancer Paternal Aunt      Colon cancer Neg Hx      Ovarian cancer Neg Hx      Uterine cancer Neg Hx      Cervical cancer Neg Hx      Prostate cancer Neg Hx         Social History     Socioeconomic History    Marital status:    Tobacco Use    Smoking status: Never    Smokeless tobacco: Never   Substance and Sexual Activity    Alcohol use: Yes     Alcohol/week: 0.0 - 4.0 standard drinks of alcohol     Comment: Social     Drug use: Yes     Types: Marijuana     Comment: [facility] gummies/medical marijuana    Sexual activity: Yes     Partners: Male     Birth control/protection: Post-menopausal, See Surgical Hx     Comment: tubal ligation 198     Social Drivers of Health     Financial Resource Strain: Low Risk  (2025)    Overall Financial Resource Strain (CARDIA)     Difficulty of Paying Living Expenses: Not hard at all   Food Insecurity: No Food Insecurity (2025)    Hunger Vital Sign     Worried About Running Out of Food in the Last Year: Never true     Ran Out of Food in the Last Year: Never true   Transportation Needs: No Transportation Needs (2025)    PRAPARE - Transportation     Lack of Transportation (Medical): No     Lack of Transportation (Non-Medical): No   Physical Activity: Insufficiently Active (2025)    Exercise Vital Sign     Days of Exercise per Week: 2 days     Minutes of Exercise per Session: 60 min   Stress: Stress Concern Present (2025)    [employer] [employer] - Occupational Stress Questionnaire     Feeling of Stress : Rather much   Housing Stability: Low Risk   (4/6/2025)    Housing Stability Vital Sign     Unable to Pay for Housing in the Last Year: No     Homeless in the Last Year: No       Medication:     Current Outpatient Medications on File Prior to Visit   Medication Sig Dispense Refill    acetaminophen (TYLENOL) 650 MG TbSR       ALPRAZolam (XANAX) 0.25 MG tablet Take 1 tablet (0.25 mg total) by mouth nightly as needed for Anxiety. 30 tablet 0    B-COMPLEX PLUS VIT C, CALCIUM, 300 mg-150 mg calcium Tab Take 1 tablet by mouth Daily.      buPROPion (WELLBUTRIN) 75 MG tablet TAKE 1 TABLET(75 MG) BY MOUTH TWICE DAILY 60 tablet 3    cholecalciferol, vitamin D3, 125 mcg (5,000 unit) capsule TAKE 1 CAPSULE BY MOUTH EVERY DAY 90 capsule 3    losartan (COZAAR) 25 MG tablet TAKE 1 TABLET(25 MG) BY MOUTH EVERY DAY 90 tablet 0    metFORMIN (GLUCOPHAGE-XR) 500 MG ER 24hr tablet Take 1 tablet (500 mg total) by mouth daily with breakfast. 90 tablet 3    rosuvastatin (CRESTOR) 20 MG tablet TAKE 1 TABLET(20 MG) BY MOUTH EVERY DAY 90 tablet 0    traZODone (DESYREL) 100 MG tablet Take 1 tablet (100 mg total) by mouth nightly as needed for Insomnia. 90 tablet 1    venlafaxine (EFFEXOR-XR) 75 MG 24 hr capsule Take 2 capsules (150 mg total) by mouth once daily. 180 capsule 3     No current facility-administered medications on file prior to visit.        Review of patient's allergies indicates:  No Known Allergies    Medications Reconciliation:   I have reconciled the patient's home medications and discharge medications with the patient/family. I have updated all changes.  Refer to After-Visit Medication List.    Objective:  Vital Signs:  There were no vitals filed for this visit.  Wt Readings from Last 3 Encounters:   03/06/25 1405 73.1 kg (161 lb 2.5 oz)   01/29/25 0947 75.2 kg (165 lb 12.6 oz)   10/17/24 1123 73.6 kg (162 lb 4.1 oz)     There is no height or weight on file to calculate BMI.           Neurological examination:  Mental Status:   Her appearance is normal (hygiene is  appropriate; attire is proper and clean).  Throughout the interview, she is cooperative, her eye contact is appropriate.  Her behavior is appropriate to the clinical context without impropriety or improper language/conduct.  Her behavior was not characterized by episodes of sudden uncontrollable and inappropriate laughing or crying.  The patient is awake; Her energy level appeared normal.  Her orientation is normal; Spatial 5/5 (location, the floor of building, city, county, state) and temporal 5/5 (month, day, year, [place]) dimensions are accurate.  She has appropriate attention/concentration (ELSIE/[place]IL[place] forwards and backward).  She can complete three-step commands.  Her fund of knowledge was appropriate for age, culture, and level of education.  Her thought process is logical and goal-oriented.  She demonstrated appropriate insight based on actions, awareness of her illness, plans for the future.  She demonstrated good judgment based on actions and plans for the future.  She has no evidence of hallucinations (auditory, visual, olfactory).  She has no evidence of delusions (paranoid, grandiose, bizarre).  Cranial Nerves:   Her pupils were normal.  Her visual fields were full to confrontation in all quadrants.  Her ocular pursuit in the horizontal and vertical plane was complete.  Her saccadic initiation, velocity, and amplitude are normal.  Her facial strength was normal.  Her facial expression was symmetric and appropriate to the context.  Her tongue showed no evidence of scalloping.  She tongue movement with normal.  Speech/Language:   The patient's speech was fluent, non-effortful, and her rate was appropriate to the context.  She has no articulation (segmental features) errors.  The patient's speech is not dysarthric.  The patient's speech was without evidence of anomia.  She makes no phonological loop errors.  Motor:   The patient's bilateral upper extremity muscle bulk is appropriate.  The patient's bilateral upper  extremity muscle tone is not increased.  Coordination:   She has no visible tremor.  Higher Cortical Function:   The patient showed no evidence of visuospatial constructional dysfunction.  Gait:   She can arise from a chair and sit back down without using their arms.  Her gait was normal.  Neuropsychological Evaluation Summary:  Prior Neurocognitive/Neurobehavioral Evaluation(s)  Informal Neurocognitive Assessment 8/18/21  - MMSE 28/28  - MOCA 27/29  2021-10-05:  Questionable Visuospatial Impairment: visuospatial construction.  BEHAV5+ 1/6: See ROS section for a full description  2022-04-19:  Subjective Cognitive impairment - Questionable Visuospatial Impairment: visuospatial construction.  BEHAV5+ 2/6: See ROS section for a full description  2023-02-14:  Subjective Cognitive impairment - Questionable Visuospatial Impairment: visuospatial construction.  Neurocognitive/Neurobehavioral Evaluation completed on 2024-03-28    Neuropsychiatric/Behavioral Focused Evaluation Assessment    BEHAV5+ 2/6 See ROS section for a full description   Laboratories:     Lab Date Value [Reference]   Neurodegenerative Cerebrospinal Fluid Assessment           Abeta42 2022, Doe-15    888 [>1026 pg/mL]      p-Tau/Abeta42 2022, Doe-15    0.015 [<=0.023 ratio]      Phospho-Tau (181P) 2024, Feb-23    0.67 [0.00 - 0.97 pg/mL]      Total Tau 2022, Doe-15    169 [<=238 pg/mL]      Cerebrospinal Fluid Assessment   Albumin, CSF 2022, Doe-15    24.8 [<=27.0 ]      Glucose, CSF 2022, Doe-15    57 [40 - 70 mg/dL]      IgG 2024, Feb-23    657 [650 - 1600 mg/dL]      [place] Free Light Chain, CSF 2022, Doe-15    0.0108 [<0.1000 mg/dL]      Neuron Specific Enolase, CSF 2022, Doe-15    19 (H) [ng/mL]      Protein, CSF 2022, Doe-15    52 (H) [15 - 40 mg/dL]      VDRL, CSF 2022, Doe-15    Negative      Appearance, CSF 2022, Doe-15    Clear [Clear]      COLOR CSF 2022, Doe-15    Colorless [Colorless]      Lymphs, CSF 2022, Doe-15    76 [40 - 80 %]       Mono/Macrophage, CSF 2022, Doe-15    18 [15 - 45 %]      RBC, CSF 2022, Doe-15    239 (A) [0 /cu mm]      Segmented Neutrophils, CSF 2022, Doe-15    6 [0 - 6 %]      WBC, CSF 2022, Doe-15    1 [0 - 5 /cu mm]      Autoimmune/Paraneoplastic Screening   Antigliadin Ab IgG 2022, Jan-21    1.0 [<7.0 U/mL]      Antigliadin Abs, IgA 2022, Jan-21    0.7 [<7.0 U/mL]      Immunoglobulin A (IgA) 2022, Jan-21    127 [70 - 400 mg/dL]      PNEOE AGNA-1, Csf 2022, Jun-15    Negative      PNEOE YUSRA-1, Csf 2022, Jun-15    Negative      PNEOE YUSRA-2, Csf 2022, Jun-15    Negative      PNEOE YUSRA-3, Csf 2022, Jun-15    Negative      PNEOE, CRMP-5-IgG, Csf 2022, Doe-15    Negative      PNEOE, Amphiphysin Ab, Csf 2022, Jun-15    Negative      PNEOE, PCA-1, Csf 2022, Jun-15    Negative      PNEOE, PCA-2, Csf 2022, Jun-15    Negative      PNEOE, PCA-Tr, Csf 2022, Doe-15    Negative      TTG IgA 2022, Jan-21    <0.2 [<7.0 U/mL]      TTG IgG 2022, Jan-21    <0.6 [<7.0 U/mL]      Coagulopathy Screening   aPTT 2021, Aug-18    22.8 [21.0 - 32.0 sec]      Metabolic Screening   Hemoglobin A1C External 08/18/2021  5.8 (H) [4.0 - 5.6 %]      Homocysteine 08/18/2021  9.5 [4.0 - 15.5 umol/L]      Lipase 08/18/2021  28 [4 - 60 U/L]      Methlymalonic Acid 08/18/2021  0.14      T4 Total 01/19/2022  8.2 [4.5 - 11.5 ug/dL]      TSH 11/13/2020  0.626 [0.400 - 4.000 uIU/mL]      Glucose 2024, Feb-23    138 (H) [70 - 110 mg/dL]      Albumin 2024, Feb-23    3.9 [3.5 - 5.2 g/dL]      Alkaline Phosphatase 2021, Dec-16    46 (L) [55 - 135 U/L]      ALT 2024, Feb-23    23 [10 - 44 U/L]      AST 2024, Feb-23    19 [10 - 40 U/L]      BILIRUBIN TOTAL 2024, Feb-23    0.6 [0.1 - 1.0 mg/dL]      PROTEIN TOTAL 2024, Feb-23    6.8 [6.0 - 8.4 g/dL]      Cholesterol 2020, Nov-13    225 (H) [120 - 199 mg/dL]      HDL 2022, Jan-21    68 [40 - 75 mg/dL]      Non-HDL Cholesterol 2022, Jan-21    95 [mg/dL]      Triglycerides 02/23/2024  125 [30 - 150 mg/dL]      B12 Def.  Methylmalonic Acid 08/18/2021  0.13 [<=0.40 nmol/mL]      Folate 08/18/2021  19.1 [4.0 - 24.0 ng/mL]      Vit D, 25-Hydroxy 08/18/2021  41 [30 - 96 ng/mL]      Vitamin B-12 08/18/2021  745 [180 - 914 ng/L]      Neurodegenerative Serum Fluid Assessment   NEUROFILAMENT LIGHT CHAIN, PLASMA 2024, Feb-23    9.5 [<=37.9 pg/mL]      Infectious Disease/Immunocompromised Screening   SARS Coronavirus 2 Antigen 2022, Jan-19    Positive !      SARS-CoV-2 RNA, Amplification, Qual 2021, Mar-18    Negative      Stool WBC 2022, Jan-20    No neutrophils seen [No neutrophils seen]      Hepatitis C Ab 08/18/2021  Non-reactive [Non-reactive ]      HIV 1/2 Ag/Ab 08/18/2021  Negative      Syphilis Treponemal Ab 08/18/2021  Nonreactive [Nonreactive]      Standard Hematology Screen   Hematocrit 2024, Feb-23    42.8 [37.0 - 48.5 %]      Hemoglobin 2024, Feb-23    13.3 [12.0 - 16.0 g/dL]      MCV 2024, Feb-23    94 [82 - 98 fL]      Platelets 2021, Dec-16    329 [150 - 450 K/uL]      Neuroendocrine/Electrolyte Screening   BUN 2024, Feb-23    13 [8 - 23 mg/dL]      Chloride 2024, Feb-23    106 [95 - 110 mmol/L]      Creatinine 2024, Feb-23    0.8 [0.5 - 1.4 mg/dL]      Potassium 2024, Feb-23    3.8 [3.5 - 5.1 mmol/L]      Sodium 2024, Feb-23    142 [136 - 145 mmol/L]      Delirium Screening   Glucose, UA 2021, Dec-16    Negative      Ketones, UA 2021, Dec-16    Negative      Leukocytes, UA 2021, Dec-16    Negative      NITRITE UA 2021, Dec-16    Negative      Protein, UA 2021, Dec-16    Negative           Neuroimaging:    MRI brain/head without contrast on 10/04/2021  Technique: Multiplanar multisequence MR imaging of the brain was performed without intravenous contrast.  Formal interpretation by Radiology:  No evidence of acute or chronic intracranial pathology.  Independently reviewed radiological imaging by Suraj Ravi MD. MPH. Behavioral Neurologist  T1: No significant cortical atrophy with age-appropriate changes. Subcortical  nuclei and hippocampi are without significant atrophy.  T2/FLAIR: No Significant hyperintensities appreciated on MRI T2/FLAIR  DWI/ADC: No Significant DWI hyperintensities/hypointensities. No ADC correlation.  SWI/GRE: No Significant hypointensities to suggest cortical/subcortical hemosiderin deposition.  Impression: : Normal Brain Imaging.    MRI brain/head without contrast on 9/6/2012  Formal interpretation by Radiology:  not available  Independently reviewed radiological imaging by Suraj Ravi MD. MPH. Behavioral Neurologist  T1: No significant cortical atrophy with age-appropriate changes. Subcortical nuclei and hippocampi are without significant atrophy.  T2/FLAIR: No Significant hyperintensities appreciated on MRI T2/FLAIR  DWI/ADC: No Significant DWI hyperintensities/hypointensities. No ADC correlation.  SWI/GRE: No Significant hypointensities to suggest cortical/subcortical hemosiderin deposition.  Impression: : Normal Brain Imaging.    CT brain/head without contrast on 01/01/2019  Formal interpretation by Radiology:  not available  Independently reviewed radiological imaging by Suraj Ravi MD. MPH. Behavioral Neurologist  Impression: : No significant cortical atrophy with age-appropriate changes. Subcortical nuclei and hippocampi are without significant atrophy.     Procedures:    Electrocardiogram on 12/16/2021  Formal interpretation:  Vent. Rate : 067 BPM     Atrial Rate : 067 BPM    P-R Int : 148 ms          QRS Dur : 084 ms     QT Int : 398 ms       P-R-T Axes : 061 043 060 degrees    QTc Int : 420 ms Normal sinus rhythm Possible Left atrial enlargement Nonspecific ST and/or T wave abnormalities Abnormal ECG  Independently reviewed Electrocardiogram by Suraj Ravi MD. MPH. Behavioral Neurologist  Impression: : Received ECG has no evidence of sinus node disease. HR (>=50-60). Prolonged ND interval (>0.22 s). Broad QRS complex (> 0.12 s).     Clinical Summary:     The patient is a  70-year-old right-handed female with a relevant past medical history of Breast CA s/p B mastectomy on letrozole, Depression/Anxiety, who presents reporting a 5-year history of gradually progressive neurocognitive impairment.       The clinical history is suggestive of:  Executive Impairment: Working Memory  Language Impairment: Energization  Psychiatric Impairment: Neurovegetative, Social Coherence, Signal-Noise Dysregulation, Mood Regulation, Stimulation Dysregulation  The neurological examination is significant for:  Normal Neurological Examination  The neurocognitive battery is significant (based on age and education) for:  Subjective Cognitive impairment - Questionable Visuospatial Impairment: visuospatial construction.  BEHAV5+ 2/6: See ROS section for a full description  The neurologically relevant imaging is significant for  MRI brain/head without contrast (10/04/2021): Normal Brain Imaging.  MRI brain/head without contrast (9/6/2012): Normal Brain Imaging.  CT brain/head without contrast (01/01/2019): No significant cortical atrophy with age-appropriate changes. Subcortical nuclei and hippocampi are without significant atrophy.        Assessment:        The patient's clinical presentation is amnestic predominant subjective cognitive impairment insufficient to interfere with activities of daily living (CDR-SOB: 0.5 - Questionable cognitive impairment).  The neurobehavioral effects of aromatase inhibitors (AI) likely depend upon when during the lifespan such drugs are administered. Post-menopausal women treated with such drugs are more likely to show memory deficits, increased pain response, and other emotional responses. That said, while great effort has been invested in studying cognitive dysfunction and AI therapy, controlling confounders such as prior chemotherapy received and having breast cancer is necessary to fully determine whether any association exists.At present, all neurodegenerative diseases can  only be diagnosed with 100% certainty through a brain autopsy. The suspected neuropathology underlying the patient's neurocognitive impairment is most likely primarily due to Vascular Contributions to Cognitive Impairment and Dementia.  Plasma Protein Biomarkers: [02/23/2024] Neurofilament Light Chain (Nfl): 9.5.  Cerebrospinal Fluid Protein Biomarkers: [02/23/2024] Phospho-Tau (181P): 0.67. [06/15/2022] NSE: 19 (H). [06/15/2022] NSE: 19 (H). p-Tau/Abeta42: 0.015 (Negative). Abeta42: 888 (Positive). Total Tau: 169 (Negative). Phospho-Tau (181P): 13 (Negative).  There are no dermatological protein biomarkers available on record.  NEGATIVE - Sequence analysis and deletion/duplication testing of the 57 genes listed in the Genes Analyzed section. [facility] Hereditary Parkinson Disease and Parkinsonism Panel [facility] Hereditary Amyotrophic Lateral Sclerosis, Frontotemporal Dementia and Alzheimer Disease Panel     The patient presents with a fluctuating five-year history of progressive cognitive impairment. The patient reports a recent disorientation episode while driving, resulting in transient confusion and emotional distress, likely exacerbated by situational stressors including concern for her son's international travel. She continues to experience fragmented sleep with late sleep onset, frequent nocturnal awakenings, and recent discontinuation of Belsomra due to inefficacy. She currently takes trazodone and intermittently uses Xanax, both of which may contribute to cognitive symptoms. She endorses increased irritability, particularly toward her spouse, and is participating in couples therapy. Despite these concerns, she maintains a physically and socially active lifestyle. Cognitive testing is pending due to staff unavailability. Updated screening biomarkers and brain MRI have been ordered, with follow-up scheduled with Dr. Obrien. Patient has been counseled on the potential need for lumbar puncture if biomarkers are  positive. Referral to sleep medicine was recommended. Patient and spouse are planning international travel from 4/22/25 to 5/2/25.  The observations made above, were discussed with the patient. We have discussed the additional diagnostic(s) and/or managenent below.     Care Management Plan:    #Diagnostic Screening for reversible forms of neurocognitive disorders  We recommend screening for reversible causes of neurocognitive impairment with plasma laboratories  We have ordered plasma CBC, CMP, Vitamins (B1, B9, B12), Mg, RPR, MMA, TSH, T4, Nfl, ptau-181 serum  We will obtain a brain MRI.   #Optimize Neurocognitive Impairment and Quality  We have discussed the MIND Diet and other lifestyle behavior that may help maintain brain health.  We have provided written/digital reading material  #Optimize Behavioral Management and Quality.  No indication for memantine at this time  #Optimize Sleep Hygiene and Quality  We discussed and recommended additional diagnostic/management of sleep disorder to optimize brain health and longevity. I have placed a referral.  Discontinued Belsomra- discussed cognitive changes with chronic use of xanax and trazodone.    #Optimize Cerebrovascular Health.  The patient has a documented history of hyperlipidemia and/or hypercholesteremia with long-term complications such as cerebrovascular disease, peripheral vascular disease, and/or aortic atherosclerosis. Collectively these risk factors may contribute to cerebral atherosclerosis, and cerebral hypoperfusion compounded neurocognitive disorder. We discussed maximizing cerebrovascular-related medical therapy, including but not limited to cholesterol medications and antiplatelet agents. We have discussed the value of aggressively controlling vascular risk factors like hypertension, hyperlipidemia, and Diabetes SBP<130, LDL<100, and A1C<7.0. We discussed the need to optimize lifestyle choices, including a heart-healthy diet (e.g., Mediterranean or  DASH), increased cardiovascular exercise (goal 150 minutes of moderate-intensity per week), and staying cognitively and socially active.  Continue aspirin 81 mg  #Behavioral/Environmental Strategies  We recommend engaging in activities that stimulate cognitively and socially while avoiding excessive stimulation and fatigue in overwhelmingly complex situations.  We recommend integrating routine and schedule into your daily life. https://www.alzheimersproject.org/news/the-importance-of-routine-and-familiarity-to-persons-with-dementia/  #Health Maintenance/Lifestyle Advice  We have discussed the value in aggressively controlling vascular risk factors like hypertension, hyperlipidemia, and Diabetes SBP<130, LDL<100, A1C<7.0.  We discussed the need to optimize lifestyle choices including a heart-healthy diet (e.g., Mediterranean or DASH), increased cardiovascular exercise (goal 150 minutes of moderate-intensity per week), and stay cognitively and socially active.  #Support  We all need support sometimes. Get easy access to local resources, community programs, and services. https://www.communityresourcefinder.org/  Learn more about Cognitive Impairment in [place]: https://www.alz.org/professionals/public-health/state-overview[place]  #Follow up:  Follow-up with Dr. Obrien to review lab and MRI results.     Thank you for allowing us to participate in the care of your patient. Please do not hesitate to contact us with any questions or concerns.     It was a pleasure seeing The patient and we look forward to seeing them at their follow-up visit.     This note is dictated on M*Modal Fluency Direct word recognition program. There are word recognition mistakes that are occasionally missed on review.       Scheduled Follow-up :  Future Appointments   Date Time Provider Department Center   4/7/2025  2:00 PM Nehal Pardo NP [facility] [address]   8/7/2025 11:30 AM [facility] D[facility] [place]   9/8/2025 11:30 AM Teresa Biswas,  MD [facility]       After Visit Medication List :     Medication List            Accurate as of April 6, 2025  8:07 PM. If you have any questions, ask your nurse or doctor.                CONTINUE taking these medications      acetaminophen 650 MG Tbsr  Commonly known as: TYLENOL     ALPRAZolam 0.25 MG tablet  Commonly known as: XANAX  Take 1 tablet (0.25 mg total) by mouth nightly as needed for Anxiety.     B-COMPLEX PLUS VIT C (CALCIUM) 300 mg-150 mg calcium Tab  Generic drug: vit B comp with C-calcium carb     buPROPion 75 MG tablet  Commonly known as: WELLBUTRIN  TAKE 1 TABLET(75 MG) BY MOUTH TWICE DAILY     cholecalciferol (vitamin D3) 125 mcg (5,000 unit) capsule  TAKE 1 CAPSULE BY MOUTH EVERY DAY     losartan 25 MG tablet  Commonly known as: COZAAR  TAKE 1 TABLET(25 MG) BY MOUTH EVERY DAY     metFORMIN 500 MG ER 24hr tablet  Commonly known as: GLUCOPHAGE-XR  Take 1 tablet (500 mg total) by mouth daily with breakfast.     rosuvastatin 20 MG tablet  Commonly known as: CRESTOR  TAKE 1 TABLET(20 MG) BY MOUTH EVERY DAY     traZODone 100 MG tablet  Commonly known as: DESYREL  Take 1 tablet (100 mg total) by mouth nightly as needed for Insomnia.     venlafaxine 75 MG 24 hr capsule  Commonly known as: EFFEXOR-XR  Take 2 capsules (150 mg total) by mouth once daily.              Signing Physician:  Nehal Pardo NP    Billing:       Billing Statement:     I spent a total of 84 minutes (from 02:01 PM to 03:25 PM) on 04/07/2025, engaging in person in a face-to-face consultation with the patient. More than 50% of this time was devoted to counseling on symptoms, treatment plans, risks, therapeutic options, lifestyle modifications, and safety concerns related to the above diagnoses.     A Review of Systems was completed, encompassing 10 of the 14 systems. All findings were negative, except those noted in the History of Present Illness (HPI) and Review of Systems (ROS) Sections. The systems reviewed were  Constitutional (Const), Eyes, Ear/Nose/Throat (ENT), Respiratory (Resp), Cardiovascular (CV), Gastrointestinal (GI), Genitourinary (), Musculoskeletal (MSK), Skin, and Neurological (Neuro).     I spent a total of 10 minutes reviewing and summarizing records from outside physicians on the day of the clinical evaluation. This review and summary were conducted as described in the History of Present Illness (HPI) and Assessment.     I performed a neurobehavioral status examination on the day of clinical evaluation that included a clinical assessment of thinking, reasoning, and judgment to ensure a comprehensive approach in managing the complex and evolving needs of the patient's neurocognitive condition. Please see above HPI and ROS for full details. This exam was performed on the day of clinical evaluation and included 15 minutes spent on direct face-to-face clinical observation and interview with the patient and 16 minutes spent interpreting test results and preparing the report. The total time of 31 minutes spent on the neurobehavioral status examination is not included in the time spent on evaluation and management coding.     Total Billing time spent on encounter/documentation for this patient's evaluation and management, not including the Neurobehavioral Status Examination: 79 minutes.

## 2025-04-08 ENCOUNTER — TELEPHONE (OUTPATIENT)
Dept: INTERNAL MEDICINE | Facility: CLINIC | Age: 70
End: 2025-04-08
Payer: MEDICARE

## 2025-04-08 DIAGNOSIS — F32.9 MAJOR DEPRESSION, CHRONIC: ICD-10-CM

## 2025-04-08 RX ORDER — BUPROPION HYDROCHLORIDE 75 MG/1
75 TABLET ORAL 2 TIMES DAILY
Qty: 180 TABLET | Refills: 3 | Status: SHIPPED | OUTPATIENT
Start: 2025-04-08

## 2025-04-08 NOTE — TELEPHONE ENCOUNTER
Spoke with pt regarding refill for Wellbutrin. Pt stated that she is taking 75 mg in the Am and 75 mg In the Pm. Pt also state that she does not need a refill until next week but [place] and she does not mind getting a refill right now

## 2025-04-09 LAB
IMMUNOLOGIST REVIEW: NORMAL
M PHOSPHO-TAU 217: 0.17 PG/ML
NEUROFILAMENT LIGHT CHAIN, PLASMA: 16.4 PG/ML
VIT B12 SERPL-MCNC: 651 NG/L (ref 180–914)

## 2025-04-10 ENCOUNTER — HOSPITAL ENCOUNTER (OUTPATIENT)
Dept: RADIOLOGY | Facility: HOSPITAL | Age: 70
Discharge: HOME | End: 2025-04-10
Attending: NURSE PRACTITIONER
Payer: COMMERCIAL

## 2025-04-10 DIAGNOSIS — R41.89 SUBJECTIVE COGNITIVE IMPAIRMENT: ICD-10-CM

## 2025-04-10 PROCEDURE — 70551 MRI BRAIN STEM W/O DYE: CPT | Mod: TC

## 2025-04-10 PROCEDURE — 70551 MRI BRAIN STEM W/O DYE: CPT | Mod: 26,,, | Performed by: RADIOLOGY

## 2025-04-11 LAB
W METHYLMALONIC ACID: 0.17 UMOL/L
W VITAMIN B1: 142 UG/L

## 2025-04-22 ENCOUNTER — OFFICE VISIT (OUTPATIENT)
Facility: CLINIC | Age: 70
End: 2025-04-22
Payer: COMMERCIAL

## 2025-04-22 DIAGNOSIS — Z79.811 AROMATASE INHIBITOR USE: ICD-10-CM

## 2025-04-22 DIAGNOSIS — Z81.8 FAMILY HISTORY OF FIRST DEGREE RELATIVE WITH DEMENTIA: ICD-10-CM

## 2025-04-22 DIAGNOSIS — G47.33 OSA (OBSTRUCTIVE SLEEP APNEA): ICD-10-CM

## 2025-04-22 DIAGNOSIS — R41.89 SUBJECTIVE COGNITIVE IMPAIRMENT: Primary | ICD-10-CM

## 2025-04-22 DIAGNOSIS — N95.9 POST MENOPAUSAL PROBLEMS: ICD-10-CM

## 2025-04-22 DIAGNOSIS — C50.919 HORMONE RECEPTOR POSITIVE MALIGNANT NEOPLASM OF BREAST, UNSPECIFIED LATERALITY: ICD-10-CM

## 2025-04-22 DIAGNOSIS — F32.9 MAJOR DEPRESSION, CHRONIC: ICD-10-CM

## 2025-04-22 NOTE — PROGRESS NOTES
[facility]  Brain Health and Cognitive Disorders Program     PATIENT: Nguyen Ivan  VISIT DATE: 2025  MRN: [id number]  PRIMARY PROVIDER: Teresa Biswas MD  : 1955    Assessment:     The patient is a 70-year-old with a relevant past medical history of Breast CA s/p B mastectomy on letrozole, MDD/HOMA and Pre-DM2 who presents reporting a 6-year history of fluctuating neurocognitive impairment.    The patient's clinical profile, considering their history and age/education-adjusted cognitive benchmarks, strongly indicates subjective cognitive impairment.  Global Clinical Dementia Rating (CDR): 0.5/3 suggestive of Questionable cognitive impairment.  Clinical Dementia Rating Sum of Boxes (CDR-SOB): 0.5/18 suggestive of Normal.  [facility] Functional Assessment Scale (FAS): 1/30 suggestive of Normal.  Functional Assessment Staging Tool (FAST Scale): 0/16  Neal-Von Instrumental Activities of Daily Living Scale: 0/8 suggestive of Normal.     The patient's clinical syndromic presentation has features suggestive of Post-Menstrual Syndrome Cognitive Impairment (Macey et al., ) and Cognitive impairment after cancer treatment/Aromatase inhibitors (AI)  (Argentina et al., ).  Age Range: Typically occurs in women aged 40-55 years.  Attention and concentration (e.g., trouble focusing, easily distracted)  Processing speed (e.g., slower thinking or response time)  Menopausal Symptoms: Vasomotor symptoms (hot flashes, night sweats)  Sleep disturbances (insomnia, non-restorative sleep)  Mood changes (increased anxiety, depression, irritability)  Symptoms may fluctuate in severity but show a clear onset during the perimenopausal transition.    At present, all neurodegenerative diseases can only be diagnosed with 100% certainty through a brain autopsy. The suspected neuropathology underlying the patient's neurocognitive impairment is likely a mixture of Vascular Contributions to Cognitive Impairment and Dementia  (VCID).  Serum fluid protein biomarkers include Phospho-Tau (217P) Serum: 0.165 (N) on 04/07/2025 Neurofilament Light Chain: 9.5 (N) on 02/23/2024 16.4 (N) on 04/07/2025 Phospho-Tau (181P) Serum: 0.67 (N) on 02/23/2024  Cerebrospinal fluid protein biomarkers include NSE CSF: 19 (H) on 06/15/2022 p-Tau/Abeta42 CSF: 0.015 (N) on 06/15/2022  There are no dermatological protein biomarkers available on record.  There is no relevant genetic biomarkers available on record.  Neurological Radiographic biomarkers include  MRI brain/head without contrast performed on 04/10/2025  The findings remain unchanged compared to the 2021 MRI of the brain. The imaging of the brain appears normal. There is no significant cortical or subcortical atrophy observed. Furthermore, there are no notable T2/FLAIR hyperintensities or abnormalities detected on the DWI, ADC, or SWI sequences.  Radiologist Interpretation: Unremarkable noncontrast MRI brain as detailed above specifically without evidence for acute infarction or hydrocephalus.  MRI brain/head without contrast performed on 10/04/2021  Normal Brain Imaging.            Impression:     The patient has a 6-year history of fluctuating subjective cognitive impairment, which began in 2019. Initially, the primary symptoms were difficulties with memory and orientation, and these remain the most significant concerns. Over the years, she has experienced varying cognitive issues, including deficits in working memory and difficulties in word-finding, often exacerbated by anxiety related to her family history of Alzheimer's disease. Despite multiple assessments indicating subjective cognitive impairment, objective tests such as MRI and cerebrospinal fluid analysis have shown no evidence of active Alzheimer's disease processes, frontotemporal degeneration, or significant atrophy. The patient's cognitive challenges may be linked to a combination of factors, including cognitive impairment related to  cancer treatment, estrogen deprivation due to aromatase inhibitor therapy, and the effects of polypharmacy. Recent episodes of disorientation, such as losing familiarity with known locations, have increased her anxiety. A neurological examination reveals mild executive impairment, particularly in working memory, but normal functioning in other cognitive areas. No signs of neurodegenerative processes were detected, suggesting that the cognitive impairment is likely multifactorial. The overall neurobehavioral profile is consistent with subjective cognitive impairment, likely influenced by vascular changes and polypharmacy, rather than a specific neurocognitive disorder. It is recommended that the patient be referred to sleep medicine for treatment of possible obstructive sleep apnea. A second opinion from an obstetrician-gynecologist is advised to explore alternative therapies, given her personal history of breast cancer and subsequent aromatase inhibitor therapy. A referral to psychiatry is also recommended due to concerns about polypharmacy. We have addressed all of the patient's questions and concerns.    The above observations were discussed with the patient and their supporting historian(s). We have discussed the additional diagnostic(s) and/or management below.            Care Management Plan:     Optimize Neurocognitive Impairment and Quality  We have discussed and/or provided information regarding Cognitive Rehabilitation Strategies Techniques such as mnemonic devices and external memory aids, such as calendars and reminder notes, to support memory function. Recommend problem-solving exercises and tasks that promote organizational skills to strengthen executive functioning.  We have discussed the MIND Diet and other lifestyle behaviors that may help maintain brain health.  We have provided written/digital reading material.  No indication for donepezil at this time  May consider the addition of modafinil next  appointment  Consider 2nd opinion for estrogen therapy with ADAMA Cancino MD  Optimize Behavioral Management and Quality  We have discussed the value of behavioral interventions e.g. structured daily routines and engage the patient in cognitively stimulating activities to mitigate symptoms of agitation and enhance mood stability.  We have discussed the potential benefits and risks of medications aimed at managing neuropsychiatric symptoms, ensuring alignment with the patient's overall health status.  No indication for the use of memantine at this time  Continue venlafaxine 75 mg daily  Continue Wellbutrin 75 mg b.i.d.  Recommend limiting Xanax 0.25 mg  Referral to Psychiatry for additional recommendations  Optimize Cerebrovascular Health.  The patient has a documented history of hyperlipidemia and/or hypercholesteremia with long-term complications such as cerebrovascular disease, peripheral vascular disease, and/or aortic atherosclerosis. Collectively these risk factors may contribute to cerebral atherosclerosis, and cerebral hypoperfusion compounded neurocognitive disorder. We discussed maximizing cerebrovascular-related medical therapy, including but not limited to cholesterol medications and antiplatelet agents. We have discussed the value of aggressively controlling vascular risk factors like hypertension, hyperlipidemia, and Diabetes SBP<130, LDL<100, and A1C<7.0. We discussed the need to optimize lifestyle choices, including a heart-healthy diet (e.g., Mediterranean or DASH), increased cardiovascular exercise (goal 150 minutes of moderate-intensity per week), and staying cognitively and socially active.  continue Crestor 20 mg daily  Optimize Sleep Hygiene and Quality  We discussed and recommended additional diagnostic/management of sleep disorder to optimize brain health and longevity.  We have placed referrals to sleep medicine due to signs and symptoms suggestive of sleep apnea.  Continue  trazodone 100 mg daily     The care plan was developed collaboratively with the patient and caregiver, addressing their goals of maintaining functional independence while planning for potential disease progression.     Thank you for entrusting us with the care of your patient. Should you have any questions or concerns, please feel free to contact us at your convenience.            Recent Clinical History:    Chief Complaint: Progressive Cognitive Impairment    Clinical Summary: The patient has experienced fluctuating cognitive concerns over the past six years, with a family history of Alzheimer's disease first noted in 2019. Although multiple assessments have placed the patient in the subjective cognitive impairment range, she has reported increasing anxiety due to her family history of late-onset Alzheimer's disease. The patient is experiencing working memory deficits and word-finding difficulties, which appear to be associated with insomnia and the use of multiple medications. There is also a history of cognitive concerns potentially related to cancer-related cognitive impairment and estrogen deprivation, which may be exacerbated by polypharmacy. Initial concerns regarding subjective cognitive impairment were assessed in 2022 and re-evaluated in 2023. The patient underwent cerebrospinal fluid testing in 2022, which showed no evidence of an active Alzheimer's disease-related process. Repeat serum biomarker monitoring over the last three years has been stable. These concerns were somewhat alleviated by MRI results and serum phospho-tau levels, both of which were within normal limits. We recommend discontinuing trazodone, increasing the dosage of Belsomra, and emphasizing sleep hygiene practices to improve the patient's condition. Additionally, we advise conducting annual screenings for peripheral biomarkers of degeneration to closely monitor the patient's situation. The clinical presentation likely involves mild  vascular changes contributing to cognitive impairment, potentially related to aromatase inhibitor therapy. This approach aims to address and mitigate factors contributing to the patient's cognitive symptoms while monitoring for any indications of neurodegenerative processes.    Clinical Interim: Since her last appointment, the patient has reported experiencing increased anxiety following an episode of disorientation. She felt it was important to discuss this recent incident with her neurologist. Approximately a week ago, while driving in a familiar area, she suddenly became very disoriented. She described feeling confused and unable to recognize the streets. She denied the presence of any external stressors that could have exacerbated her anxiety. A month ago, the dosage of Wellbutrin was reduced from 150 mg to 75 mg twice daily. The patient has been off Belsomra since mid-2024 and is currently taking Trazodone at a dose of 100 mg since the same time. Since her last visit, the patient has completed a brain MRI, which shows no evidence of interval worsening, atrophy, volume loss, or white matter disease. Her serum laboratory results remain unchanged since her last appointment, with repeat neurofilament light chain and phospho-tau 217 levels well within normal limits. We discussed that there is no evidence of frontotemporal degeneration, Alzheimer's disease, or worsening cortical atrophy over the past year. Her symptoms remain consistent with subjective cognitive impairment, likely related to a host of multifactorial issues. These include untreated sleep apnea, for which the patient is scheduled for a sleep medicine evaluation, and possible estrogen deprivation following aromatase inhibitor therapy for breast cancer with estrogen receptor positivity. Additionally, polypharmacy, including the use of Wellbutrin, Xanax, and Trazodone, may be contributing factors. We recommend a referral to sleep medicine for further  evaluation, as this may be a confounding factor in the patient's cognitive disorder and may respond well if we can discontinue Trazodone. A referral to psychiatry is advised to determine whether there might be a better combination of medications than venlafaxine, Wellbutrin, and Xanax for the patient's anxiety disorder. We also recommend a referral to an OB/GYN for a second opinion regarding the possibility of estrogen therapy, given the patient's continued reports of perimenopausal-related cognitive issues in her 70s. After a thorough discussion, the patient agrees with the proposed plan. She reports bothersome anxiety that seems unresponsive to multiple medications, as well as insomnia that is refractory to various treatments.    Medication Evaluation: A comprehensive review and reconciliation of the patient's current medications were performed. Medications were assessed for potential cognitive effects, interactions, and appropriateness. No medication concerns were identified at this time, and no adjustments were deemed necessary.  Safety Evaluation: The patient's safety was evaluated, including an assessment of the home environment, risk of falls, and other potential hazards. Motor vehicle operation was also assessed, and no safety concerns were identified at this time. No immediate recommendations or modifications were deemed necessary.  Caregiver Evaluation: The patient's primary caregiver was identified, and their knowledge, needs, and ability to provide care were evaluated. The caregiver denies needing assistance at this time but was provided with education and support resources for future reference, including requisite referrals as needed.  Advanced Care: The patient is currently documented as Full Code, meaning they have expressed a preference for all possible life-sustaining treatments to be administered in the event of a medical emergency, such as cardiopulmonary resuscitation (CPR), intubation, and  mechanical ventilation. Further discussion regarding advance care planning was not conducted at this time.          Relevant History of Baseline Neurocognitive Phenotype:    Developmental Milestones: The patient/family report no known birth complications or early life problems. The patient met all developmental milestones.  Learning Neurodivergence: The patient/family report no signs or symptoms suggestive of developmental learning disorder.  Educational History: Honors classes throughout primary/secondary school BA. Education 4 year MA. Education 4-5 yr PhD. 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 She has served in capacities including teaching, human resources management, and administration.      Relevant Neurotrauma History:    History of Traumatic Brain Injury: No History of Traumatic Brain Injury or Concussions  History of Brain/Spine Surgery: No History of Iatrogenic Brain Injury or CNS surgery  History of Toxin Exposure/Substance Abuse: No History of Toxin Exposure or Clinically Relevant Substance Abuse  History of Malnutrition/Vitamin Deficiency: No History of Malnutrition or Clinically Relevant Vitamin Deficiency  History of Chronic Mood Disorder/Stress: Depression/Anxiety  History of Chronic Inflammatory State: The patient has a history of breast cancer and underwent bilateral mastectomies with TRAM flap reconstructions in November 2017, followed by breast reconstruction. A lumpectomy was performed on a 2.4 cm mass in the right breast, which revealed ductal carcinoma in situ (DCIS) that was estrogen receptor-positive (ER+ 80%) and progesterone receptor-positive (NY+ 60%), as well as a 7 mm infiltrating ductal carcinoma (ER+ 80%, NY+ 50%). The patient  began treatment with letrozole on January 22, 2018. There is a history of mitral valve prolapse (MVP), but it has been assessed by cardiology in the past and determined to no longer be an issue. The last echocardiogram was conducted many years ago.      Relevant Family History:    Relevant Neurocognitive Disorder History:  Sister (5 years younger) - EOAD onset 58  Sister (5 years older) - developing cognitive impairment  Relevant Movement Disorder History:  The family denies a history of movement disorder (PD, PDD, tremor, etc).  Relevant Motor Neuron Disease History:  The family denies a history of motor neuron disease (ALS).  Relevant Developmental/Neurodivergent History:  The family denies a history of developmental learning disorder (Dyslexia, ADHD, ASD, etc.).  Relevant Psychiatric History:  The family denies a history of mood/substance abuse disorder (MDD, HOMA, Schizophrenia, etc.).  Known Genetic Profile: NEGATIVE - Sequence analysis and deletion/duplication testing of the 57 genes listed in the Genes Analyzed section. [employer] Hereditary Parkinson Disease and Parkinsonism Panel [employer] Hereditary Amyotrophic Lateral Sclerosis, Frontotemporal Dementia and Alzheimer Disease Panel            Clinical History Per Electronic Medical Record:    Past Medical History  Breast cancer  Depression  Mitral valve prolapse  Major depression, chronic  Anxiety  Prediabetes  Malignant neoplasm of right breast, stage 1, estrogen receptor positive  Otitis externa  Aortic atherosclerosis  Osteopenia of multiple sites  Primary hypertension  Mixed hyperlipidemia  History of breast cancer  Past Surgical History  Bilateral Mastectomy  Breast Lumpectomy  Breast Reconstruction  Cholecystectomy  Dilation And Curettage Of Uterus  Eye Surgery  Hernia Repair  Liver Lobectomy  Reduction Of Both Breasts  Tonsillectomy  Tubal Ligation  Umbilical Hernia Repair  Current Medication(s) Prior to Appointment  Acetaminophen 650 mg  Alprazolam 0.25  mg  B-complex plus Vit C, Calcium 300 mg-150 mg  Bupropion 75 mg  Cholecalciferol 125 mcg  Losartan 25 mg  Metformin 500 mg  Rosuvastatin 20 mg  Trazodone 100 mg  Venlafaxine 75 mg            Review of cognitive, visuospatial, motor, sensory, and behavioral systems:     Memory  The patient's memory has worsened relative to their baseline.  The patient does not repeat statements or asks the same question repeatedly.  The patient does not have difficulty remembering recent important conversations.  The patient does not forget information within minutes.  the patient denies new difficulty with recall events with high fidelity/accuracy.  The patient does not have difficulty remembering recent events.  The patient's remote memory is intact.  Attention  The patient's attention and concentration are impaired.  The patient does have attentional fluctuations.  The patient does have difficulty with selective attention.  The patient does not become easily distracted.  The patient does have difficulty with divided attention.  Executive  The patient's cognitive ability to process and respond to information has not slowed.  The patient's cognitive ability to process and respond to information has not slowed significantly .  The patient's cognitive ability to manage time is not effected.  The patient's cognitive ability to manage time is not severely effected.  The patient does have difficulty with working memory such as occasional reliance on external aids like notes.  The patient does misplace personal items (e.g., keys, cell phone, wallet) more frequently.  The patient does not have a significant degree of working memory impairment.  The patient does have difficulty with Goal-Directed Behavior.  The patient does not have difficulty keeping track of their medications.  The patient is not having major difficulty keeping track of medications independently.  The patient does not have difficulty with planning/organizing/completing  multistep tasks.  The patient does have not difficulty with multitasking/multistep tasks.  The patient does have difficulty with recognizing and adjusting for mistakes showing reoccurring inability to recognize or adjust for errors.  The patient has not shown to have difficulty with judgement.  The patient does not have difficulty understanding abstract concepts or relationship.  The patient's insight into their health and situation is intact.  Language  The patient's speech has been not affected.  The patient's speech is fluent and non-effortful.  The patient does not forget places/people's names more frequently.  The patient does have word-finding difficulties.  The patient does not make word substitutions.  The patient's speech is grammatically intact.  The patient does not appear to have impaired comprehension.  The patient does not appear to have difficulty comprehending and understanding written text.  Visuospatial  The patient has become confused or disoriented in new, unfamiliar places.  The patient does not have trouble with navigation.  The patient does not get lost in familiar places.  The patient does not have visuospatial disorientation.  The patient denies problems with driving or parking.  The patient does not have difficulty recognizing objects or faces.  Motor/Coordination  The patient does not have difficulty with walking.  The patient does not feel imbalanced.  The patient denies having fallen.  The patient does not appear to have new motor deficits.  The patient does not have difficulty buttoning shirts, operating zippers, or manipulating tools/utensils.  The patient denies new slow, small dysrhythmic movement.  The patient does not have a resting tremor.  The patient's handwriting has not become micrographic.  The patient denies restlessness.  The patient denies new and/or worsening simple repetitive behaviors.  The patient denies a change of self-stimulating behavior.  The patient's speech has  not become simplified or become repetitive/stereotyped.  The patient denies having any new involuntary movements and/or muscle jerking.  The patient denies new muscle cramps and twitching.  The patient does not have swallowing difficulty.  Sensory  The patient denies new numbness, tingling, paresthesias, or pain.  The patient denies a loss of vision, blurry vision, or double vision.  The patient denies new loss of hearing or worsening tinnitus.  The patient denies anosmia.  Sleep  The patient reports difficulty sleeping.  The patient does have difficulty going to sleep.  The patient reports difficulty staying asleep and/or frequently awakening at night.  The patient does not snore or have witnessed apneas while sleeping.  When the patient wakes up in the morning, the patient does feel well-rested.  The patient denies dream-enactment behavior.  The patient denies symptoms suggestive of restless leg syndrome.  Neurobehavioral  The patient's personality has not changed.  The patient does note have difficulty with self-control.  The patient denies new impulsivity or rash/careless actions.  The patient does not have difficulty with Decision-Making  The patient does not appear to have a change in inertia.  The patient does not appear apathetic or has decreased motivation.  The patient is not exhibiting a diminished response to other people's needs and feelings.  The patient is exhibiting symptoms of social withdrawal/indifference.  The patient is not exhibiting a diminished social interest, interrelatedness, or personal warmth.  The patient does not have symptoms to suggest a loss of manners or decorum.  The patient does not have symptoms of disinhibition and social inappropriateness.  The patient's personal hygiene is intact.  The patient does not have symptoms of hyper-religiosity or dogmatism.  The patient's interests/pleasures have not become restrictive, simplified, interrupting, or repetitive.  The patient denies  new/worsening complex repetitive/ritualistic compulsions and behaviors.  The patient denies any changes in eating behavior.  The patient denies increased consumption of food or substances.  The patient denies oral exploration or consumption of inedible objects.  Psychiatric  The patient does have difficulty with managing emotional responses effectively.  The patient does have symptoms of irritability and mood lability.  The patient does not exhibit hyperactive behavior.  The patient does not have symptoms of agitation, aggression, or violent outbursts.  The patient's emotional expression has changed.  The patient does have emotional blunting.  The patient does feel depressed.  The patient does have anxiety.  The patient does not exhibit cycling behavior.  The patient is not exhibited symptoms of paranoia.  The patient does not have delusions.  The patient does not have hallucinations.  Medical Review of Systems  The patient does not have constipation.  The patient does not have urinary incontinence.  The patient denies orthostatic lightheadedness.  The patient's weight is stable.  The patient does not have a history of sensitivity to neuroleptic/psychotropic medications.            Neurological Examination:     Mental Status  The patient's appearance is normal (hygiene is appropriate; attire is proper and clean).  Throughout the interview, the patient is cooperative, the patient's eye contact is appropriate.  The patient behavior is appropriate to the clinical context without impropriety or improper language/conduct.  The patient's energy level is abnormal.  The patient has appropriate attention/concentration (ELSIE/CAMILLE forwards and backward).  The patient can complete three-step commands.  The patient's thought process is not logical or goal-oriented.  The patient demonstrated appropriate insight based on actions, awareness of the patient's illness, plans for the future.  The patient demonstrated good judgment based  "on actions and plans for the future.  Cranial Nerves  The patient showed no evidence of anosmia 3/3 (coffee/vanilla/cinnamon).  The patient's pupils were normal.  The patient's visual fields were full to confrontation in all quadrants.  The patient's ocular pursuit in the horizontal and vertical plane was complete.  The patient's saccadic initiation, velocity, and amplitude are normal.  The patient demonstrated no square-wave jerks.  The patient's eyelid assessment showed no apraxia. There was no eyelid dysfunction, retraction, or baez sign.  The patient blink rate was normal.  The patient's facial strength was normal.  The patient's facial expression was symmetric and appropriate to the context.  The patient's facial sensation was intact to light touch bilaterally.  The patient's hearing was normal bilaterally.  The patient's oropharynx was non-obstructed with a Mallapati score 1/4. The soft palate elevates symmetrically.  The patient's uvula is mid-line.  The patient's tongue showed no evidence of scalloping.  The patient can protrude their tongue beyond The patient's lips for >10 sec.  The patient can move their extended tongue back and forth rapidly.  The patient's tongue showed no evidence of fasciculation or scalloping.  The patient's sternocleidomastoid and trapezius muscle strength was full bilaterally.  The patient had no significant evidence of anterocollis or retrocollis.  Speech/Language  The patient's speech was fluent, non-effortful, and the patient's rate was appropriate to the context.  The patient's speech volume is within normal range and appropriate to the context.  The patient's speech rate is normal.  The patient's respirations are within normal range and appropriate to context.  The patient's speech timbre is normal.  The patient has no articulation (segmental features) errors.  The patient has no speech dysdiadochokinesia with repetition of syllables such as "/PA/, /TA/, /KA/, /OM/".  The " "patient's pitch assessment showed normal range, intonation (Pitch Pattern), and variability.  The patient's tone was not emotionally limited, and tempo was rhythmic (e.g., "Once upon a midnight dreary, while I pondered, weak and weary, Over many a quaint and curious volume of forgotten caryl" and "That government of the people, by the people, for the people, shall not perish from the earth").  The patient's speech is not dysarthric.  The patient's speech was without evidence of anomia.  The patient makes no phonological loop errors.  The patient's speech is grammatically intact; (no function/semantic word substitutions, phonemic/semantic paraphasias, or binary confusion).  Motor  The patient's bilateral upper extremity muscle bulk is appropriate.  The patient's bilateral upper extremity muscle tone is not increased.  There was no dystonia observed on examination.  Assessment of motor strength was symmetric and at minimal anti-gravity.  Deltoid L +5/5 R +5/5 Biceps L +5/5 R +5/5 Triceps L +5/5 R +5/5 Wrist extension L +5/5 R +5/5 Finger abduction L +5/5 R +5/5 Hip flexion L +5/5 R +5/5 Hip extension L +5/5 R +5/5 Knee flexion L +5/5 R +5/5 Knee extension L +5/5 R +5/5 Ankle flexion L +5/5 R +5/5 Ankle extension L +5/5 R +5/5  There is no pronator or downward drift.  There is no myoclonus observed in the patient bilateral upper and lower extremities.  There are no fasciculations observed in the patient bilateral upper and lower extremities.  Coordination  The patient has no bilateral upper extremity limb dysmetria or past pointing on finger-nose-finger bilaterally.  The patient has no limb dysdiadochokinesia of the upper extremity on the pronation/supination test and screwing in a light bulb or lower extremity during tapping ball of each foot bilaterally.  The patient has no cerebellar rebound bilaterally.  The patient has a visible tremor.  The patient has no kinetic tremor bilaterally.  The patient has a postural " tremor.  The patient has no resting tremor bilaterally.  The patient has no evidence of interhemispheric motor control deficits.  The patient has no motor overflow bilaterally.  The patient demonstrates no alien limb phenomena.  The patient has no dyskinetic movements.  The patient has no akathisia.  The patient's bilateral upper extremity coordination with finger tapping, pronation/supination, and the open-close fist showed no slowing, no hypometria, and no dysrhythmia inconsistent with bradykinesia.  Higher Cortical Function  The patient had no hemineglect (e.g., line bisection/Larry's test).  The patient showed no evidence of simultanagnosia (Navon hierarchical letters).  The patient showed no evidence of visuospatial constructional dysfunction.  The patient showed no evidence of angular gyrus disconnection (insular-operculum).  The patient has no evidence of dysgraphia.  The patient showed no evidence of apraxia.  The patient showed no dysexecutive behavior.  Sensory  The patient's cortical sensory assessment demonstrated no neglect bilaterally.  The patient's sensation was diminished to light touch, and vibratory sense.  The patient's sensation was intact to temperature/pinprick in the bilateral upper and lower extremities.  The patient's sensation was intact to joint position sense in the bilateral upper and lower extremities.  Reflexes  Reflexes were symmetric and 2+ at biceps, 2+ triceps, and 2+ brachioradialis, 2+ at the knees bilaterally, there was no cross-abductor sign, 2+ in the bilateral ankles.  There were no pathological reflexes; No Babinski, bilateral plantar toes go down, no Jaw Jerk Reflex, No Quezada, No Palmomental reflex, and No Palmar Grasp reflex.  There was no spreading/clonus.  Gait  The patient has normal posture sitting unaided.  The patient is unable to rise from a chair and sit back down without using their arms.  The patient's gait was normal.  The patient's posture while walking is  normal.  The patient's stance while walking is normal.  The patient's gait speed was abnormal (70-80 F 1.13 m/s M 1.26 m/s, >80 F 0.94 m/sec, M 0.97 m/sec).  The patient's stride including step-time, step-width, and step-length was normal.  The patient's arms swing is symmetric and of normal amplitude.  The patient takes turns in <4 steps.  When attempting to walk abnormally (heels, tiptoes, tandem), the patient makes no errors.  The patient has no evidence of posture/balance impairment.            Functional Capacity Assessment: Neurological Wellbeing, Intelligence, and Social Evaluation:     Instrumental Activities of Daily Living:   Communal Operations: Borderline level of inability to perform independantly.  Finances: Normal level of functional independance.  Housework: Normal level of functional independance.  Personal Health: Normal level of functional independance.  Recreation: Borderline level of inability to perform independantly.  Basic Activities of Daily Living:   Axial Motor: Normal level of functional independance.  Grooming: Normal level of functional independance.  Hygeine: Normal level of functional independance.  Oropharngeal Motor: Normal level of functional independance.  Personal Health: Normal level of functional independance.  Toiletry: Normal level of functional independance.  Functional Capacity Scales:   IADL: Score 0/8 suggestive of Normal.  FAST: Score 0/16  FAS: Score 1/30 suggestive of Normal.  CDR-SOB: Score 0.5/18 suggestive of Normal.  Global CDR: Score 0.5/3 suggestive of Questionable cognitive impairment.  Memory: 0.5  Orientation: 0  Judgment & Problem Solvin  Community Affairs: 0  Home and Hobbies: 0  Personal Care: 0            Neurocognitive Assessment:     Question Score Interpretation   Memory   Registration T1 (3) 3/3 Within Normal Limits.   Registration T1 (5) 5 Within Normal Limits.   Registration T1 (9) 5/9 Within Normal Limits.   Delayed Recall 30 sec (9) 8/9 Within  Normal Limits.   Delayed Recall 10 min (3) 3/3 Within Normal Limits.   Delayed Recall 10 min (5) 5/5 Within Normal Limits.   Delayed Recall 10 min (9) 7/9 Within Normal Limits.   Delayed Recall Cued (9) 7/9 Within Normal Limits.   Executive   Three-step command 3/3 Within Normal Limits.   Serial Sevens 2/3 Borderline Impairment based on age and education.   WORLD Backward 5/5 Within Normal Limits.   Digit Span - 2 2/2 Within Normal Limits.   Fluency 1/1 Within Normal Limits.   Trials-1 1/1 Within Normal Limits.   Visuospatial   3D Cube Copy 1/1 Within Normal Limits.   Clock Draw 1/3 Mild Impairment based on age and education.   Complex Figure Copy 17/17 Within Normal Limits.   Overlap Images Total 10/10 Within Normal Limits.   Picture Synthesis 3/3 Within Normal Limits.   Pareidolia Total 20/20 Within Normal Limits.   Language   Naming-2 2/2 Within Normal Limits.   Naming-3 3/3 Within Normal Limits.   15-Item BNT 15/15 Within Normal Limits.   Repetition-1 1/1 Within Normal Limits.   Repetition-2 2/2 Within Normal Limits.   Repetition of Phrases 5/5 Within Normal Limits.   Verbal Agility 6/6 Within Normal Limits.   Following written command 1/1 Within Normal Limits.   Writing a complete sentence 1/1 Within Normal Limits.   Abstraction 2/2 Within Normal Limits.   Attention   Orientation-10 9/10 Borderline Impairment based on age and education.   Orientation-6 6/6 Within Normal Limits.   Alternating Sequence 1/1 Within Normal Limits.   Social   FTLD Questionnaire  20/20 Within Normal Limits.   Theory of Mind Cartoon 1/1 Within Normal Limits.   Motor/Sensory   Limb-Kinetic Apraxia 10/10 Within Normal Limits.     Clinical Impression of Neurocognitive Assessment: Executive predominant multidomain mild cognitive impairment.          Laboratories:     Hematology Screening - Serum  Name Reference Previous Values   WBC 3.90 - 12.70 K/uL 7.96   2023-01-26   5.63   2024-02-23   6.54   2024-08-05   6.38   2025-03-07   8.50    2025-04-07      RBC 4.00 - 5.40 M/uL 4.60   2023-01-26   4.55   2024-02-23   4.69   2024-08-05   5.07   2025-03-07   4.66   2025-04-07      Hemoglobin 12.0 - 16.0 g/dL 13.5   2023-01-26   13.3   2024-02-23   14.0   2024-08-05   14.7   2025-03-07   13.7   2025-04-07      Hematocrit 37.0 - 48.5 % 43.5   2023-01-26   42.8   2024-02-23   45.0   2024-08-05   48.0   2025-03-07   43.1   2025-04-07      MCV 82 - 98 fL 95   2023-01-26   94   2024-02-23   96   2024-08-05   95   2025-03-07   93   2025-04-07      MCH 27.0 - 31.0 pg 29.3   2023-01-26   29.2   2024-02-23   29.9   2024-08-05   29.0   2025-03-07   29.4   2025-04-07      MCHC 32.0 - 36.0 g/dL 31.0 (L)   2023-01-26   31.1 (L)   2024-02-23   31.1 (L)   2024-08-05   30.6 (L)   2025-03-07   31.8 (L)   2025-04-07      RDW 11.5 - 14.5 % 14.2   2023-01-26   13.4   2024-02-23   13.2   2024-08-05   12.9   2025-03-07   12.8   2025-04-07      Platelet Count 150 - 450 K/uL 263   2023-01-26   239   2024-02-23   266   2024-08-05   257   2025-03-07   268   2025-04-07      MPV 9.2 - 12.9 fL 10.1   2023-01-26   9.8   2024-02-23   10.5   2024-08-05   9.8   2025-03-07   9.6   2025-04-07      Neuroendocrine/Electrolyte Screening  Name Reference Previous Values   Sodium 136 - 145 mmol/L 143   2023-01-26   139   2023-08-01   141   2023-12-07   142   2024-02-23   140   2025-03-07   138   2025-04-07      Potassium 3.5 - 5.1 mmol/L 4.4   2023-01-26   4.1   2023-08-01   4.3   2023-12-07   3.8   2024-02-23   4.3   2025-03-07   3.5   2025-04-07      Chloride 95 - 110 mmol/L 108   2023-01-26   104   2023-08-01   108   2023-12-07   106   2024-02-23   108   2025-03-07   102   2025-04-07      CO2 23 - 29 mmol/L 23   2023-01-26   23   2023-08-01   23   2023-12-07   27   2024-02-23   22 (L)   2025-03-07   27   2025-04-07      Creatinine 0.5 - 1.4 mg/dL 0.8   2023-01-26   0.8   2023-08-01   0.8   2023-12-07   0.8   2024-02-23    0.8   2024-08-19   0.8   2025-03-07   0.8   2025-04-07      Calcium 8.7 - 10.5 mg/dL 10.0   2023-01-26   9.8   2023-08-01   9.9   2023-12-07   9.7   2024-02-23   9.7   2025-03-07   9.4   2025-04-07      Anion Gap 8 - 16 mmol/L 12   2023-01-26   12   2023-08-01   10   2023-12-07   9   2024-02-23   10   2025-03-07   9   2025-04-07      TSH 0.400 - 4.000 uIU/mL 0.626   2023-01-26   0.222 (L)   2025-03-07   0.556   2025-04-07      Free T4 0.71 - 1.51 ng/dL 1.10   2025-03-07   1.01   2025-04-07      Magnesium 1.6 - 2.6 mg/dL 1.8   2025-04-07      Metabolic Screening  Name Reference Previous Values   Glucose 70 - 110 mg/dL 95   2023-01-26   106   2023-08-01   102   2023-12-07   138 (H)   2024-02-23   92   2025-03-07   118 (H)   2025-04-07      BUN 8 - 23 mg/dL 16   2023-01-26   14   2023-08-01   18   2023-12-07   13   2024-02-23   14   2025-03-07   17   2025-04-07      Albumin 3.5 - 5.2 g/dL 4.4   2023-01-26   4.5   2023-08-01   4.2   2023-12-07   3.9   2024-02-23   4.1   2025-03-07   4.3   2025-04-07      Hemoglobin A1C External 4.0 - 5.6 % 5.9 (H)   2023-08-01   5.8 (H)   2023-12-07   5.8 (H)   2025-03-07   5.6   2025-04-07      Estimated Avg Glucose 68 - 131 mg/dL 123   2023-08-01   120   2023-12-07   120   2025-03-07   114   2025-04-07      Vitamin B1 38 - 122 ug/L 142 (H)   2025-04-07      Cholesterol Total 120 - 199 mg/dL 163   2023-08-01   149   2025-03-07      Triglycerides 30 - 150 mg/dL 125   2023-08-01   66   2025-03-07      HDL 40 - 75 mg/dL 68   2023-08-01   70   2025-03-07      LDL Cholesterol 63.0 - 159.0 mg/dL 70.0   2023-08-01   65.8   2025-03-07      HDL/Cholesterol Ratio 20.0 - 50.0 % 41.7   2023-08-01   47.0   2025-03-07      Total Cholesterol/HDL Ratio 2.0 - 5.0 2.4   2023-08-01   2.1   2025-03-07      Non-HDL Cholesterol mg/dL 95   2023-08-01   79   2025-03-07      Lipase 4 - 60 U/L 28   2023-01-26      Neuro-Nutritional Screening  Name  Reference Previous Values   PROTEIN TOTAL 6.0 - 8.4 g/dL 7.2   2023-01-26   7.6   2023-08-01   7.0   2023-12-07   6.8   2024-02-23   7.0   2025-03-07   7.1   2025-04-07      Folate 4.0 - 24.0 ng/mL 16.0   2025-04-07      Methylmalonic Acid 69 - 390 nmol/L 0.17   2025-04-07      Vitamin B12 Assay, S 180 - 914 ng/L 651   2025-04-07      Vitamin B12 180 - 914 ng/L 288   2024-02-23      B12 Def. Methylmalonic Acid <=0.40 nmol/mL 0.13   2024-02-23      Liver Function Screening  Name Reference Previous Values   BILIRUBIN TOTAL 0.1 - 1.0 mg/dL 1.0   2023-01-26   1.2 (H)   2023-08-01   0.9   2023-12-07   0.6   2024-02-23   0.8   2025-03-07   0.8   2025-04-07      ALP 55 - 135 U/L 55   2023-01-26   58   2023-08-01   50   2023-12-07   46   2024-02-23   41   2025-03-07   41   2025-04-07      AST 10 - 40 U/L 19   2023-01-26   22   2023-08-01   18   2023-12-07   19   2024-02-23   64 (H)   2025-03-07   20   2025-04-07      ALT 10 - 44 U/L 19   2023-01-26   27   2023-08-01   14   2023-12-07   23   2024-02-23   26   2025-03-07   27   2025-04-07      Autoimmune/Paraneoplastic Screening  Name Reference Previous Values   PNEOE, CRMP-5-IgG, Csf 1   2022-06-15   1   2023-01-20   1   2023-01-26   1   2023-01-30   1   2023-06-08   1   2023-08-01   1   2023-09-14   1   2023-10-13   1   2023-12-07   1   2024-02-23   1   2024-08-05   1   2024-08-19   1   2024-10-17   1   2025-03-07   1   2025-04-07      PNEOE YUSRA-1, Csf Negative Negative   2022-06-15      PNEOE YUSRA-2, Csf Negative Negative   2022-06-15      PNEOE YUSRA-3, Csf Negative Negative   2022-06-15      PNEOE AGNA-1, Csf Negative Negative   2022-06-15      PNEOE, PCA-2, Csf Negative Negative   2022-06-15      PNEOE, PCA-Tr, Csf Negative Negative   2022-06-15      PNEOE, Amphiphysin Ab, Csf Negative Negative   2022-06-15      Neurodegenerative Biomarkers - Serum  Name Reference Previous Values   M Phospho-Tau 217 <=0.185pg/mL  0.165   2025-04-07      Neurofilament Light Chain, Plasma <=37.9 pg/mL 9.5   2024-02-23   16.4   2025-04-07      Phospho-Tau (181P) <0.97 pg/mL 0.67   2024-02-23      Neuroinfectious Screening  Name Reference Previous Values   Treponema Pallidum Antibodies (IgG, IgM) Nonreactive    2025-04-07      POC Molecular Influenza A Ag Negative, Not Reported Negative   2024-10-17      POC Molecular Influenza B Ag Negative, Not Reported Negative   2024-10-17      SARS-CoV-2 RNA, Amplification, Qual Negative Positive (P)   2024-10-17      Hepatitis C Ab Negative    2023-08-01      Autoimmune Screening - Serum  Name Reference Previous Values   IgG 650 - 1600 mg/dL 657   2024-02-23      Neuro-Infectious Screening  Name Reference Previous Values   SARS Coronavirus 2 Antigen Negative Positive (P)   2023-09-14      Standard Screening - CSF  Name Reference Previous Values   Wbc, Cell Count CSF 0 - 5 /cu mm 1   2022-06-15      RBC, Cell Count CSF 0 /cu mm 239   2022-06-15      Protein, CSF 15 - 40 mg/dL 52 (H)   2022-06-15      Glucose CSF 40 - 70 mg/dL 57   2022-06-15      Neuroinfectious Screening - CSF  Name Reference Previous Values   VDRL, CSF Negative Negative   2022-06-15      Neurodegenerative Biomarkers - CSF  Name Reference Previous Values   Neuron Specific Enolase, CSF ng/mL 19 (H)   2022-06-15      p-Tau/Abeta42 <=0.028 ratio 0.015   2022-06-15      Total Tau <=238 pg/mL 169   2022-06-15      Phospho-Tau (181P) <=21.6 pg/mL 13.0   2022-06-15                Neurological Relevant Procedures:    performed on 04/22/2025  Formal Interpretation:  Provider Interpretation:  Electrocardiogram performed on 12/16/2021  Formal Interpretation: Vent. Rate : 067 BPM     Atrial Rate : 067 BPM    P-R Int : 148 ms          QRS Dur : 084 ms     QT Int : 398 ms       P-R-T Axes : 061 043 060 degrees    QTc Int : 420 ms Normal sinus rhythm Possible Left atrial enlargement Nonspecific ST and/or T wave abnormalities Abnormal  ECG  Provider Interpretation: Received ECG has no evidence of sinus node disease. HR (>=50-60). Prolonged ND interval (>0.22 s). Broad QRS complex (> 0.12 s).            Neurologically Relevant Imaging:    MRI brain/head without contrast performed on 04/10/2025  Radiologist Interpretation: Unremarkable noncontrast MRI brain as detailed above specifically without evidence for acute infarction or hydrocephalus.  Provider Interpretation: The findings remain unchanged compared to the 2021 MRI of the brain. The imaging of the brain appears normal. There is no significant cortical or subcortical atrophy observed. Furthermore, there are no notable T2/FLAIR hyperintensities or abnormalities detected on the DWI, ADC, or SWI sequences.  T1-weighted (T1W) Image Summary: The radiographic interpretation indicates no significant cortical atrophy, with changes consistent with the patient's age. Additionally, the subcortical nuclei and hippocampi do not exhibit significant atrophy.  FLAIR/T2-weighted (T2W) Image Summary: No significant hyperintensities were observed on the MRI T2/FLAIR sequences.  Diffusion Weighted Imaging with ADC Mapping Summary: There are no significant hyperintensities or hypointensities observed on Diffusion-Weighted Imaging (DWI). Additionally, there is no apparent correlation with the Apparent Diffusion Coefficient (ADC).  Susceptibility-weighted imaging (SWI) and/or GRE Summary: There are no significant hypointensities to suggest cortical or subcortical hemosiderin deposition.  MRI brain/head without contrast performed on 10/04/2021  Technique of Brain Imaging: Multiplanar multisequence MR imaging of the brain was performed without intravenous contrast.  Radiologist Interpretation: No evidence of acute or chronic intracranial pathology.  Provider Interpretation: Normal Brain Imaging.  T1-weighted (T1W) Image Summary: No significant cortical atrophy with age-appropriate changes. Subcortical nuclei and  hippocampi are without significant atrophy.  FLAIR/T2-weighted (T2W) Image Summary: No Significant hyperintensities appreciated on MRI T2/FLAIR  Diffusion Weighted Imaging with ADC Mapping Summary: No Significant DWI hyperintensities/hypointensities. No ADC correlation.  Susceptibility-weighted imaging (SWI) and/or GRE Summary: No Significant hypointensities to suggest cortical/subcortical hemosiderin deposition.            Clinical Summary/Interpretation:    Neurobehavioral/Neuropsychiatric Evaluation Interpretation: The review of systems indicates neurocognitive deficits primarily affecting memory, attention, and mood regulation, which can be linked to dysfunctions in specific brain regions. The observed mild impairments in general and working memory, attention, and word-finding difficulties suggest potential disruptions in the prefrontal cortex and its connections with the hippocampus and temporal lobes. Mood instability, anxiety, and emotional lability point towards dysregulation within the limbic system, particularly involving the amygdala and anterior cingulate cortex. Insomnia and social withdrawal may further indicate alterations in the default mode network and its interaction with subcortical structures, reflecting a complex network pathology impacting both cognitive and emotional domains.  BEHAV5+: Score 2/5 indicates significant behavioral symptoms which align with observed neuropsychiatric findings.  Neurocognitive/Cognition-Focused Evaluation Interpretation: Subjective Cognitive Impairment. No discernible cognitive impairment based on age and education normative values.  Mild Executive Impairment: The patient scored >0.5 standard deviation below the norm on at least one measure. Impairment appreciated in working memory  Normal Attention Function  Normal Visuospatial Function  Normal Memory Function  Normal Language Function  Normal Social Function  Normal Motor/Sensory Function  Assessment completed in  April 2025  MMSE 28/30: Score suggestive of normal cognitive function to borderline cognitive impairment.  MOCA 27/30: Score suggestive of normal cognitive function to borderline cognitive impairment.  Neurological Examination Interpretation: In the neurological examination, abnormalities were observed in areas related to arousal, thought disorder, A-delta fibers, postural control, difficulty rising, and speed of movement. These dysfunctions suggest potential issues in both cortical and subcortical regions. Arousal and thought disorder may be linked to dysfunctions in the frontal cortex and its networks, which are critical for attention and executive function. The involvement of A-delta fibers points to possible sensory pathway disruptions, affecting pain and temperature sensation, while postural control and difficulty rising could indicate cerebellar or basal ganglia dysfunction, impacting balance and motor coordination. Overall, these findings suggest a complex network pathology affecting multiple regions involved in sensory processing, motor coordination, and cognitive functions.  Neurological Imaging Summary:  MRI brain/head without contrast performed on 04/10/2025  Provider Interpretation: The findings remain unchanged compared to the 2021 MRI of the brain. The imaging of the brain appears normal. There is no significant cortical or subcortical atrophy observed. Furthermore, there are no notable T2/FLAIR hyperintensities or abnormalities detected on the DWI, ADC, or SWI sequences.  Radiologist Interpretation: Unremarkable noncontrast MRI brain as detailed above specifically without evidence for acute infarction or hydrocephalus.  MRI brain/head without contrast performed on 10/04/2021  Provider Interpretation: Normal Brain Imaging.  Radiologist Interpretation: No evidence of acute or chronic intracranial pathology.    Billing Statement:       I performed this consultation using real-time Telehealth tools,  including a live video connection between my location and the patient's location (their home within the [facility]). Prior to initiating the consultation, I obtained informed verbal consent to perform this consultation using Telehealth tools and answered all the questions about the Telehealth interaction. The participants understood that only a limited neurological exam and limited neuropsychological testing could be performed using Telehealth tools.     I spent a total of 45 minutes (from 11:45 AM to 12:30 PM) on 04/21/2025, engaging in person in a face-to-face consultation with the patient. More than 50% of this time was devoted to counseling on symptoms, treatment plans, risks, therapeutic options, lifestyle modifications, and safety concerns related to the above diagnoses.     A Review of Systems was completed, encompassing 10 of the 14 systems. All findings were negative, except those noted in the History of Present Illness (HPI) and Review of Systems (ROS) Sections. The systems reviewed were Constitutional (Const), Eyes, Ear/Nose/Throat (ENT), Respiratory (Resp), Cardiovascular (CV), Gastrointestinal (GI), Genitourinary (), Musculoskeletal (MSK), Skin, and Neurological (Neuro).     I spent a total of 5 minutes reviewing and interpreting radiology imaging on the day of the clinical evaluation. This review was directly related to the face-to-face encounter. The findings from the radiology tests were predominantly within normal limits, with exceptions as detailed in the History of Present Illness (HPI) and Assessment.      I spent a total of 15 minutes to reviewing previous laboratory results on the day of the clinical evaluation. This review was an integral part of the face-to-face encounter. The laboratory findings were predominantly negative, with exceptions as noted in the History of Present Illness (HPI) and Assessment.     I spent a total of 15 minutes reviewing and summarizing records from  outside physicians on the day of the clinical evaluation. This review and summary were conducted as described in the History of Present Illness (HPI) and Assessment.     I performed a neurobehavioral status examination on the day of clinical evaluation that included a clinical assessment of thinking, reasoning, and judgment to ensure a comprehensive approach in managing the complex and evolving needs of the patient's neurocognitive condition. Please see above HPI and ROS for full details. This exam was performed on the day of clinical evaluation and included 18 minutes spent on direct face-to-face clinical observation and interview with the patient and 18 minutes spent interpreting test results and preparing the report. The total time of 36 minutes spent on the neurobehavioral status examination is not included in the time spent on evaluation and management coding.     Total Billing time spent on encounter/documentation for this patient's evaluation and management, not including the Neurobehavioral Status Examination: 62 minutes.           This note was dictated using the M*Modal Fluency Direct voice recognition program. Please be aware that word recognition errors may occasionally occur and might be overlooked during review. [id number]        Signing Physician:  Suraj Obrien MD

## 2025-04-30 ENCOUNTER — PATIENT MESSAGE (OUTPATIENT)
Dept: OBSTETRICS AND GYNECOLOGY | Facility: CLINIC | Age: 70
End: 2025-04-30
Payer: MEDICARE

## 2025-05-06 ENCOUNTER — OFFICE VISIT (OUTPATIENT)
Dept: SLEEP MEDICINE | Facility: CLINIC | Age: 70
End: 2025-05-06
Payer: COMMERCIAL

## 2025-05-06 ENCOUNTER — CLINICAL SUPPORT (OUTPATIENT)
Dept: OBSTETRICS AND GYNECOLOGY | Facility: CLINIC | Age: 70
End: 2025-05-06
Payer: COMMERCIAL

## 2025-05-06 DIAGNOSIS — N95.1 MENOPAUSAL SYMPTOMS: Primary | ICD-10-CM

## 2025-05-06 DIAGNOSIS — G47.30 SLEEP APNEA, UNSPECIFIED TYPE: Primary | ICD-10-CM

## 2025-05-06 DIAGNOSIS — G47.00 INSOMNIA, UNSPECIFIED TYPE: ICD-10-CM

## 2025-05-06 PROCEDURE — 98006 SYNCH AUDIO-VIDEO EST MOD 30: CPT | Mod: 95,,,

## 2025-05-06 PROCEDURE — 3044F HG A1C LEVEL LT 7.0%: CPT | Mod: CPTII,95,,

## 2025-05-06 PROCEDURE — 4010F ACE/ARB THERAPY RXD/TAKEN: CPT | Mod: CPTII,95,,

## 2025-05-06 NOTE — PATIENT INSTRUCTIONS
SLEEP LAB (Dinora or Iraj) will contact you to schedule the sleep study. Their number is [phone] (ext [phone]). Please call them if you do not hear from them in 2 weeks from now.  The [facility] is located on 7th floor of the [facility]; [facility] is located in [facility] ( 3rd floor [facility] Building).    SLEEP CLINIC (my assistant) will call you when the sleep study results are ready or I will message you through the portal with the results as we have discussed - if you have not heard from us by 2 weeks from the date of the study, or you can use My [facility] to contact me.    Our clinic phone number is [phone] (ext 1)       You are advised to abstain from driving should you feel sleepy or drowsy.

## 2025-05-06 NOTE — PROGRESS NOTES
The patient location is: [place]   The chief complaint leading to consultation is: Snoring and Insomnia        Visit type: audiovisual    Face to Face time with patient: 23  35 minutes of total time spent on the encounter, which includes face to face time and non-face to face time preparing to see the patient (eg, review of tests), Obtaining and/or reviewing separately obtained history, Documenting clinical information in the electronic or other health record, Independently interpreting results (not separately reported) and communicating results to the patient/family/caregiver, or Care coordination (not separately reported).         Each patient to whom he or she provides medical services by telemedicine is:  (1) informed of the relationship between the physician and patient and the respective role of any other health care provider with respect to management of the patient; and (2) notified that he or she may decline to receive medical services by telemedicine and may withdraw from such care at any time.    Notes:     CC: Snoring and Insomnia     Referred by Nehal Pardo NP for a sleep evaluation.     HPI     Subjective    HPI:  Sleep Apnea:  Symptoms:    [x] Loud snoring[x] Witnessed apneas [x] Gasping [x] Choking[x] Interrupted Sleep [x]  Nocturia [x]  Non refreshing sleep [x] Excessive daytime sleepiness   []  Morning headaches [] Nasal Congestion [x] Dry Mouth [] Excessive Daytime Fatigue [] None  Duration:   [] Days  [] Months  [x] Years  Comments: patient here to establish care and sleep evaluation. Reports snoring, witnessed apneas, interrupted sleep, nocturia, non refreshing sleep, excessive daytime sleepiness, and dry mouth.     Wake up Feeling: [] Refreshed  [x] Non refreshed [] Occasionally Tired  Do You Take Naps: [x] Yes [] No Number of Naps: sometimes dozes off reading on back porch     Insomnia:  Symptoms:    [x] Difficulty falling asleep [x] Difficulty Staying Asleep[x] Frequent nocturnal  awakenings[] Early morning awakenings  [] Denies problems with sleep  Duration:     [] Days  [] Months  [x] Years  Frequency:  [] Occasionally [] Times per week [x] Daily [] Weekly [] Rarely  Comments: patient reports difficulty falling asleep and difficutly staying asleep with frequent nocturnal awakenings.  Currently taking trazodone 100 mg nightly. Sleep latency with trazodone 1-3 hours. Tries to take at 8 pm to fall asleep for 11 pm. Would like to get 9 hours of sleep per night. Contributing factors to poor sleep quality include nocturia (2-3) and poor sleep hygiene: spends an excessive time in bed (11 pm - (9:30 - 10:30 am), does stimulating activities when can't sleep (plays bridge on phone or electronic device), bright light exposure, and caffeine close to bedtime (6 pm). Current treatment with trazodone does reduce sleep latency, patient has never tried sleep restriction or been exposed to cognitive behavioral therapy for insomnia.         5/2/2025     8:14 PM   EPWORTH SLEEPINESS SCALE TOTAL SCORE    Total score 6        Patient-reported     (Validated Sleepiness Questionnaire with higher score indicating greater sleepiness (0-24)    STOP BANG Questionnaire  Patient diagnosed with Obstructive Sleep Apnea?: No  Has loud snoring: Yes  Disturbed sleep, daytime fatigue, daytime somnolence: Yes  Observed to have interrupted breathing during sleep: Yes  Takes medication for high blood pressure: Yes  Not taking BP medication but supposed to be: No  Recent BMI (Calculated): 25.7  Is BMI greater than 35 kg/m2?: 0=No  Age older than 50 years old?: 1=Yes  Has large neck size >40cm (15.7in., large male shirt size, large male collar size >16): No  Gender - Male: 0=No  STOP-Bang Total Score: 5  (Validated Stop Bang Questionnaire with higher score indicating greater risk of CARISA (0-2, 3-4, 5-8)) Risk: Moderate        5/2/2025     8:33 PM   Sleep Clinic New Patient   What time do you go to bed on a week day? (Give a range)  Around 11pm, possibly later.   What time do you go to bed on a day off? (Give a range) I AM RETIRED ,  N/A   How long does it take you to fall asleep? (Give a range) MAYBE AN HOUR OR TWO, OR MORE., I just do not fall asleep without a sleep aid...I'll be awake at 3 am if I haven't taken something to enhance falling asleep; however, even with the aid, falling asleep can still take 2-3 hours. The nights that I take THC with an aid generally work best.   On average, how many times per night do you wake up? 2-3 times a night   How long does it take you to fall back into sleep? (Give a range) Not long., Maybe 15 minutes or so.   What time do you wake up to start your day on a week day? (Give a range) Around 9:30 to 10:30am   What time do you wake up to start your day on a day off? (Give a range) Same as above.   What time do you get out of bed? (Give a range) SAME 9:30-10:30   On average, how many hours do you sleep? This varies widely.  8-10 hours per night depending upon level of activity that day, or if I simply want to stay up with my  to watch a late movie. I play bridge at night for fun, watch a movie or read.  I read quite a bit.   On average, how many naps do you take per day? less than 1   Rate your sleep quality from 0 to 5 (0-poor, 5-great). 3   Have you experienced:  N/a   How much weight have you lost or gained (in lbs.) in the last year? No great gain or loss., I stay in the range of 155-165 lbs   On average, how many times per night do you go to the bathroom?  2, Maybe 3   Have you ever had a sleep study/CPAP machine/surgery for sleep apnea? No   Have you ever had a CPAP machine for sleep apnea? No   Have you ever had surgery for sleep apnea? No       Current Sleep Medication(s): Trazodone 100 mg  Alprazolam 0.25 mg 1-2 per week  THC: 1 tablet    Previous Sleep Medication(s): Ambien      Sleep Factors:  Sleep Environment: Cool, Dark, Comfortable, and Nightlight on while asleep  Caffeine: 3-4  beverages, last beverage at  6 pm  Bed Partner: spouse, living together  Alcohol: social drinker  Sleep Disorder Family History: Obstructive Sleep Apnea mother and sister  Sleep Problems: naps during the day  spends excessive time in bed  anxiety surrounding sleep        Objective    Records Reviewed:   Lab Results   Component Value Date    TSH 0.556 04/07/2025    CO2 27 04/07/2025    HGBA1C 5.6 04/07/2025      Echo  Result Date: 8/14/2024    Left Ventricle: The left ventricle is normal in size. Normal wall   thickness. There is normal systolic function with a visually estimated   ejection fraction of 60 - 65%. There is normal diastolic function.    Right Ventricle: Normal right ventricular cavity size. Systolic   function is normal.        Sleep Studies : None    Objective:  Vitals: There were no vitals taken for this visit.   Exam:  Gen: Well Appearing, demonstrates insight  Skin: No rash or lesions on bridge of nose or mouth          Assessment & Plan  Insomnia, unspecified type  Discussed sleep hygiene measures including regular sleep schedule, optimal sleep environment, and relaxing presleep rituals.  Avoid daytime naps.  Avoid caffeine after noon.  Recommended daily exercise.  Educational materials distributed.  Continue taking medication(s): Trazodone 100 mg nightly  CBTI referral  Orders:    Ambulatory referral/consult to Sleep Disorders    Ambulatory referral/consult to Behavioral Health; Future    Sleep apnea, unspecified type  Diagnostic Testing: Home Sleep Apnea Test (HST) is indicated due to comorbid conditions: Hypertension, Anxiety, Depression, and Cognitive Impairment with symptoms: snoring, gasping for air, choking , witnessed apneas, interrupted sleep, nocturia, restless sleep, non refreshing sleep, excessive daytime sleepiness, ESS: 6/24, and cognitive dysfunction  Education & Treatment Options:  Discussed the etiology and consequences of untreated CARISA, including risks of cardiovascular disease,  hypertension, stroke, metabolic dysfunction, and excessive daytime sleepiness.  Reviewed treatment options:  Positive Airway Pressure (PAP) therapy, Weight loss Positional therapy, Oral appliance therapy  Lifestyle Modifications: Advised weight management, alcohol reduction, and optimizing sleep hygiene.  Safety Precautions: Recommended avoiding driving if experiencing excessive daytime sleepiness.  Next Steps:  If CARISA is confirmed, patient agrees to trial APAP  If PAP therapy is initiated, follow-up within 31-90 days to assess compliance and effectiveness.  Results will be communicated by Mychart   Orders:    Home Sleep Study; Future

## 2025-05-07 ENCOUNTER — TELEPHONE (OUTPATIENT)
Dept: SLEEP MEDICINE | Facility: OTHER | Age: 70
End: 2025-05-07
Payer: MEDICARE

## 2025-05-08 ENCOUNTER — PATIENT MESSAGE (OUTPATIENT)
Facility: CLINIC | Age: 70
End: 2025-05-08
Payer: MEDICARE

## 2025-05-08 NOTE — PROGRESS NOTES
Personal Information    Full Name: Nguyen Ivan 1955    PCP- Dr. Blackman    Medical History    Do you have a chronic medical condition? (e.g., diabetes, hypertension, thyroid issues)   If yes, please specify:   Yes - HTN    2.   Do you have a history of hormone- related conditions? (e.g., endometriosis, breast cancer)   If yes, please explain:   Yes - Breast Cancer 2017. Lumpectomy & Bilateral mastectomy 2017    3.   Have you previously or are you currently taking hormone replacement therapy?   If yes, please list:   No    Menstrual History    At what age did you begin menstruating?   11    2.   Have you experienced any changes in your menstrual cycle in the last year?   If yes, please describe:   No    3.   When was your last menstrual period or age of last period?   Age 44    4.   Have you had a hysterectomy?   If yes, at what age?  No    Symptoms Assesments      Are you experiencing any of the following symptoms:  Hot Flashes: Yes   Night Sweats: Yes   Vaginal Dryness or Pain with Axis: Yes   Mood Swings: Yes   Anxiety or Depression: Yes effexpr XR 75 mg daily   Sleep Disturbances: Yes   Fatigue: Yes   Weight Gain: Yes   Joint or Muscle Pain: Yes   Memory Issues or Brain Fog: Yes   Decreased Interest in Sex (Low Libido): Yes     Lifestyle Factors    How would you describe your diet?  Other- lack of appetite/sugar craving     2.   How often do you exercise?   Regularly    3.   Do you smoke or consume alcohol?   If yes, please specify frequency:   Yes - Social drinker- Does not smoke     4.   How would you rate your stress levels?   High    Family History    Is there a family history of menopause-related conditions? (e.g., osteoporosis, breast cancer)  If yes, please specify  Yes - Sister Breast Cancer  2 paternal aunts Breast cancer      2.   Is there a family history of heart disease?   If yes, please specify   Yes - Heart attack    --------------------------------------------------------  Mammogram 4/2025  Colonoscopy 6/2023  Annual/Pap 2016  -------------------------------------------------------------  Spoke with patient for a total of 30 minutes during virtual visit.  Patient was guided through expectations and treatment options.     Questions answered. Encouraged to send message or call office with any questions/concerns. Verbalized understanding.       Sandrine

## 2025-05-14 ENCOUNTER — TELEPHONE (OUTPATIENT)
Dept: SLEEP MEDICINE | Facility: CLINIC | Age: 70
End: 2025-05-14
Payer: MEDICARE

## 2025-05-14 ENCOUNTER — TELEPHONE (OUTPATIENT)
Dept: SLEEP MEDICINE | Facility: OTHER | Age: 70
End: 2025-05-14
Payer: MEDICARE

## 2025-05-14 NOTE — TELEPHONE ENCOUNTER
Hailey Nix MA Lysenko, Liudmila, MD  Caller: Unspecified (Today, 10:22 AM)  Ok, thanks.          Previous Messages       ----- Message -----  From: Iraj Mckeon  Sent: 5/14/2025  11:43 AM CDT  To: Hailey Nix MA    Left message about scheduling  ----- Message -----  From: Hailey Nix MA  Sent: 5/14/2025  10:25 AM CDT  To: Iraj Mckeon    Good morning.  ----- Message -----  From: Jasmine Morin  Sent: 5/14/2025  10:23 AM CDT  To: [facility] Clinical Support Staff    Name of Who is Calling: HESHAM LOYOLA [[id number]]          What is the request in detail: Pt is requesting a call back to get scheduled for home sleep study. Please assist.          Can the clinic reply by [facility]: No

## 2025-05-16 ENCOUNTER — HOSPITAL ENCOUNTER (OUTPATIENT)
Dept: SLEEP MEDICINE | Facility: OTHER | Age: 70
Discharge: HOME | End: 2025-05-16
Payer: COMMERCIAL

## 2025-05-16 DIAGNOSIS — G47.33 OSA (OBSTRUCTIVE SLEEP APNEA): Primary | ICD-10-CM

## 2025-05-16 DIAGNOSIS — G47.30 SLEEP APNEA, UNSPECIFIED TYPE: ICD-10-CM

## 2025-05-16 PROCEDURE — 95800 SLP STDY UNATTENDED: CPT

## 2025-05-19 PROBLEM — G47.30 SLEEP APNEA: Status: ACTIVE | Noted: 2025-05-19

## 2025-05-20 ENCOUNTER — RESULTS FOLLOW-UP (OUTPATIENT)
Dept: SLEEP MEDICINE | Facility: CLINIC | Age: 70
End: 2025-05-20
Payer: MEDICARE

## 2025-05-20 DIAGNOSIS — F41.9 ANXIETY: ICD-10-CM

## 2025-05-20 DIAGNOSIS — G47.33 OBSTRUCTIVE SLEEP APNEA: Primary | ICD-10-CM

## 2025-05-20 PROCEDURE — 95800 SLP STDY UNATTENDED: CPT | Mod: 26,,, | Performed by: PSYCHIATRY & NEUROLOGY

## 2025-05-20 RX ORDER — ALPRAZOLAM 0.25 MG/1
TABLET ORAL
Qty: 30 TABLET | Refills: 0 | Status: SHIPPED | OUTPATIENT
Start: 2025-05-20

## 2025-05-20 NOTE — PROCEDURES
Patient Name Nguyen Ivan Study Ordered By Glen Santiago  Date of Night 1 05/16/2025 12:48:20 PM Date of Birth 1955  Identification Number [id number]  Overall AHI (4%)* Overall RDI % time < 90% Sp02 Mean Sp02 % time snoring > 30dB  20 30 0% 96.3% 1.9%  PHYSICIAN INTERPRETATION AND COMMENTS: Findings are consistent with moderate, obstructive sleep apnea (CARISA)  (G47.33), by overall AHI (apnea hypopnea index). However, findings on this study suggest that the degree of sleep  disordered breathing is in the severe range, when RDI is measured. This study was technically adequate to allow for  interpretation.  CLINICAL HISTORY: 70 year old female presented with: 14 inch neck, BMI of 25.3, an [place] sleepiness score of 6, history  of hypertension, a previous diagnosis of CARISA and symptoms of nocturnal snoring. Based on the clinical history, the patient  has a high pre-test probability of having Mild CARISA.  SLEEP STUDY FINDINGS: Patient underwent a 1 night Home Sleep Test and by behavioral criteria, slept for approximately  6.34 hours, with a sleep latency of 12 minutes and a sleep efficiency of 93%. Moderate sleep disordered breathing (AHI=20)  is noted based on a 4% hypopnea desaturation criteria, predominantly in the supine position (31 events/hour). The patient  slept supine 29.8% of the night based on valid recording time of 6.5 hours and is 2.1 times as likely to have  apneas/hypopneas when supine. When considering more subtle measures of sleep disordered breathing, the overall  respiratory disturbance index is severe (RDI=30) based on a 1% hypopnea desaturation criteria with confirmation by  surrogate arousal indicators. The apneas/hypopneas are accompanied by minimal oxygen desaturation (percent time  below 90% SpO2: 0%, Min SpO2: 79.3%). The average desaturation across all sleep disordered breathing events is 2.7%.  Snoring occurs for 1.9% (30 dB) of the study. The mean pulse rate is 63 BPM, with  frequent pulse rate variability (41 events  with >= 6 BPM increase/decrease per hour).  TREATMENT CONSIDERATIONS: Consider trial of Auto-titrating CPAP 6-20 cm, mask of patient's choice, and heated  humidification. If patient has difficulty with CPAP adherence or ongoing CARISA symptoms or despite CPAP adherence, then  consider an in-lab titration sleep study in order to determine optimal fixed CPAP setting. Alternatively consider oral  appliance fitted by a dentist specializing in these devices, or surgical consultation for uvulopalatopharyngoplasty (UPPP)  for treatment of obstructive sleep apnea.  DISEASE MANAGEMENT CONSIDERATIONS: Definitive treatment for CARISA is recommended. Consider Sleep Clinic referral for  CARISA management.  Signature: Date: 05- 13:35:34 EST  Study Review: The raw data of this FILIPE study has been reviewed, with the report confirmed and electronically signed by Janelle Morris, Read  and interpreted by Janelle Morris MD, Diplomate, Sleep Medicine, ABPN.. Caution

## 2025-05-21 ENCOUNTER — TELEPHONE (OUTPATIENT)
Dept: SLEEP MEDICINE | Facility: CLINIC | Age: 70
End: 2025-05-21
Payer: MEDICARE

## 2025-05-21 NOTE — TELEPHONE ENCOUNTER
Spoke with patient regarding sleep study results, results consistent with moderate obstructive sleep apnea, AHI 20.  Patient concerned with starting CPAP therapy, would not like to treat sleep apnea at this time.  Patient would like to do CBTI program for insomnia.  Referral placed during initial visit.  Educated patient on risks of untreated sleep apnea including heart attack, stroke, dementia, diabetes, etc. patient verbalized understanding.  All questions answered.  Would like to do an insomnia follow-up in 6 months.  We will have MA call to schedule follow up.

## 2025-06-03 ENCOUNTER — OFFICE VISIT (OUTPATIENT)
Dept: PSYCHIATRY | Facility: CLINIC | Age: 70
End: 2025-06-03
Payer: COMMERCIAL

## 2025-06-03 VITALS
HEIGHT: 66 IN | BODY MASS INDEX: 24.85 KG/M2 | SYSTOLIC BLOOD PRESSURE: 137 MMHG | WEIGHT: 154.63 LBS | DIASTOLIC BLOOD PRESSURE: 88 MMHG | HEART RATE: 84 BPM

## 2025-06-03 DIAGNOSIS — F41.9 ANXIETY DISORDER, UNSPECIFIED TYPE: Primary | ICD-10-CM

## 2025-06-03 DIAGNOSIS — G47.30 SLEEP APNEA, UNSPECIFIED TYPE: ICD-10-CM

## 2025-06-03 DIAGNOSIS — G47.00 INSOMNIA, UNSPECIFIED TYPE: ICD-10-CM

## 2025-06-03 PROCEDURE — 90792 PSYCH DIAG EVAL W/MED SRVCS: CPT | Mod: S$GLB,,, | Performed by: NURSE PRACTITIONER

## 2025-06-03 PROCEDURE — 3079F DIAST BP 80-89 MM HG: CPT | Mod: CPTII,S$GLB,, | Performed by: NURSE PRACTITIONER

## 2025-06-03 PROCEDURE — 4010F ACE/ARB THERAPY RXD/TAKEN: CPT | Mod: CPTII,S$GLB,, | Performed by: NURSE PRACTITIONER

## 2025-06-03 PROCEDURE — 3075F SYST BP GE 130 - 139MM HG: CPT | Mod: CPTII,S$GLB,, | Performed by: NURSE PRACTITIONER

## 2025-06-03 PROCEDURE — 99999 PR PBB SHADOW E&M-EST. PATIENT-LVL III: CPT | Mod: PBBFAC,,, | Performed by: NURSE PRACTITIONER

## 2025-06-03 PROCEDURE — 3044F HG A1C LEVEL LT 7.0%: CPT | Mod: CPTII,S$GLB,, | Performed by: NURSE PRACTITIONER

## 2025-06-23 ENCOUNTER — TELEPHONE (OUTPATIENT)
Dept: INTERNAL MEDICINE | Facility: CLINIC | Age: 70
End: 2025-06-23
Payer: MEDICARE

## 2025-06-23 NOTE — TELEPHONE ENCOUNTER
Spoke with kev from [facility] regarding pt needed labs. Kev stated that Dr. Ge Newman Monitored her wounds and stated that pt does not need no lab. Kev stated that patient told her that she is not bleeding anymore. Kev also stated that she will reach out to Dr. Biswas or somebody in the care team.    Copied from CRM #[id number]. Topic: General Inquiry - Return Call  >> Jun 23, 2025  7:16 AM Adrianna wrote:  Who Called: Kev Calling from [facility] [phone]    Calling to talk to nurse in regards to see if Dr can review patients labs.

## 2025-07-03 DIAGNOSIS — I10 PRIMARY HYPERTENSION: ICD-10-CM

## 2025-07-03 RX ORDER — LOSARTAN POTASSIUM 25 MG/1
25 TABLET ORAL
Qty: 90 TABLET | Refills: 3 | Status: SHIPPED | OUTPATIENT
Start: 2025-07-03

## 2025-07-03 NOTE — TELEPHONE ENCOUNTER
Care Due:                  Date            Visit Type   Department     Provider  --------------------------------------------------------------------------------                                MYCHART                              ANNUAL                              CHECKUP/PHY  [facility] INTERNAL  Last Visit: 01-      [facility]       Chinyere Biswas                              EP -                              PRIMARY      [facility] INTERNAL  Next Visit: 09-      CARE ([facility])   MEDICINE       Chinyere Biswas                                                            Last  Test          Frequency    Reason                     Performed    Due Date  --------------------------------------------------------------------------------    Vitamin D...  12 months..  cholecalciferol,.........  Not Found    Overdue    [employer] Embedded Care Due Messages. Reference number: [id number].   7/03/2025 5:22:04 AM CDT

## 2025-07-09 ENCOUNTER — OFFICE VISIT (OUTPATIENT)
Dept: PSYCHIATRY | Facility: CLINIC | Age: 70
End: 2025-07-09
Payer: COMMERCIAL

## 2025-07-09 VITALS
BODY MASS INDEX: 24.75 KG/M2 | SYSTOLIC BLOOD PRESSURE: 116 MMHG | HEART RATE: 108 BPM | WEIGHT: 154 LBS | DIASTOLIC BLOOD PRESSURE: 76 MMHG | HEIGHT: 66 IN

## 2025-07-09 DIAGNOSIS — G47.00 INSOMNIA, UNSPECIFIED TYPE: ICD-10-CM

## 2025-07-09 DIAGNOSIS — F41.1 GAD (GENERALIZED ANXIETY DISORDER): Primary | ICD-10-CM

## 2025-07-09 PROCEDURE — 3044F HG A1C LEVEL LT 7.0%: CPT | Mod: CPTII,S$GLB,, | Performed by: NURSE PRACTITIONER

## 2025-07-09 PROCEDURE — 3008F BODY MASS INDEX DOCD: CPT | Mod: CPTII,S$GLB,, | Performed by: NURSE PRACTITIONER

## 2025-07-09 PROCEDURE — 4010F ACE/ARB THERAPY RXD/TAKEN: CPT | Mod: CPTII,S$GLB,, | Performed by: NURSE PRACTITIONER

## 2025-07-09 PROCEDURE — 3074F SYST BP LT 130 MM HG: CPT | Mod: CPTII,S$GLB,, | Performed by: NURSE PRACTITIONER

## 2025-07-09 PROCEDURE — G2211 COMPLEX E/M VISIT ADD ON: HCPCS | Mod: S$GLB,,, | Performed by: NURSE PRACTITIONER

## 2025-07-09 PROCEDURE — 3078F DIAST BP <80 MM HG: CPT | Mod: CPTII,S$GLB,, | Performed by: NURSE PRACTITIONER

## 2025-07-09 PROCEDURE — 99214 OFFICE O/P EST MOD 30 MIN: CPT | Mod: S$GLB,,, | Performed by: NURSE PRACTITIONER

## 2025-07-09 PROCEDURE — 99999 PR PBB SHADOW E&M-EST. PATIENT-LVL III: CPT | Mod: PBBFAC,,, | Performed by: NURSE PRACTITIONER

## 2025-07-09 RX ORDER — VENLAFAXINE HYDROCHLORIDE 150 MG/1
150 CAPSULE, EXTENDED RELEASE ORAL DAILY
Qty: 30 CAPSULE | Refills: 2 | Status: SHIPPED | OUTPATIENT
Start: 2025-07-09 | End: 2025-08-08

## 2025-07-09 NOTE — PROGRESS NOTES
Outpatient Psychiatry Follow-Up Visit (MD/NP)    7/9/2025    Clinical Status of Patient:  Outpatient (Ambulatory)    Chief Complaint:  Nguyen Ivan is a 70 y.o. female who presents today for follow-up of depression, anxiety, and insomnia.  Met with patient.      Interval History and Content of Current Session:  Interim Events/Subjective Report/Content of Current Session:     Nguyen reports feeling good, with improved anxiety levels, rating their average anxiety at 3 out of 10. She states she experienced one day of heightened emotions during post-surgery recovery, crying due to pain and concerns about aging, but this was an isolated incident. Nguyen underwent right knee surgery three weeks ago. The initial two weeks were challenging, with significant pain requiring prescription medication and disrupted sleep due to post-operative care routines. By the third week, pain had significantly decreased, allowing a switch to OTC pain relief. Staples were removed recently, and the patient reports feeling much better overall.    Nguyen continues to use Trazodone 100mg nightly and occasionally uses a medical marijuana gummy for sleep aid, reporting it helps them fall asleep within 30 minutes. Nguyen has lost approximately 15 lbs over the past three months, mostly in the last three weeks, attributed to decreased appetite during recovery. Current weight is around 152-153 lbs, down from 165 lbs several months ago.    Nguyen experiences episodes of excessive sweating about 3 times per month, typically triggered by excitement or social situations, causing significant embarrassment and contributing to anxiety.She plans to see her gynecologist since it started during menopause.     We discussed switching to venlafaxine xr 150 mg po daily and she agreed.  She is no longer taking Wellbutrin 75 mg po BID and denies feeling depressed but reports decreased anxiety.     She denies low mood, anhedonia, sleep disturbance,  guilt/worthlessness, crying spells, decreased energy, decreased motivation, trouble concentrating, decrease appetite, irritability, and hopelessness.    Nguyen denies suicidal thoughts and no suicide plan or intent. No previous suicide attempts, and no access to guns. She denies paranoia, hallucinations, irritability, racing thoughts, impulsivity, alcohol or drug abuse.    MEDICATIONS:  Nguyen is on Trazodone 100 mg taken orally at night for sleep. She takes Venlafaxine 75 mg, 2 tablets, orally twice daily for anxiety and depression. She is also on Metformin for pre-diabetes. Nguyen takes a THC gummy orally about 2 nights a week as a sleep aid.    FAMILY HISTORY:  Family history is significant for a family member with a history of Alzheimer's and dementia.    SURGICAL HISTORY:  Nguyen underwent right knee surgery 3 weeks ago and is healing well.    LIFESTYLE:  Nguyen lives at home with her , who has been her caretaker since her recent knee surgery. Nguyen enjoys hosting dinners and barbecues. She recently attended a gathering at a friend's house with her 's high school friends. 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      ROS:  General: -fever, -chills, -fatigue, -weight gain, +weight loss, +loss of appetite, loss of energy, +excessive sweating  Eyes: -vision changes, -redness, -discharge  ENT: -ear pain, -nasal congestion, -sore throat  Cardiovascular: -chest pain, -palpitations, -lower extremity edema  Respiratory: -cough, -shortness of breath  Gastrointestinal: -abdominal pain, -nausea, -vomiting, -diarrhea, -constipation, -blood in stool  Genitourinary: -dysuria, -hematuria, -frequency  Musculoskeletal: -joint pain, -muscle pain  Skin: -rash, -lesion  Neurological: -headache, -dizziness, -numbness, -tingling  Psychiatric: +anxiety, -depression, -sleep difficulty            Psychiatric Review Of Systems - Is patient experiencing or having changes in:  sleep: yes, takes trazodone 100 mg  "po qhs  appetite: no  weight: yes lost 15 lbs over the past 3 months  energy/anergy: no same  interest/pleasure/anhedonia: no   somatic symptoms: no  anxiety/panic: yes better  guilty/hopelessness: no  concentration: no  S.I.B.s/risky behavior: no  Irritability: no  Racing thoughts: no  Impulsive behaviors: no  Paranoia:no  AVH:no  Suicidal thoughts/plan/intent: no    Psychotherapy:  Target symptoms: depression, anxiety , insomnia  Why chosen therapy is appropriate versus another modality: relevant to diagnosis, patient responds to this modality, evidence based practice  Outcome monitoring methods: self-report, observation  Therapeutic intervention type: insight oriented psychotherapy, behavior modifying psychotherapy, supportive psychotherapy  Topics discussed/themes: building skills sets for symptom management, symptom recognition  The patient's response to the intervention is accepting. The patient's progress toward treatment goals is good.   Duration of intervention: 15 minutes.    Review of Systems       Past Medical, Family and Social History: The patient's past medical, family and social history have been reviewed and updated as appropriate within the electronic medical record - see encounter notes.    Compliance: yes    Side effects: None    Risk Parameters:  Patient reports no suicidal ideation  Patient reports no homicidal ideation  Patient reports no self-injurious behavior  Patient reports no violent behavior    Exam (detailed: at least 9 elements; comprehensive: all 15 elements)   Constitutional  Vitals:  Most recent vital signs, dated less than and greater than 90 days prior to this appointment, were reviewed.   Vitals:    07/09/25 0845   BP: 116/76   Pulse: 108   Weight: 69.8 kg (153 lb 15.9 oz)   Height: 5' 6" (1.676 m)        General:  unremarkable, age appropriate     Musculoskeletal  Muscle Strength/Tone:  not examined   Gait & Station:  non-ataxic     Psychiatric  Speech:  no latency; no press " "  Mood & Affect:  "Better"  congruent and appropriate   Thought Process:  normal and logical   Associations:  intact   Thought Content:  normal, no suicidality, no homicidality, delusions, or paranoia   Insight:  intact, has awareness of illness   Judgement: behavior is adequate to circumstances   Orientation:  grossly intact   Memory: intact for content of interview, grossly intact   Language: grossly intact, able to name, able to repeat   Attention Span & Concentration:  able to focus   Fund of Knowledge:  intact and appropriate to age and level of education, familiar with aspects of current personal life            Assessment and Diagnosis   Status/Progress: Based on the examination today, the patient's problem(s) is/are improved.  New problems have not been presented today.   Co-morbidities, Diagnostic uncertainty, and Lack of compliance are not complicating management of the primary condition.  There are no active rule-out diagnoses for this patient at this time.     General Impression: Patient is stable and doing well. Patient requested keeping the same dosages of her current medication due to their effectiveness in managing all of her symptoms. Assessed anxiety and sleep, noting improvement with current medication regimen. Changed venlafaxine from 75 mg twice daily to 150 mg extended release daily in the morning to address potential medication-related sweating.        Anxiety disorder, unspecified type  F41.9 300.00    2. Insomnia, unspecified type  G47.00 780.52   3. Sleep apnea, unspecified type  G47.30 780.57         No diagnosis found.    Intervention/Counseling/Treatment Plan   Medication Management: Continue current medications. The risks and benefits of medication were discussed with the patient.  Labs, Diagnostic Studies: reviewed all recent labs  - Changed venlafaxine to 150 mg extended release daily in the morning  - Discuss sweating concerns with gynecologist at upcoming appointment  - Recommend " "discussing sweating concerns with gynecologist at upcoming appointment.  - Continue trazodone  100 mg po qhs for sleep initiation management.  - Discontinued bupropion 75 mg po BID as patient denies depressive symptoms.  -I've explained to her that drugs of the SSRI/SNRI class can have side effects such as weight gain, sexual dysfunction, insomnia, headache, nausea. These medications are generally effective at alleviating symptoms of anxiety and/or depression. Let me know if significant side effects do occur.  -We discussed risk of Serotonin Syndrome including symptoms in order of appearance: diarrhea, restlessness, extreme agitation, autonomic instability, hyperthermia, rigidity, coma, and death.  -Discussed black box warning of increased Suicidal thoughts in individuals younger than 24 years old and the steps to take if patient ever experiences increased SI.   -Contracted for safety and non suicide plan (contact family, suicide hotline, call 911 or go the nearest emergency room)  -Discussed informed consent, diagnosis, risks and benefits of proposed treatment above vs alternative treatments vs no treatment, and potential side effects of these treatments. The patient expresses understanding of the above and displays the capacity to agree with this treatment given said understanding. Patient also agrees that, currently, the benefits outweigh the risks and would like to pursue treatment at this time. Answered all questions and discussed follow up. Encouraged patient to contact us with any questions or concerns.   -Encouraged Patient to keep future appointments.  -Take medications as prescribed   -Discussed the risks of THC on cognition and health  -Patient to present to emergency department "ED" for thoughts to harm herself or others       Return to Clinic: 1 month, or earlier as needed      This note was generated with the assistance of ambient listening technology. Verbal consent was obtained by the patient and " accompanying visitor(s) for the recording of patient appointment to facilitate this note. I attest to having reviewed and edited the generated note for accuracy, though some syntax or spelling errors may persist. Please contact the author of this note for any clarification.

## 2025-07-10 ENCOUNTER — PATIENT MESSAGE (OUTPATIENT)
Dept: HEMATOLOGY/ONCOLOGY | Facility: CLINIC | Age: 70
End: 2025-07-10
Payer: MEDICARE

## 2025-07-10 ENCOUNTER — OFFICE VISIT (OUTPATIENT)
Dept: OBSTETRICS AND GYNECOLOGY | Facility: CLINIC | Age: 70
End: 2025-07-10
Attending: OBSTETRICS & GYNECOLOGY
Payer: COMMERCIAL

## 2025-07-10 VITALS
HEIGHT: 66 IN | DIASTOLIC BLOOD PRESSURE: 84 MMHG | WEIGHT: 154 LBS | BODY MASS INDEX: 24.75 KG/M2 | HEART RATE: 78 BPM | SYSTOLIC BLOOD PRESSURE: 130 MMHG

## 2025-07-10 DIAGNOSIS — C50.919 HORMONE RECEPTOR POSITIVE MALIGNANT NEOPLASM OF BREAST, UNSPECIFIED LATERALITY: ICD-10-CM

## 2025-07-10 DIAGNOSIS — N95.9 POST MENOPAUSAL PROBLEMS: ICD-10-CM

## 2025-07-10 DIAGNOSIS — M54.59 MECHANICAL LOW BACK PAIN: Primary | ICD-10-CM

## 2025-07-10 DIAGNOSIS — Z79.811 AROMATASE INHIBITOR USE: ICD-10-CM

## 2025-07-10 DIAGNOSIS — L74.9 SWEATING ABNORMALITY: ICD-10-CM

## 2025-07-10 PROCEDURE — 99999 PR PBB SHADOW E&M-EST. PATIENT-LVL IV: CPT | Mod: PBBFAC,,, | Performed by: OBSTETRICS & GYNECOLOGY

## 2025-07-10 PROCEDURE — 99214 OFFICE O/P EST MOD 30 MIN: CPT | Mod: S$GLB,,, | Performed by: OBSTETRICS & GYNECOLOGY

## 2025-07-10 PROCEDURE — 3044F HG A1C LEVEL LT 7.0%: CPT | Mod: CPTII,S$GLB,, | Performed by: OBSTETRICS & GYNECOLOGY

## 2025-07-10 PROCEDURE — 4010F ACE/ARB THERAPY RXD/TAKEN: CPT | Mod: CPTII,S$GLB,, | Performed by: OBSTETRICS & GYNECOLOGY

## 2025-07-10 PROCEDURE — 3079F DIAST BP 80-89 MM HG: CPT | Mod: CPTII,S$GLB,, | Performed by: OBSTETRICS & GYNECOLOGY

## 2025-07-10 PROCEDURE — 3075F SYST BP GE 130 - 139MM HG: CPT | Mod: CPTII,S$GLB,, | Performed by: OBSTETRICS & GYNECOLOGY

## 2025-07-10 PROCEDURE — 1159F MED LIST DOCD IN RCRD: CPT | Mod: CPTII,S$GLB,, | Performed by: OBSTETRICS & GYNECOLOGY

## 2025-07-10 PROCEDURE — 3288F FALL RISK ASSESSMENT DOCD: CPT | Mod: CPTII,S$GLB,, | Performed by: OBSTETRICS & GYNECOLOGY

## 2025-07-10 PROCEDURE — 1101F PT FALLS ASSESS-DOCD LE1/YR: CPT | Mod: CPTII,S$GLB,, | Performed by: OBSTETRICS & GYNECOLOGY

## 2025-07-10 PROCEDURE — 1126F AMNT PAIN NOTED NONE PRSNT: CPT | Mod: CPTII,S$GLB,, | Performed by: OBSTETRICS & GYNECOLOGY

## 2025-07-10 PROCEDURE — 3008F BODY MASS INDEX DOCD: CPT | Mod: CPTII,S$GLB,, | Performed by: OBSTETRICS & GYNECOLOGY

## 2025-07-10 RX ORDER — HYDROCODONE BITARTRATE AND ACETAMINOPHEN 10; 325 MG/1; MG/1
1 TABLET ORAL
COMMUNITY
Start: 2025-06-18

## 2025-07-10 RX ORDER — ASPIRIN 81 MG/1
81 TABLET ORAL 2 TIMES DAILY
COMMUNITY

## 2025-07-10 NOTE — PROGRESS NOTES
"SUBJECTIVE:   70 y.o. female   presents today to discuss symptoms she is having.  No LMP recorded (lmp unknown). Patient is postmenopausal..  She saw Dr. Obrien for concerns about memory. She has dementia in her family. She also reports having "intense perspiration at random times." She also reports will get flushed. These symptoms happen 6-7 times per month. .    She reports started menopause around age 45- "it was terrible". She had night sweats and hot flashes- she was on Venlafaxine. She never went on HRT. Her symptoms went away.   She reports these new symptoms started about 10-15 years ago. She had breast cancer in 2017- she was on Letrozole for 3 years.   She has been on Venlafaxine for about 8 years.  Sleep is better - she tried Belsomra- did not help. She reports Trazodone with THC gummie is best.   She reports history of severe back pain and knee pain. She had left knee replacement.     Past Medical History:   Diagnosis Date    Breast cancer 2017    Birads 4  Bilateral Mastectomy     Depression     Family history of breast cancer in sister     age 45,lumpectomy,XRT    H/O abnormal mammogram 10/30/2017    Birads 4    Mitral valve prolapse      Past Surgical History:   Procedure Laterality Date    BILATERAL MASTECTOMY Bilateral 2017    BREAST LUMPECTOMY  2017    atypical ductal hyperplasia     BREAST RECONSTRUCTION Bilateral 3/2018 & 2018    CHOLECYSTECTOMY      COLONOSCOPY      COLONOSCOPY N/A 2023    Procedure: COLONOSCOPY;  Surgeon: Ozzy Dela Cruz MD;  Location: [facility] ([address] [place] [address]);  Service: Colon and Rectal;  Laterality: N/A;   referred by Dawn Black NP/MANDIE/instr. to portal-st    DILATION AND CURETTAGE OF UTERUS      Missed Ab    EYE SURGERY  2017    cataracts    HERNIA REPAIR  2005    LIVER LOBECTOMY  2005    REDUCTION OF BOTH BREASTS  2009    TONSILLECTOMY  1970    TOTAL KNEE ARTHROPLASTY Left 2025    TUBAL LIGATION  1985    with last delivery  "    UMBILICAL HERNIA REPAIR       Social History[1]  Family History   Problem Relation Name Age of Onset    Heart attack Father Mauro MONTELONGO Armada     Alcohol abuse Father Mauro MONTELONGO Raf             Depression Father Mauro MONTELONGO Armada     Dementia Mother Rosana Woods Armada     Alzheimer's disease Mother Rosana Woods Raf     Arthritis Mother Rosana Woods Raf             Cancer Mother Rosana Woods Raf     Hearing loss Mother Rosana Woods Armada     Miscarriages / Stillbirths Mother Rosana Carbone     Alcohol abuse Brother Carlitos Carbone             Kidney disease Brother Carlitos Carbone     Alcohol abuse Brother Dirk Carbone     Alcohol abuse Brother Jayme Carbone     Alcohol abuse Brother Mauro Armada Mathew             Drug abuse Brother Mauro Carbone Mathew     Kidney disease Brother Mauro Carbone Mathew     Breast cancer Sister Pippa Jose 45        XRT    Cancer Sister Pippa Jose     Skin cancer Sister Pippa Jose     Alcohol abuse Sister Josie Carbone Axel     Miscarriages / Stillbirths Sister Amanda Urbina     Dementia Sister Amanda Urbina     Breast cancer Maternal Aunt      Breast cancer Paternal Aunt      Colon cancer Neg Hx      Ovarian cancer Neg Hx      Uterine cancer Neg Hx      Cervical cancer Neg Hx      Prostate cancer Neg Hx       OB History    Para Term  AB Living   4 3 3  1 3   SAB IAB Ectopic Multiple Live Births   1    3      # Outcome Date GA Lbr Bright/2nd Weight Sex Type Anes PTL Lv   4 Term  40w0d   F Vag-Spont EPI  REBEKA      Birth Comments: BTL Done    3 SAB  8w0d          2 Term  40w0d   M Vag-Spont EPI  REBEKA   1 Term  40w0d   F Vag-Spont EPI  REBEKA           Current Medications[2]  Allergies: Patient has no known allergies.     The 10-year ASCVD risk score (Daniel MAGANA, et al., 2019) is: 14.7%    Values used to calculate the score:      Age: 70 years      Sex: Female      Is  Non- : No      Diabetic: No      Tobacco smoker: No      Systolic Blood Pressure: 130 mmHg      Is BP treated: Yes      HDL Cholesterol: 70 mg/dL      Total Cholesterol: 149 mg/dL      ROS:  Constitutional: no weight loss, weight gain, fever, fatigue    Cardiovascular: No inability to lie flat, chest pain, exercise intolerance, swelling, heart palpitations  Respiratory: No wheezing, coughing blood, shortness of breath, or cough  Gastrointestinal: No diarrhea, bloody stool, nausea/vomiting, constipation, gas, hemorrhoids  Genitourinary: No blood in urine, painful urination, urgency of urination, frequency of urination, incomplete emptying, incontinence, abnormal bleeding, painful periods, heavy periods, vaginal discharge, vaginal odor, painful intercourse, sexual problems, bleeding after intercourse.  Musculoskeletal: No muscle weakness, +joint pain  Skin/Breast: No painful breasts, nipple discharge, masses, rash, ulcers  Neurological: No passing out, seizures, numbness, headache  Endocrine: No diabetes, hypothyroid, hyperthyroid, hot flashes, hair loss, abnormal hair growth, acne, +sweating   Psychiatric: No depression, crying  Hematologic: No bruises, bleeding, swollen lymph nodes, anemia.      Physical Exam  deferred    ASSESSMENT:   1. Mechanical low back pain  Acupuncture      2. Aromatase inhibitor use  Ambulatory referral/consult to Obstetrics / Gynecology      3. Post menopausal problems  Ambulatory referral/consult to Obstetrics / Gynecology      4. Hormone receptor positive malignant neoplasm of breast, unspecified laterality  Ambulatory referral/consult to Obstetrics / Gynecology      5. Sweating abnormality              PLAN:   Counseled her that I do not think her intense perspiration and sweating is related to menopause or hormonal changes.  Counseled her that acupuncture may be beneficial on multiple levels for her.  Counseled her on the indication for back pain joint pain and  for symptoms such as sweating of unknown origin.  Counseled her on the possible benefits of yoga and tight she to help with anxiety and sleep.  I will have my nurse navigator from the cancer center reach out to her about the offerings for integrative         [1]   Social History  Socioeconomic History    Marital status:    Tobacco Use    Smoking status: Never    Smokeless tobacco: Never   Substance and Sexual Activity    Alcohol use: Yes     Alcohol/week: 0.0 - 4.0 standard drinks of alcohol     Comment: Social     Drug use: Yes     Types: Marijuana     Comment: [facility] gummies/medical marijuana    Sexual activity: Yes     Partners: Male     Birth control/protection: Post-menopausal, See Surgical Hx     Comment: tubal ligation 198     Social Drivers of Health     Financial Resource Strain: Low Risk  (4/6/2025)    Overall Financial Resource Strain (CARDIA)     Difficulty of Paying Living Expenses: Not hard at all   Food Insecurity: No Food Insecurity (6/17/2025)    Received from [facility]    Hunger Vital Sign     Worried About Running Out of Food in the Last Year: Never true     Ran Out of Food in the Last Year: Never true   Transportation Needs: No Transportation Needs (6/17/2025)    Received from [facility]    PRAPARE - Transportation     In the past 12 months, has lack of transportation kept you from medical appointments or from getting medications?: No     In the past 12 months, has lack of transportation kept you from meetings, work, or from getting things needed for daily living?: No   Physical Activity: Insufficiently Active (4/6/2025)    Exercise Vital Sign     Days of Exercise per Week: 2 days     Minutes of Exercise per Session: 60 min   Stress: Stress Concern Present (4/6/2025)    [employer] - Occupational Stress Questionnaire     Feeling of Stress : Rather much   Housing Stability: Low Risk  (6/17/2025)    Received from [facility]    Housing Stability Vital Sign      Unable to Pay for Housing in the Last Year: No     Number of Times Moved in the Last Year: 0     Homeless in the Last Year: No   [2]   Current Outpatient Medications   Medication Sig Dispense Refill    HYDROcodone-acetaminophen (NORCO)  mg per tablet Take 1 tablet by mouth.      acetaminophen (TYLENOL) 650 MG TbSR       ALPRAZolam (XANAX) 0.25 MG tablet TAKE 1 TABLET(0.25 MG) BY MOUTH EVERY NIGHT AS NEEDED FOR ANXIETY 30 tablet 0    aspirin (ECOTRIN) 81 MG EC tablet Take 81 mg by mouth 2 (two) times daily.      B-COMPLEX PLUS VIT C, CALCIUM, 300 mg-150 mg calcium Tab Take 1 tablet by mouth Daily.      cholecalciferol, vitamin D3, 125 mcg (5,000 unit) capsule TAKE 1 CAPSULE BY MOUTH EVERY DAY 90 capsule 3    cyanocobalamin, vitamin B-12, 50 mcg Lozg Take 1 each by mouth.      losartan (COZAAR) 25 MG tablet TAKE 1 TABLET(25 MG) BY MOUTH EVERY DAY 90 tablet 3    metFORMIN (GLUCOPHAGE-XR) 500 MG ER 24hr tablet Take 1 tablet (500 mg total) by mouth daily with breakfast. 90 tablet 3    traZODone (DESYREL) 100 MG tablet Take 1 tablet (100 mg total) by mouth every evening. 90 tablet 1    venlafaxine (EFFEXOR-XR) 150 MG Cp24 Take 1 capsule (150 mg total) by mouth once daily. 30 capsule 2     No current facility-administered medications for this visit.

## 2025-07-21 ENCOUNTER — TELEPHONE (OUTPATIENT)
Dept: OBSTETRICS AND GYNECOLOGY | Facility: CLINIC | Age: 70
End: 2025-07-21
Payer: MEDICARE

## 2025-07-21 NOTE — TELEPHONE ENCOUNTER
Pt states she has an abscess/cyst on labia.  Has since ruptured on its own but would like appt just in case.  Scheduled with Dr. Ochoa Monday.  Suggested epsom salt soaks with gentle massage to facilitate drainage.  Call back if needed before appt.

## 2025-07-21 NOTE — TELEPHONE ENCOUNTER
i have a lump in my vaginal area size of an almond.  It's uncomfortable.  I need to see Dr. Lloyd soon. Maybe she can determine how to handle.   It's important, please.      F/u from E-visit

## 2025-07-23 ENCOUNTER — CLINICAL SUPPORT (OUTPATIENT)
Dept: REHABILITATION | Facility: HOSPITAL | Age: 70
End: 2025-07-23
Attending: OBSTETRICS & GYNECOLOGY
Payer: COMMERCIAL

## 2025-07-23 DIAGNOSIS — M54.59 MECHANICAL LOW BACK PAIN: ICD-10-CM

## 2025-07-23 PROCEDURE — 97810 ACUP 1/> WO ESTIM 1ST 15 MIN: CPT | Performed by: ACUPUNCTURIST

## 2025-07-23 PROCEDURE — 97811 ACUP 1/> W/O ESTIM EA ADD 15: CPT | Performed by: ACUPUNCTURIST

## 2025-07-23 NOTE — PROGRESS NOTES
"  Acupuncture Evaluation Note     Name: Nguyen Ivan  Clinic Number: [id number]    Traditional Chinese Medicine (TCM) Diagnosis: Qi Stagnation, Blood Stasis, and Yin Deficiency  Medical Diagnosis:   Encounter Diagnosis   Name Primary?    Mechanical low back pain         Evaluation Date: 7/23/2025    Visit #/Visits authorized: 1    Precautions: Standard and cancer    Subjective     Chief Concern: Mechanical low back pain, osteoarthritis and low back surgery, fusion at L4/L5, for radiculopathy. Arthritic pain in knees. Upper back and shoulder tension and neck pain.     Medical necessity is demonstrated by the following IMPAIRMENTS: Medical Necessity: Decreased mobility limits day to day activities, social, and emergent situations and Decreased quality of life              Aggravating Factors:  movement   Relieving Factors:  rest    Symptom Description:     Quality:  Sharp  Severity:  4  Frequency:  when active    Previous Treatments Tried:  Medication, Therapy , and Surgery    HEENT:  no complaints    Chest:  no complaints    Digestion:     Diet: in general, a "healthy" diet     Fluids: caffeinated soft drinks 0-1 /day, is drinking plenty of fluids, social drinker  Taste/Appetite: low appetite, not hungry.    Symptoms: constipation and impacted stools, sometimes nausea    Sleep: sleep apnea,     Energy Levels:  low because of recent surgery     Psychological Symptoms:  anxiety and then perspiration     Other Symptoms: history of breast cancer, 3 years on aromatase inhibitors. Sweating head, neck and upper back with no heat. 1 month ago left knee surgery, osteoarthritis.     GYN Symptoms: left liver lobectamy in 2005 for benign mass. Other surgeries - hernias, tonsilitis, breast reduction. Gallbladder removed.     Objective     Observation:     Tongue:  geographic tongue, cracks, red pointed tip, center crack     Body:     Color:     Coating:      Pulse:  left thin, chi deep   Right cun floating, chi deep         "     New Findings:      Treatment     Treatment Principles:  Move qi and blood, nourish yin, stop pain     Acupuncture points used:  4 DENNIS, Du20, Gb34, Kd10, Ki3, Ki6, Li11, Sp6, Sp9, St36, YIN QUINTEROS, and HT6    Bilateral points:  Unilateral points:  Auricular Treatment:  avila men    Needles In: 22  Needles Out: 22  Needles W/O STIM placed: 4:00 PM  Needles W/O STIM removed: 4:20 PM      Other Traditional Chinese Medicine Modalities - heat lamp     Assessment     After treatment, patient felt good, relaxed     Patient prognosis is Good.     Patient will continue to benefit from acupuncture treatment to address the deficits listed in the problem list box on initial evaluation, provide patient family education and to maximize pt's level of independence in the home and community environment.     Patient's spiritual, cultural and educational needs considered and pt agreeable to plan of care and goals.     Anticipated barriers to treatment: none    Plan     Recommend 1 /week for 5 sessions then re-assess.      Education:  Patient is aware of cumulative benefit of acupuncture

## 2025-07-28 ENCOUNTER — OFFICE VISIT (OUTPATIENT)
Dept: OBSTETRICS AND GYNECOLOGY | Facility: CLINIC | Age: 70
End: 2025-07-28
Payer: COMMERCIAL

## 2025-07-28 ENCOUNTER — HOSPITAL ENCOUNTER (OUTPATIENT)
Dept: RADIOLOGY | Facility: HOSPITAL | Age: 70
Discharge: HOME | End: 2025-07-28
Attending: OBSTETRICS & GYNECOLOGY
Payer: MEDICARE

## 2025-07-28 VITALS — HEIGHT: 66 IN | BODY MASS INDEX: 24.86 KG/M2

## 2025-07-28 DIAGNOSIS — D17.1 LIPOMA OF TORSO: ICD-10-CM

## 2025-07-28 DIAGNOSIS — L72.0 EPIDERMAL INCLUSION CYST: ICD-10-CM

## 2025-07-28 DIAGNOSIS — R22.2 SUBCUTANEOUS MASS OF ABDOMINAL WALL: ICD-10-CM

## 2025-07-28 DIAGNOSIS — D17.1 LIPOMA OF TORSO: Primary | ICD-10-CM

## 2025-07-28 PROCEDURE — 99999 PR PBB SHADOW E&M-EST. PATIENT-LVL III: CPT | Mod: PBBFAC,,, | Performed by: OBSTETRICS & GYNECOLOGY

## 2025-07-28 PROCEDURE — 1159F MED LIST DOCD IN RCRD: CPT | Mod: CPTII,S$GLB,, | Performed by: OBSTETRICS & GYNECOLOGY

## 2025-07-28 PROCEDURE — 1126F AMNT PAIN NOTED NONE PRSNT: CPT | Mod: CPTII,S$GLB,, | Performed by: OBSTETRICS & GYNECOLOGY

## 2025-07-28 PROCEDURE — 4010F ACE/ARB THERAPY RXD/TAKEN: CPT | Mod: CPTII,S$GLB,, | Performed by: OBSTETRICS & GYNECOLOGY

## 2025-07-28 PROCEDURE — 76705 ECHO EXAM OF ABDOMEN: CPT | Mod: TC

## 2025-07-28 PROCEDURE — 1101F PT FALLS ASSESS-DOCD LE1/YR: CPT | Mod: CPTII,S$GLB,, | Performed by: OBSTETRICS & GYNECOLOGY

## 2025-07-28 PROCEDURE — 76705 ECHO EXAM OF ABDOMEN: CPT | Mod: 26,,, | Performed by: RADIOLOGY

## 2025-07-28 PROCEDURE — 3008F BODY MASS INDEX DOCD: CPT | Mod: CPTII,S$GLB,, | Performed by: OBSTETRICS & GYNECOLOGY

## 2025-07-28 PROCEDURE — 3044F HG A1C LEVEL LT 7.0%: CPT | Mod: CPTII,S$GLB,, | Performed by: OBSTETRICS & GYNECOLOGY

## 2025-07-28 PROCEDURE — 99213 OFFICE O/P EST LOW 20 MIN: CPT | Mod: S$GLB,,, | Performed by: OBSTETRICS & GYNECOLOGY

## 2025-07-28 PROCEDURE — 3288F FALL RISK ASSESSMENT DOCD: CPT | Mod: CPTII,S$GLB,, | Performed by: OBSTETRICS & GYNECOLOGY

## 2025-07-28 RX ORDER — MUPIROCIN 20 MG/G
OINTMENT TOPICAL 2 TIMES DAILY
COMMUNITY
Start: 2025-06-02

## 2025-07-28 NOTE — PROGRESS NOTES
Subjective:       Patient ID: Nguyen Ivan is a 70 y.o. female.    Chief Complaint:  Cyst in groin area (Cyst started draining this wknd, white pus./Also feels a lump in pelvic area.)    Patient of Dr. Reyes's    History of Present Illness  70-year-old who called last week with a labial in her groin area.  On Friday it started  draining white creamy substance a now has significantly improved and decreased in size on the left labia  Patient also reports a similar cyst what she has had for awhile on the right labia/ mons area.  Not bothering her and has had for years.  Most worrisome today....   Patient also reports a small lump/mass circular and nontender just above her abdominoplasty incision on the right  Patient reports a recent 15 lb weight loss over the past several months since her knee surgery-         Objective:     There were no vitals filed for this visit.    Physical Exam:   Constitutional: She appears well-developed and well-nourished.             Abdominal: Soft and flat. There is no abdominal tenderness. No hernia.   2 cm palpable circular mass most likely consistent with a lipoma.  Nontender to palpation.         Genitourinary:          Genitourinary Comments: Epidural  inclusion cysts noted on the right upper labia/mons and a small resolving 1 on the left lower labia-this one drained and is resolving                           Assessment/ Plan:            Nguyen was seen today for cyst in groin area.    Diagnoses and all orders for this visit:    Lipoma of torso-palpable 2cm nontender mass just above her right abdominoplasty incision.  Nontender to palpation.  Patient just recently noticed this but has recently had a 15 lb weight loss.  Perplexing that this is new onset as it could be a lymph node but given the 15 lb weight gain and where it is located on the abdominal wall thinking it is most likely a lipoma.  Expressed to patient options that we could refer her to General surgery for  evaluation versus getting an abdominal ultrasound to help evaluate confirmation of lipoma  Patient would like to proceed with ultrasound 1st.  -     US Abdomen Complete; Future    Subcutaneous mass of abdominal wall  -     US Abdomen Complete; Future    Epidermal inclusion cyst-epidermal inclusion cyst that she came in for is now resolving.  It drained last week and is significantly improved.  The epidermal inclusion cyst on the right upper labia/mons has been stable.  Offered to remove it but patient would like to observe for now and if she would like it removed she certainly knows that she can call us any time.        Follow up if symptoms worsen or fail to improve, for And will schedule ultrasound of abdominal wall with radiology.      Health Maintenance         Date Due Completion Date    High Dose Statin Never done ---    RSV Vaccine (Age 60+ and Pregnant patients) (1 - Risk 60-74 years 1-dose series) Never done ---    COVID-19 Vaccine (6 - 2024-25 season) 09/01/2024 1/18/2023    DEXA Scan 08/01/2025 8/1/2023    Influenza Vaccine (1) 09/01/2025 1/10/2024    Hemoglobin A1c (Prediabetes) 04/07/2026 4/7/2025    TETANUS VACCINE 05/09/2029 5/9/2019    Lipid Panel 03/07/2030 3/7/2025    Colorectal Cancer Screening 06/08/2033 6/8/2023    Override on 2/1/2011: Done            Answers submitted by the patient for this visit:  Gynecologic Exam Questionnaire  (Submitted on 7/25/2025)  Chief Complaint: Gynecologic exam  genital itching: No  genital lesions: Yes  genital odor: No  genital rash: No  missed menses: No  pelvic pain: No  vaginal bleeding: No  vaginal discharge: No  Onset: in the past 7 days  Frequency: intermittently  Progression since onset: gradually improving  Pain severity: no pain  Affected side: both  Pregnant now?: No  abdominal pain: No  anorexia: Yes  chills: No  constipation: Yes  diarrhea: No  discolored urine: No  dysuria: No  fever: No  flank pain: No  frequency: No  headaches: No  hematuria:  No  nausea: Yes  painful intercourse: No  rash: No  urgency: No  Please select the characteristics of your discharge: : milky  Vaginal bleeding: no bleeding  Passing clots?: No  Passing tissue?: No  Aggravated by: nothing  treatments tried: NSAIDs  Improvement on treatment: moderate  Sexual activity: sexually active  Partner with STD symptoms: no  Birth control: nothing  Menstrual history: postmenopausal  STD: No  abdominal surgery: No   section: No  Ectopic pregnancy: No  Endometriosis: No  herpes simplex: No  gynecological surgery: No  menorrhagia: No  metrorrhagia: No  miscarriage: Yes  ovarian cysts: Yes  perineal abscess: Yes  PID: No  terminated pregnancy: No  vaginosis: No

## 2025-07-28 NOTE — Clinical Note
Saw your sweet patient today. Has a small 2 cm mass above her abdominoplasty incision with a recent 15 lb weight loss.  My initially thought this might be a lipoma but it looks like it could be a lymph node versus lipoma.  I am going to go ahead and refer her to General surgery  Really came in for a resolving epidermal inclusion cyst that she called about last week that has now resolved but was mostly concerned about the small lump above her abdominoplasty

## 2025-08-06 ENCOUNTER — CLINICAL SUPPORT (OUTPATIENT)
Dept: REHABILITATION | Facility: HOSPITAL | Age: 70
End: 2025-08-06
Payer: COMMERCIAL

## 2025-08-06 DIAGNOSIS — M54.59 MECHANICAL LOW BACK PAIN: Primary | ICD-10-CM

## 2025-08-06 PROCEDURE — 97813 ACUP 1/> W/ESTIM 1ST 15 MIN: CPT | Performed by: ACUPUNCTURIST

## 2025-08-06 PROCEDURE — 97814 ACUP 1/> W/ESTIM EA ADDL 15: CPT | Performed by: ACUPUNCTURIST

## 2025-08-06 NOTE — PROGRESS NOTES
"  Acupuncture Evaluation Note     Name: Nguyen Ivan  Clinic Number: [id number]    Traditional Chinese Medicine (TCM) Diagnosis: Qi Stagnation, Blood Stasis, and Yin Deficiency  Medical Diagnosis:   Encounter Diagnosis   Name Primary?    Mechanical low back pain Yes        Evaluation Date: 8/6/2025    Visit #/Visits authorized: 2    Precautions: Standard and cancer    Subjective     Chief Concern: Mechanical low back pain, osteoarthritis and low back surgery, fusion at L4/L5, for radiculopathy. Arthritic pain in knees. Upper back and shoulder tension and neck pain.     Medical necessity is demonstrated by the following IMPAIRMENTS: Medical Necessity: Decreased mobility limits day to day activities, social, and emergent situations and Decreased quality of life              Aggravating Factors:  movement   Relieving Factors:  rest    Symptom Description:     Quality:  Sharp  Severity:  4  Frequency:  when active    Previous Treatments Tried:  Medication, Therapy , and Surgery    HEENT:  no complaints    Chest:  no complaints    Digestion:     Diet: in general, a "healthy" diet     Fluids: caffeinated soft drinks 0-1 /day, is drinking plenty of fluids, social drinker  Taste/Appetite: low appetite, not hungry.    Symptoms: constipation and impacted stools, sometimes nausea    Sleep: sleep apnea,     Energy Levels:  low because of recent surgery     Psychological Symptoms:  anxiety and then perspiration     Other Symptoms: history of breast cancer, 3 years on aromatase inhibitors. Sweating head, neck and upper back with no heat. 1 month ago left knee surgery, osteoarthritis.     GYN Symptoms: left liver lobectamy in 2005 for benign mass. Other surgeries - hernias, tonsilitis, breast reduction. Gallbladder removed.     Objective     Observation:     Tongue:  geographic tongue, cracks, red pointed tip, center crack     Body:     Color:     Coating:      Pulse:  left thin, chi deep   Right cun floating, chi deep         "     New Findings:      Treatment     Treatment Principles:  Move qi and blood, nourish yin, stop pain     Acupuncture points used:  4 DENNIS, Du20, Gb34, Kd10, Ki3, Li11, Sp6, Sp9, St36, YIN QUINTEROS, and HT6    Bilateral points:  Unilateral points: SP10, ST34, heding  Auricular Treatment:  avila men    Needles In: 25  Needles Out: 25  Garfield W/ STIM placed: 11:50 AM  Garfield W/ STIM removed: 12:20 PM      Other Traditional Chinese Medicine Modalities - heat lamp     Assessment     After treatment, patient felt sweating, upper back/shoulder tension, left knee healing from surgery    Patient prognosis is Good.     Patient will continue to benefit from acupuncture treatment to address the deficits listed in the problem list box on initial evaluation, provide patient family education and to maximize pt's level of independence in the home and community environment.     Patient's spiritual, cultural and educational needs considered and pt agreeable to plan of care and goals.     Anticipated barriers to treatment: none    Plan     Recommend 1 /week for 4 sessions then re-assess.      Education:  Patient is aware of cumulative benefit of acupuncture

## 2025-08-08 ENCOUNTER — OFFICE VISIT (OUTPATIENT)
Dept: SURGERY | Facility: CLINIC | Age: 70
End: 2025-08-08
Payer: COMMERCIAL

## 2025-08-08 VITALS
DIASTOLIC BLOOD PRESSURE: 67 MMHG | HEART RATE: 70 BPM | BODY MASS INDEX: 24.57 KG/M2 | WEIGHT: 152.88 LBS | SYSTOLIC BLOOD PRESSURE: 128 MMHG | HEIGHT: 66 IN

## 2025-08-08 DIAGNOSIS — R22.2 SUBCUTANEOUS MASS OF ABDOMINAL WALL: ICD-10-CM

## 2025-08-08 PROCEDURE — 99999 PR PBB SHADOW E&M-EST. PATIENT-LVL IV: CPT | Mod: PBBFAC,,, | Performed by: SURGERY

## 2025-08-08 NOTE — PROGRESS NOTES
[facility] H&P    Subjective:     Nguyen Ivan is a 70 y.o. female with PMH of breast cancer s/p bilateral mastectomy, HTN, pre-diabetes, and hyperlipidemia, who presents to clinic for small mass in the RLQ. Noticed bump on abdominal wall while showering, can see it when laying down. No associated pain. Had an US which was unable to identify if it was a lipoma vs a lymph node. Overall this does not bother her, and unless there is a reason to remove it, she is happy to leave it alone.    Weight loss of 15 pounds unintentional. Chronic constipation which is unchanged. Up-to-date with colonoscopy. Follows with medical oncologist who does regular breast exams.      PMH:   Past Medical History:   Diagnosis Date    Breast cancer 12/2017    Birads 4  Bilateral Mastectomy     Depression     Family history of breast cancer in sister     age 45,lumpectomy,XRT    H/O abnormal mammogram 10/30/2017    Birads 4    Mitral valve prolapse        Past Surgical History:   Past Surgical History:   Procedure Laterality Date    BILATERAL MASTECTOMY Bilateral 12/2017    BREAST LUMPECTOMY  2017    atypical ductal hyperplasia     BREAST RECONSTRUCTION Bilateral 3/2018 & 5/2018    CHOLECYSTECTOMY  2005    COLONOSCOPY  2011    COLONOSCOPY N/A 06/08/2023    Procedure: COLONOSCOPY;  Surgeon: Ozzy Dela Cruz MD;  [facility]  Service: Colon and Rectal;  Laterality: N/A;  4/27 referred by Dawn Black NP/PEG/instr. to portal-st    DILATION AND CURETTAGE OF UTERUS  1983    Missed Ab    EYE SURGERY  2017    cataracts    HERNIA REPAIR  2005    LIVER LOBECTOMY  2005    REDUCTION OF BOTH BREASTS  2009    TONSILLECTOMY  08/1970    TOTAL KNEE ARTHROPLASTY Left 06/17/2025    TUBAL LIGATION  1985    with last delivery     UMBILICAL HERNIA REPAIR  2006       Social History:Social History[1]    Allergies: Review of patient's allergies indicates:  No Known Allergies    Medications:  Medications  Ordered Prior to Encounter[2]      Objective:     Vital Signs (Most Recent)  Pulse: 70 (08/08/25 0953)  BP: 128/67 (08/08/25 0953)    Review of Systems   Constitutional:  Negative for fever.   Respiratory:  Negative for cough.    Cardiovascular:  Negative for chest pain.   Gastrointestinal:  Negative for nausea and vomiting.   Genitourinary:  Negative for dysuria.   Musculoskeletal:  Negative for myalgias.   Skin:  Negative for rash.   Neurological:  Negative for headaches.   All other systems reviewed and are negative.   A 10+ review of systems was performed with pertinent positives and negatives noted above in the history of present illness.  Other systems were negative unless otherwise specified.    Physical Exam:  Gen: awake, alert, in no acute distress  HEENT: normocephalic, atraumatic, EOMI, no scleral icterus  CV: regular rate and rhythm  Pulm: equal chest rise bilaterally, normal work of breathing  Abd:  soft, non-tender, no guarding, small palpable approximately 1 cm mass in the RLQ  Ext: WWP, skin warm and dry    Imaging  The following imaging was reviewed: Abdominal US      Assessment:     Nguyen Ivan is a 70 y.o. female with a RLQ subcutaneous mass. US equivocal for lymph node vs lipoma. Does feel slightly firmer, but is able to be  from the underlying tissues. Discussed excisional biopsy vs close monitoring. Patient prefers to monitor and feels comfortable sending a MyChart message if she notices any changes in the mass.    Plan:     - Observation of mass  - RTC as necessary  - Happy to perform excisional biopsy if patient were to change her mind.    I have personally taken the history and examined this patient and agree with the resident's note as stated above.         Suraj Ospina MD             [1]   Social History  Socioeconomic History    Marital status:    Tobacco Use    Smoking status: Never    Smokeless tobacco: Never   Substance and Sexual Activity    Alcohol use:  Yes     Alcohol/week: 0.0 - 4.0 standard drinks of alcohol     Comment: Social     Drug use: Yes     Types: Marijuana     Comment: [facility] gummies/medical marijuana    Sexual activity: Yes     Partners: Male     Birth control/protection: Post-menopausal, See Surgical Hx     Comment: tubal ligation 198     Social Drivers of Health     Financial Resource Strain: Low Risk  (4/6/2025)    Overall Financial Resource Strain (CARDIA)     Difficulty of Paying Living Expenses: Not hard at all   Food Insecurity: No Food Insecurity (6/17/2025)    Received from [facility]    Hunger Vital Sign     Worried About Running Out of Food in the Last Year: Never true     Ran Out of Food in the Last Year: Never true   Transportation Needs: No Transportation Needs (6/17/2025)    Received from [facility]    PRAPARE - Transportation     In the past 12 months, has lack of transportation kept you from medical appointments or from getting medications?: No     In the past 12 months, has lack of transportation kept you from meetings, work, or from getting things needed for daily living?: No   Physical Activity: Insufficiently Active (4/6/2025)    Exercise Vital Sign     Days of Exercise per Week: 2 days     Minutes of Exercise per Session: 60 min   Stress: Stress Concern Present (4/6/2025)    [employer] [place] [employer] - Occupational Stress Questionnaire     Feeling of Stress : Rather much   Housing Stability: Low Risk  (6/17/2025)    Received from [facility]    Housing Stability Vital Sign     Unable to Pay for Housing in the Last Year: No     Number of Times Moved in the Last Year: 0     Homeless in the Last Year: No   [2]   Current Outpatient Medications on File Prior to Visit   Medication Sig Dispense Refill    acetaminophen (TYLENOL) 650 MG TbSR       ALPRAZolam (XANAX) 0.25 MG tablet TAKE 1 TABLET(0.25 MG) BY MOUTH EVERY NIGHT AS NEEDED FOR ANXIETY 30 tablet 0    aspirin (ECOTRIN) 81 MG EC tablet Take 81 mg by mouth 2 (two)  times daily.      B-COMPLEX PLUS VIT C, CALCIUM, 300 mg-150 mg calcium Tab Take 1 tablet by mouth Daily.      cholecalciferol, vitamin D3, 125 mcg (5,000 unit) capsule TAKE 1 CAPSULE BY MOUTH EVERY DAY 90 capsule 3    cyanocobalamin, vitamin B-12, 50 mcg Lozg Take 1 each by mouth.      losartan (COZAAR) 25 MG tablet TAKE 1 TABLET(25 MG) BY MOUTH EVERY DAY 90 tablet 3    metFORMIN (GLUCOPHAGE-XR) 500 MG ER 24hr tablet Take 1 tablet (500 mg total) by mouth daily with breakfast. 90 tablet 3    mupirocin (BACTROBAN) 2 % ointment Apply topically 2 (two) times daily.      traZODone (DESYREL) 100 MG tablet Take 1 tablet (100 mg total) by mouth every evening. 90 tablet 1    venlafaxine (EFFEXOR-XR) 150 MG Cp24 Take 1 capsule (150 mg total) by mouth once daily. 30 capsule 2     No current facility-administered medications on file prior to visit.

## 2025-08-12 ENCOUNTER — PATIENT MESSAGE (OUTPATIENT)
Dept: REHABILITATION | Facility: HOSPITAL | Age: 70
End: 2025-08-12
Payer: MEDICARE

## 2025-08-22 ENCOUNTER — OFFICE VISIT (OUTPATIENT)
Dept: INTERNAL MEDICINE | Facility: CLINIC | Age: 70
End: 2025-08-22
Payer: COMMERCIAL

## 2025-08-22 ENCOUNTER — LAB VISIT (OUTPATIENT)
Dept: LAB | Facility: HOSPITAL | Age: 70
End: 2025-08-22
Attending: INTERNAL MEDICINE
Payer: COMMERCIAL

## 2025-08-22 ENCOUNTER — RESULTS FOLLOW-UP (OUTPATIENT)
Dept: INTERNAL MEDICINE | Facility: CLINIC | Age: 70
End: 2025-08-22

## 2025-08-22 VITALS
BODY MASS INDEX: 24.13 KG/M2 | OXYGEN SATURATION: 97 % | HEIGHT: 66 IN | WEIGHT: 150.13 LBS | DIASTOLIC BLOOD PRESSURE: 88 MMHG | SYSTOLIC BLOOD PRESSURE: 130 MMHG | HEART RATE: 72 BPM

## 2025-08-22 DIAGNOSIS — D62 ACUTE POSTOPERATIVE ANEMIA DUE TO EXPECTED BLOOD LOSS: ICD-10-CM

## 2025-08-22 DIAGNOSIS — I10 PRIMARY HYPERTENSION: ICD-10-CM

## 2025-08-22 DIAGNOSIS — R45.89 DYSPHORIC MOOD: ICD-10-CM

## 2025-08-22 DIAGNOSIS — Z85.3 HISTORY OF BREAST CANCER: ICD-10-CM

## 2025-08-22 DIAGNOSIS — R73.03 PREDIABETES: ICD-10-CM

## 2025-08-22 DIAGNOSIS — M85.89 OSTEOPENIA OF MULTIPLE SITES: ICD-10-CM

## 2025-08-22 DIAGNOSIS — F32.9 MAJOR DEPRESSION, CHRONIC: ICD-10-CM

## 2025-08-22 DIAGNOSIS — E78.2 MIXED HYPERLIPIDEMIA: ICD-10-CM

## 2025-08-22 DIAGNOSIS — Z90.13 HISTORY OF BILATERAL MASTECTOMY: ICD-10-CM

## 2025-08-22 DIAGNOSIS — E78.49 OTHER HYPERLIPIDEMIA: ICD-10-CM

## 2025-08-22 DIAGNOSIS — N30.01 ACUTE CYSTITIS WITH HEMATURIA: ICD-10-CM

## 2025-08-22 DIAGNOSIS — R35.0 URINARY FREQUENCY: Primary | ICD-10-CM

## 2025-08-22 LAB
ABSOLUTE EOSINOPHIL (OHS): 0 K/UL
ABSOLUTE MONOCYTE (OHS): 0.85 K/UL (ref 0.3–1)
ABSOLUTE NEUTROPHIL COUNT (OHS): 9.19 K/UL (ref 1.8–7.7)
ALBUMIN SERPL BCP-MCNC: 4.5 G/DL (ref 3.5–5.2)
ALP SERPL-CCNC: 62 UNIT/L (ref 40–150)
ALT SERPL W/O P-5'-P-CCNC: 26 UNIT/L (ref 0–55)
ANION GAP (OHS): 13 MMOL/L (ref 8–16)
AST SERPL-CCNC: 24 UNIT/L (ref 0–50)
BASOPHILS # BLD AUTO: 0.07 K/UL
BASOPHILS NFR BLD AUTO: 0.5 %
BILIRUB SERPL-MCNC: 0.5 MG/DL (ref 0.1–1)
BILIRUB UR QL STRIP: NEGATIVE
BUN SERPL-MCNC: 23 MG/DL (ref 8–23)
CALCIUM SERPL-MCNC: 10 MG/DL (ref 8.7–10.5)
CHLORIDE SERPL-SCNC: 105 MMOL/L (ref 95–110)
CHOLEST SERPL-MCNC: 261 MG/DL (ref 120–199)
CHOLEST/HDLC SERPL: 3.1 {RATIO} (ref 2–5)
CO2 SERPL-SCNC: 21 MMOL/L (ref 23–29)
CREAT SERPL-MCNC: 0.7 MG/DL (ref 0.5–1.4)
EAG (OHS): 114 MG/DL (ref 68–131)
ERYTHROCYTE [DISTWIDTH] IN BLOOD BY AUTOMATED COUNT: 12.6 % (ref 11.5–14.5)
GFR SERPLBLD CREATININE-BSD FMLA CKD-EPI: >60 ML/MIN/1.73/M2
GLUCOSE SERPL-MCNC: 90 MG/DL (ref 70–110)
GLUCOSE UR QL STRIP: NEGATIVE
HBA1C MFR BLD: 5.6 % (ref 4–5.6)
HCT VFR BLD AUTO: 42.6 % (ref 37–48.5)
HDLC SERPL-MCNC: 85 MG/DL (ref 40–75)
HDLC SERPL: 32.6 % (ref 20–50)
HGB BLD-MCNC: 13.4 GM/DL (ref 12–16)
IMM GRANULOCYTES # BLD AUTO: 0.06 K/UL (ref 0–0.04)
IMM GRANULOCYTES NFR BLD AUTO: 0.5 % (ref 0–0.5)
KETONES UR QL STRIP: NEGATIVE
LDLC SERPL CALC-MCNC: 156.8 MG/DL (ref 63–159)
LEUKOCYTE ESTERASE UR QL STRIP: POSITIVE
LYMPHOCYTES # BLD AUTO: 2.74 K/UL (ref 1–4.8)
MCH RBC QN AUTO: 29.1 PG (ref 27–31)
MCHC RBC AUTO-ENTMCNC: 31.5 G/DL (ref 32–36)
MCV RBC AUTO: 92 FL (ref 82–98)
NONHDLC SERPL-MCNC: 176 MG/DL
NUCLEATED RBC (/100WBC) (OHS): 0 /100 WBC
PH, POC UA: 6
PLATELET # BLD AUTO: 248 K/UL (ref 150–450)
PMV BLD AUTO: 10.8 FL (ref 9.2–12.9)
POC BLOOD, URINE: POSITIVE
POC NITRATES, URINE: POSITIVE
POTASSIUM SERPL-SCNC: 4 MMOL/L (ref 3.5–5.1)
PROT SERPL-MCNC: 7.5 GM/DL (ref 6–8.4)
PROT UR QL STRIP: POSITIVE
RBC # BLD AUTO: 4.61 M/UL (ref 4–5.4)
RELATIVE EOSINOPHIL (OHS): 0 %
RELATIVE LYMPHOCYTE (OHS): 21.2 % (ref 18–48)
RELATIVE MONOCYTE (OHS): 6.6 % (ref 4–15)
RELATIVE NEUTROPHIL (OHS): 71.2 % (ref 38–73)
SODIUM SERPL-SCNC: 139 MMOL/L (ref 136–145)
SP GR UR STRIP: 1.03 (ref 1–1.03)
TRIGL SERPL-MCNC: 96 MG/DL (ref 30–150)
TSH SERPL-ACNC: 0.92 UIU/ML (ref 0.4–4)
UROBILINOGEN UR STRIP-ACNC: 0.2 (ref 0.1–1.1)
WBC # BLD AUTO: 12.91 K/UL (ref 3.9–12.7)

## 2025-08-22 PROCEDURE — 1160F RVW MEDS BY RX/DR IN RCRD: CPT | Mod: CPTII,S$GLB,, | Performed by: INTERNAL MEDICINE

## 2025-08-22 PROCEDURE — 1126F AMNT PAIN NOTED NONE PRSNT: CPT | Mod: CPTII,S$GLB,, | Performed by: INTERNAL MEDICINE

## 2025-08-22 PROCEDURE — 36415 COLL VENOUS BLD VENIPUNCTURE: CPT

## 2025-08-22 PROCEDURE — 3008F BODY MASS INDEX DOCD: CPT | Mod: CPTII,S$GLB,, | Performed by: INTERNAL MEDICINE

## 2025-08-22 PROCEDURE — 85025 COMPLETE CBC W/AUTO DIFF WBC: CPT

## 2025-08-22 PROCEDURE — 87086 URINE CULTURE/COLONY COUNT: CPT | Performed by: INTERNAL MEDICINE

## 2025-08-22 PROCEDURE — 1101F PT FALLS ASSESS-DOCD LE1/YR: CPT | Mod: CPTII,S$GLB,, | Performed by: INTERNAL MEDICINE

## 2025-08-22 PROCEDURE — 3075F SYST BP GE 130 - 139MM HG: CPT | Mod: CPTII,S$GLB,, | Performed by: INTERNAL MEDICINE

## 2025-08-22 PROCEDURE — 3079F DIAST BP 80-89 MM HG: CPT | Mod: CPTII,S$GLB,, | Performed by: INTERNAL MEDICINE

## 2025-08-22 PROCEDURE — 3044F HG A1C LEVEL LT 7.0%: CPT | Mod: CPTII,S$GLB,, | Performed by: INTERNAL MEDICINE

## 2025-08-22 PROCEDURE — 83036 HEMOGLOBIN GLYCOSYLATED A1C: CPT

## 2025-08-22 PROCEDURE — 84443 ASSAY THYROID STIM HORMONE: CPT

## 2025-08-22 PROCEDURE — 4010F ACE/ARB THERAPY RXD/TAKEN: CPT | Mod: CPTII,S$GLB,, | Performed by: INTERNAL MEDICINE

## 2025-08-22 PROCEDURE — 81003 URINALYSIS AUTO W/O SCOPE: CPT | Mod: QW,S$GLB,, | Performed by: INTERNAL MEDICINE

## 2025-08-22 PROCEDURE — 99999 PR PBB SHADOW E&M-EST. PATIENT-LVL IV: CPT | Mod: PBBFAC,,, | Performed by: INTERNAL MEDICINE

## 2025-08-22 PROCEDURE — 80061 LIPID PANEL: CPT

## 2025-08-22 PROCEDURE — 99214 OFFICE O/P EST MOD 30 MIN: CPT | Mod: 25,S$GLB,, | Performed by: INTERNAL MEDICINE

## 2025-08-22 PROCEDURE — 3288F FALL RISK ASSESSMENT DOCD: CPT | Mod: CPTII,S$GLB,, | Performed by: INTERNAL MEDICINE

## 2025-08-22 PROCEDURE — 80053 COMPREHEN METABOLIC PANEL: CPT

## 2025-08-22 PROCEDURE — 1159F MED LIST DOCD IN RCRD: CPT | Mod: CPTII,S$GLB,, | Performed by: INTERNAL MEDICINE

## 2025-08-22 RX ORDER — BUPROPION HYDROCHLORIDE 150 MG/1
150 TABLET ORAL DAILY
Qty: 90 TABLET | Refills: 3 | Status: SHIPPED | OUTPATIENT
Start: 2025-08-22 | End: 2026-08-22

## 2025-08-22 RX ORDER — NITROFURANTOIN 25; 75 MG/1; MG/1
100 CAPSULE ORAL 2 TIMES DAILY
Qty: 14 CAPSULE | Refills: 0 | Status: SHIPPED | OUTPATIENT
Start: 2025-08-22 | End: 2025-08-29

## 2025-08-23 LAB — BACTERIA UR CULT: NORMAL

## 2025-08-25 RX ORDER — ROSUVASTATIN CALCIUM 20 MG/1
20 TABLET, COATED ORAL NIGHTLY
Qty: 90 TABLET | Refills: 3 | Status: SHIPPED | OUTPATIENT
Start: 2025-08-25